# Patient Record
Sex: FEMALE | Race: WHITE | Employment: OTHER | URBAN - METROPOLITAN AREA
[De-identification: names, ages, dates, MRNs, and addresses within clinical notes are randomized per-mention and may not be internally consistent; named-entity substitution may affect disease eponyms.]

---
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
## 2017-10-19 ENCOUNTER — TELEPHONE (OUTPATIENT)
Dept: CARDIOLOGY CLINIC | Age: 77
End: 2017-10-19

## 2018-12-03 ENCOUNTER — APPOINTMENT (OUTPATIENT)
Dept: GENERAL RADIOLOGY | Age: 78
End: 2018-12-03
Payer: MEDICARE

## 2018-12-03 ENCOUNTER — HOSPITAL ENCOUNTER (EMERGENCY)
Age: 78
Discharge: HOME | End: 2018-12-03
Attending: EMERGENCY MEDICINE
Payer: MEDICARE

## 2018-12-03 VITALS
RESPIRATION RATE: 16 BRPM | WEIGHT: 235.89 LBS | HEART RATE: 72 BPM | SYSTOLIC BLOOD PRESSURE: 142 MMHG | OXYGEN SATURATION: 96 % | DIASTOLIC BLOOD PRESSURE: 43 MMHG | BODY MASS INDEX: 43.41 KG/M2 | HEIGHT: 62 IN | TEMPERATURE: 98.1 F

## 2018-12-03 DIAGNOSIS — R06.09 DOE (DYSPNEA ON EXERTION): ICD-10-CM

## 2018-12-03 DIAGNOSIS — N18.6 ESRD ON HEMODIALYSIS (HCC): ICD-10-CM

## 2018-12-03 DIAGNOSIS — Z99.2 ESRD ON HEMODIALYSIS (HCC): ICD-10-CM

## 2018-12-03 DIAGNOSIS — B02.9 HERPES ZOSTER WITHOUT COMPLICATION: Primary | ICD-10-CM

## 2018-12-03 DIAGNOSIS — R73.9 HYPERGLYCEMIA: ICD-10-CM

## 2018-12-03 LAB
A/G RATIO: 1.4 (ref 1.1–2.2)
ALBUMIN SERPL-MCNC: 4.2 G/DL (ref 3.4–5)
ALP BLD-CCNC: 204 U/L (ref 40–129)
ALT SERPL-CCNC: 12 U/L (ref 10–40)
ANION GAP SERPL CALCULATED.3IONS-SCNC: 13 MMOL/L (ref 3–16)
AST SERPL-CCNC: 12 U/L (ref 15–37)
BASOPHILS ABSOLUTE: 0 K/UL (ref 0–0.2)
BASOPHILS RELATIVE PERCENT: 0.5 %
BILIRUB SERPL-MCNC: 0.5 MG/DL (ref 0–1)
BUN BLDV-MCNC: 11 MG/DL (ref 7–20)
CALCIUM SERPL-MCNC: 9.5 MG/DL (ref 8.3–10.6)
CHLORIDE BLD-SCNC: 93 MMOL/L (ref 99–110)
CO2: 30 MMOL/L (ref 21–32)
CREAT SERPL-MCNC: 3 MG/DL (ref 0.6–1.2)
EOSINOPHILS ABSOLUTE: 0.3 K/UL (ref 0–0.6)
EOSINOPHILS RELATIVE PERCENT: 4.7 %
GFR AFRICAN AMERICAN: 18
GFR NON-AFRICAN AMERICAN: 15
GLOBULIN: 3 G/DL
GLUCOSE BLD-MCNC: 298 MG/DL (ref 70–99)
HCT VFR BLD CALC: 32.6 % (ref 36–48)
HEMOGLOBIN: 10.8 G/DL (ref 12–16)
LYMPHOCYTES ABSOLUTE: 0.8 K/UL (ref 1–5.1)
LYMPHOCYTES RELATIVE PERCENT: 12.4 %
MAGNESIUM: 1.8 MG/DL (ref 1.8–2.4)
MCH RBC QN AUTO: 31.6 PG (ref 26–34)
MCHC RBC AUTO-ENTMCNC: 33.2 G/DL (ref 31–36)
MCV RBC AUTO: 95.1 FL (ref 80–100)
MONOCYTES ABSOLUTE: 0.8 K/UL (ref 0–1.3)
MONOCYTES RELATIVE PERCENT: 11.7 %
NEUTROPHILS ABSOLUTE: 4.6 K/UL (ref 1.7–7.7)
NEUTROPHILS RELATIVE PERCENT: 70.7 %
PDW BLD-RTO: 13.7 % (ref 12.4–15.4)
PLATELET # BLD: 195 K/UL (ref 135–450)
PMV BLD AUTO: 8 FL (ref 5–10.5)
POTASSIUM SERPL-SCNC: 4.5 MMOL/L (ref 3.5–5.1)
PRO-BNP: ABNORMAL PG/ML (ref 0–449)
RBC # BLD: 3.43 M/UL (ref 4–5.2)
SODIUM BLD-SCNC: 136 MMOL/L (ref 136–145)
TOTAL PROTEIN: 7.2 G/DL (ref 6.4–8.2)
TROPONIN: <0.01 NG/ML
WBC # BLD: 6.5 K/UL (ref 4–11)

## 2018-12-03 PROCEDURE — 83880 ASSAY OF NATRIURETIC PEPTIDE: CPT

## 2018-12-03 PROCEDURE — 99283 EMERGENCY DEPT VISIT LOW MDM: CPT

## 2018-12-03 PROCEDURE — 71046 X-RAY EXAM CHEST 2 VIEWS: CPT

## 2018-12-03 PROCEDURE — 85025 COMPLETE CBC W/AUTO DIFF WBC: CPT

## 2018-12-03 PROCEDURE — 84484 ASSAY OF TROPONIN QUANT: CPT

## 2018-12-03 PROCEDURE — 80053 COMPREHEN METABOLIC PANEL: CPT

## 2018-12-03 PROCEDURE — 83735 ASSAY OF MAGNESIUM: CPT

## 2018-12-03 RX ORDER — HYDROCODONE BITARTRATE AND ACETAMINOPHEN 5; 325 MG/1; MG/1
1 TABLET ORAL EVERY 6 HOURS PRN
Qty: 10 TABLET | Refills: 0 | Status: ON HOLD | OUTPATIENT
Start: 2018-12-03 | End: 2018-12-11

## 2018-12-03 RX ORDER — CINACALCET 30 MG/1
30 TABLET, FILM COATED ORAL EVERY OTHER DAY
Status: ON HOLD | COMMUNITY
End: 2021-01-01 | Stop reason: HOSPADM

## 2018-12-03 RX ORDER — VALACYCLOVIR HYDROCHLORIDE 1 G/1
1000 TABLET, FILM COATED ORAL 3 TIMES DAILY
Qty: 21 TABLET | Refills: 0 | Status: SHIPPED | OUTPATIENT
Start: 2018-12-03 | End: 2018-12-03

## 2018-12-03 RX ORDER — VALACYCLOVIR HYDROCHLORIDE 500 MG/1
500 TABLET, FILM COATED ORAL DAILY
Qty: 7 TABLET | Refills: 0 | Status: ON HOLD | OUTPATIENT
Start: 2018-12-03 | End: 2018-12-11

## 2018-12-03 RX ORDER — PREDNISONE 10 MG/1
40 TABLET ORAL DAILY
Qty: 20 TABLET | Refills: 0 | Status: SHIPPED | OUTPATIENT
Start: 2018-12-03 | End: 2018-12-08

## 2018-12-03 ASSESSMENT — ENCOUNTER SYMPTOMS
EYE ITCHING: 0
EYE DISCHARGE: 0
COUGH: 0
PHOTOPHOBIA: 0
BACK PAIN: 0
NAUSEA: 0
SHORTNESS OF BREATH: 1
CONSTIPATION: 0
ABDOMINAL PAIN: 0
EYE PAIN: 0
WHEEZING: 0
STRIDOR: 0
DIARRHEA: 0
VOMITING: 0
COLOR CHANGE: 0
ABDOMINAL DISTENTION: 0
EYE REDNESS: 0

## 2018-12-03 ASSESSMENT — PAIN SCALES - GENERAL: PAINLEVEL_OUTOF10: 6

## 2018-12-03 ASSESSMENT — PAIN DESCRIPTION - LOCATION: LOCATION: HEAD

## 2018-12-03 NOTE — ED NOTES
Bed: 23  Expected date:   Expected time:   Means of arrival:   Comments:  Germain Magana RN  12/03/18 2146

## 2018-12-03 NOTE — ED PROVIDER NOTES
[address] [facility]  eMERGENCY dEPARTMENT eNCOUnter        Pt Name: Ira Hamman  MRN: [id number]  [place] 1940  Date of evaluation: 12/3/2018  Provider: Maryann Gilmore PA-C  PCP: Delroy Hagan MD    This patient was seen and evaluated by the attending physician Eriberto Lam, [facility]       Chief Complaint   Patient presents with    Rash     pain on head for last two days, now with blisters. HISTORY OF PRESENT ILLNESS   (Location/Symptom, Timing/Onset, Context/Setting, Quality, Duration, Modifying Factors, Severity)  Note limiting factors. Ira Hamman is a [de-identified] y.o. female with history of congestive heart failure, coronary artery disease, CABG, end-stage renal disease on Monday Wednesday Friday who presents to the emergency department complaining of 2 separate issues. First, she primarily comes here for a new rash on the scalp that started this morning. She has had pain over this area and some itching for the past 2 days. The rash just appeared today. She thinks that she has shingles. Although the left upper eyelid feels \"funny\", she denies any rash on the eyelid or pain in the eye. There is no facial palsy with this. Secondly, patient presents complaining of shortness of breath that has presently worsening the past few months. She has already addressed this with her nephrologist and cardiologist who believed that she requires additional time on dialysis. While she is here dressing the rash, she wanted to have some basic blood work and chest x-ray to evaluate the shortness of breath and little bit further. Nursing Notes were all reviewed and agreed with or any disagreements were addressed  in the HPI. REVIEW OF SYSTEMS    (2-9 systems for level 4, 10 or more for level 5)     Review of Systems   Constitutional: Negative for chills and fever. HENT: Negative.     Eyes: Negative for photophobia, pain, discharge, redness, itching and visual disturbance. Respiratory: Positive for shortness of breath. Negative for cough, wheezing and stridor. Cardiovascular: Negative for chest pain, palpitations and leg swelling. Gastrointestinal: Negative for abdominal distention, abdominal pain, constipation, diarrhea, nausea and vomiting. Endocrine: Negative. Genitourinary: Negative. Musculoskeletal: Negative for back pain, neck pain and neck stiffness. Skin: Positive for rash. Negative for color change, pallor and wound. Neurological: Positive for headaches. Negative for dizziness, tremors, seizures, syncope, speech difficulty, weakness, light-headedness and numbness. Psychiatric/Behavioral: Negative for confusion. All other systems reviewed and are negative. Positives and Pertinent negatives as per HPI. Except as noted abovein the ROS, all other systems were reviewed and negative. PAST MEDICAL HISTORY     Past Medical History:   Diagnosis Date    Arthritis     Blood circulation, collateral     CAD (coronary artery disease)     CHF (congestive heart failure) (HCC)     Chronic kidney disease     [facility] mellitus [facility] [address] [address]     Hemodialysis patient ([facility] [address]     Mon. Wed. Fri.     Hypercholesteremia     Hypertension     Other disorders of kidney and ureter     Pneumonia     pneumonia    Thyroid disease          SURGICAL HISTORY     Past Surgical History:   Procedure Laterality Date    ABDOMEN SURGERY      CARDIAC SURGERY      CORONARY ARTERY BYPASS GRAFT      4 grafts, approx 5 yrs ago    CYST REMOVAL      DIALYSIS FISTULA CREATION  12/18/14    EYE SURGERY      cataracts removed/leaks    OTHER SURGICAL HISTORY Left 12/18/14    LEFT RADIOCEPHALIC FISTULA    REFRACTIVE SURGERY      TONSILLECTOMY      VASCULAR SURGERY           CURRENTMEDICATIONS       Discharge Medication List as of 12/3/2018  2:47 PM      CONTINUE these medications which have NOT CHANGED    Details   cinacalcet

## 2018-12-10 ENCOUNTER — HOSPITAL ENCOUNTER (INPATIENT)
Age: 78
LOS: 7 days | Discharge: HOME HEALTH | DRG: 640 | End: 2018-12-17
Attending: EMERGENCY MEDICINE | Admitting: PEDIATRICS
Payer: MEDICARE

## 2018-12-10 ENCOUNTER — APPOINTMENT (OUTPATIENT)
Dept: GENERAL RADIOLOGY | Age: 78
DRG: 640 | End: 2018-12-10
Payer: MEDICARE

## 2018-12-10 DIAGNOSIS — E87.5 HYPERKALEMIA: ICD-10-CM

## 2018-12-10 DIAGNOSIS — R09.89 PULMONARY VASCULAR CONGESTION: ICD-10-CM

## 2018-12-10 DIAGNOSIS — Z99.2 ESRD ON DIALYSIS (HCC): Primary | ICD-10-CM

## 2018-12-10 DIAGNOSIS — N18.6 ESRD ON DIALYSIS (HCC): Primary | ICD-10-CM

## 2018-12-10 PROBLEM — E87.70 VOLUME OVERLOAD: Status: ACTIVE | Noted: 2018-12-10

## 2018-12-10 LAB
A/G RATIO: 1.3 (ref 1.1–2.2)
ALBUMIN SERPL-MCNC: 3.8 G/DL (ref 3.4–5)
ALP BLD-CCNC: 170 U/L (ref 40–129)
ALT SERPL-CCNC: 23 U/L (ref 10–40)
ANION GAP SERPL CALCULATED.3IONS-SCNC: 15 MMOL/L (ref 3–16)
AST SERPL-CCNC: 15 U/L (ref 15–37)
BASOPHILS ABSOLUTE: 0 K/UL (ref 0–0.2)
BASOPHILS RELATIVE PERCENT: 0.2 %
BILIRUB SERPL-MCNC: 0.6 MG/DL (ref 0–1)
BUN BLDV-MCNC: 67 MG/DL (ref 7–20)
CALCIUM SERPL-MCNC: 9.1 MG/DL (ref 8.3–10.6)
CHLORIDE BLD-SCNC: 97 MMOL/L (ref 99–110)
CO2: 23 MMOL/L (ref 21–32)
CREAT SERPL-MCNC: 7.1 MG/DL (ref 0.6–1.2)
EOSINOPHILS ABSOLUTE: 0 K/UL (ref 0–0.6)
EOSINOPHILS RELATIVE PERCENT: 0.2 %
GFR AFRICAN AMERICAN: 7
GFR NON-AFRICAN AMERICAN: 6
GLOBULIN: 3 G/DL
GLUCOSE BLD-MCNC: 214 MG/DL (ref 70–99)
GLUCOSE BLD-MCNC: 240 MG/DL (ref 70–99)
HCT VFR BLD CALC: 30.7 % (ref 36–48)
HEMOGLOBIN: 10.4 G/DL (ref 12–16)
INR BLD: 1.08 (ref 0.86–1.14)
LACTIC ACID: 1.5 MMOL/L (ref 0.4–2)
LYMPHOCYTES ABSOLUTE: 0.7 K/UL (ref 1–5.1)
LYMPHOCYTES RELATIVE PERCENT: 5.8 %
MCH RBC QN AUTO: 31.9 PG (ref 26–34)
MCHC RBC AUTO-ENTMCNC: 33.8 G/DL (ref 31–36)
MCV RBC AUTO: 94.6 FL (ref 80–100)
MONOCYTES ABSOLUTE: 0.9 K/UL (ref 0–1.3)
MONOCYTES RELATIVE PERCENT: 7.9 %
NEUTROPHILS ABSOLUTE: 9.8 K/UL (ref 1.7–7.7)
NEUTROPHILS RELATIVE PERCENT: 85.9 %
PDW BLD-RTO: 14.2 % (ref 12.4–15.4)
PERFORMED ON: ABNORMAL
PLATELET # BLD: 236 K/UL (ref 135–450)
PMV BLD AUTO: 7.7 FL (ref 5–10.5)
POTASSIUM REFLEX MAGNESIUM: 5.9 MMOL/L (ref 3.5–5.1)
PRO-BNP: ABNORMAL PG/ML (ref 0–449)
PROTHROMBIN TIME: 12.3 SEC (ref 9.8–13)
RBC # BLD: 3.24 M/UL (ref 4–5.2)
SODIUM BLD-SCNC: 135 MMOL/L (ref 136–145)
TOTAL PROTEIN: 6.8 G/DL (ref 6.4–8.2)
TROPONIN: 0.02 NG/ML
WBC # BLD: 11.4 K/UL (ref 4–11)

## 2018-12-10 PROCEDURE — 96374 THER/PROPH/DIAG INJ IV PUSH: CPT

## 2018-12-10 PROCEDURE — 93005 ELECTROCARDIOGRAM TRACING: CPT | Performed by: PHYSICIAN ASSISTANT

## 2018-12-10 PROCEDURE — 94640 AIRWAY INHALATION TREATMENT: CPT

## 2018-12-10 PROCEDURE — 94664 DEMO&/EVAL PT USE INHALER: CPT

## 2018-12-10 PROCEDURE — 87040 BLOOD CULTURE FOR BACTERIA: CPT

## 2018-12-10 PROCEDURE — 71045 X-RAY EXAM CHEST 1 VIEW: CPT

## 2018-12-10 PROCEDURE — 6360000002 HC RX W HCPCS: Performed by: PHYSICIAN ASSISTANT

## 2018-12-10 PROCEDURE — 84484 ASSAY OF TROPONIN QUANT: CPT

## 2018-12-10 PROCEDURE — 6370000000 HC RX 637 (ALT 250 FOR IP): Performed by: PHYSICIAN ASSISTANT

## 2018-12-10 PROCEDURE — 83880 ASSAY OF NATRIURETIC PEPTIDE: CPT

## 2018-12-10 PROCEDURE — 99285 EMERGENCY DEPT VISIT HI MDM: CPT

## 2018-12-10 PROCEDURE — 80053 COMPREHEN METABOLIC PANEL: CPT

## 2018-12-10 PROCEDURE — 94760 N-INVAS EAR/PLS OXIMETRY 1: CPT

## 2018-12-10 PROCEDURE — 2700000000 HC OXYGEN THERAPY PER DAY

## 2018-12-10 PROCEDURE — 85610 PROTHROMBIN TIME: CPT

## 2018-12-10 PROCEDURE — 85025 COMPLETE CBC W/AUTO DIFF WBC: CPT

## 2018-12-10 PROCEDURE — 2000000000 HC ICU R&B

## 2018-12-10 PROCEDURE — 5A1D70Z PERFORMANCE OF URINARY FILTRATION, INTERMITTENT, LESS THAN 6 HOURS PER DAY: ICD-10-PCS | Performed by: INTERNAL MEDICINE

## 2018-12-10 PROCEDURE — 83605 ASSAY OF LACTIC ACID: CPT

## 2018-12-10 PROCEDURE — 2580000003 HC RX 258: Performed by: PHYSICIAN ASSISTANT

## 2018-12-10 RX ORDER — ONDANSETRON 2 MG/ML
4 INJECTION INTRAMUSCULAR; INTRAVENOUS EVERY 6 HOURS PRN
Status: DISCONTINUED | OUTPATIENT
Start: 2018-12-10 | End: 2018-12-14

## 2018-12-10 RX ORDER — IPRATROPIUM BROMIDE AND ALBUTEROL SULFATE 2.5; .5 MG/3ML; MG/3ML
1 SOLUTION RESPIRATORY (INHALATION) ONCE
Status: DISCONTINUED | OUTPATIENT
Start: 2018-12-10 | End: 2018-12-10

## 2018-12-10 RX ORDER — IPRATROPIUM BROMIDE AND ALBUTEROL SULFATE 2.5; .5 MG/3ML; MG/3ML
1 SOLUTION RESPIRATORY (INHALATION) ONCE
Status: COMPLETED | OUTPATIENT
Start: 2018-12-10 | End: 2018-12-10

## 2018-12-10 RX ORDER — SODIUM CHLORIDE 0.9 % (FLUSH) 0.9 %
10 SYRINGE (ML) INJECTION EVERY 12 HOURS SCHEDULED
Status: DISCONTINUED | OUTPATIENT
Start: 2018-12-11 | End: 2018-12-17 | Stop reason: HOSPADM

## 2018-12-10 RX ORDER — DOCUSATE SODIUM 100 MG/1
100 CAPSULE, LIQUID FILLED ORAL DAILY
Status: DISCONTINUED | OUTPATIENT
Start: 2018-12-11 | End: 2018-12-17 | Stop reason: HOSPADM

## 2018-12-10 RX ORDER — DEXTROSE MONOHYDRATE 50 MG/ML
100 INJECTION, SOLUTION INTRAVENOUS PRN
Status: DISCONTINUED | OUTPATIENT
Start: 2018-12-10 | End: 2018-12-17 | Stop reason: HOSPADM

## 2018-12-10 RX ORDER — CINACALCET 30 MG/1
30 TABLET, FILM COATED ORAL DAILY
Status: DISCONTINUED | OUTPATIENT
Start: 2018-12-11 | End: 2018-12-17 | Stop reason: HOSPADM

## 2018-12-10 RX ORDER — LIDOCAINE AND PRILOCAINE 25; 25 MG/G; MG/G
CREAM TOPICAL PRN
Status: DISCONTINUED | OUTPATIENT
Start: 2018-12-10 | End: 2018-12-17 | Stop reason: HOSPADM

## 2018-12-10 RX ORDER — OMEGA-3/DHA/EPA/FISH OIL 300-1000MG
1 CAPSULE ORAL 3 TIMES DAILY
Status: DISCONTINUED | OUTPATIENT
Start: 2018-12-11 | End: 2018-12-10 | Stop reason: CLARIF

## 2018-12-10 RX ORDER — PANTOPRAZOLE SODIUM 40 MG/1
40 TABLET, DELAYED RELEASE ORAL
Status: DISCONTINUED | OUTPATIENT
Start: 2018-12-11 | End: 2018-12-17 | Stop reason: HOSPADM

## 2018-12-10 RX ORDER — SODIUM CHLORIDE 9 MG/ML
INJECTION, SOLUTION INTRAVENOUS
Status: DISPENSED
Start: 2018-12-10 | End: 2018-12-11

## 2018-12-10 RX ORDER — NICOTINE POLACRILEX 4 MG
15 LOZENGE BUCCAL PRN
Status: DISCONTINUED | OUTPATIENT
Start: 2018-12-10 | End: 2018-12-17 | Stop reason: HOSPADM

## 2018-12-10 RX ORDER — ROSUVASTATIN CALCIUM 10 MG/1
20 TABLET, COATED ORAL DAILY
Status: DISCONTINUED | OUTPATIENT
Start: 2018-12-11 | End: 2018-12-17 | Stop reason: HOSPADM

## 2018-12-10 RX ORDER — DEXTROSE MONOHYDRATE 25 G/50ML
25 INJECTION, SOLUTION INTRAVENOUS PRN
Status: DISCONTINUED | OUTPATIENT
Start: 2018-12-10 | End: 2018-12-17 | Stop reason: HOSPADM

## 2018-12-10 RX ORDER — DEXTROSE MONOHYDRATE 25 G/50ML
12.5 INJECTION, SOLUTION INTRAVENOUS PRN
Status: DISCONTINUED | OUTPATIENT
Start: 2018-12-10 | End: 2018-12-17 | Stop reason: HOSPADM

## 2018-12-10 RX ORDER — METHYLPREDNISOLONE SODIUM SUCCINATE 125 MG/2ML
125 INJECTION, POWDER, LYOPHILIZED, FOR SOLUTION INTRAMUSCULAR; INTRAVENOUS ONCE
Status: COMPLETED | OUTPATIENT
Start: 2018-12-10 | End: 2018-12-10

## 2018-12-10 RX ORDER — ALBUTEROL SULFATE 2.5 MG/3ML
5 SOLUTION RESPIRATORY (INHALATION) ONCE
Status: COMPLETED | OUTPATIENT
Start: 2018-12-10 | End: 2018-12-10

## 2018-12-10 RX ORDER — SODIUM CHLORIDE 0.9 % (FLUSH) 0.9 %
10 SYRINGE (ML) INJECTION PRN
Status: DISCONTINUED | OUTPATIENT
Start: 2018-12-10 | End: 2018-12-17 | Stop reason: HOSPADM

## 2018-12-10 RX ORDER — ASPIRIN 81 MG/1
81 TABLET ORAL DAILY
Status: DISCONTINUED | OUTPATIENT
Start: 2018-12-11 | End: 2018-12-14

## 2018-12-10 RX ORDER — LEVOTHYROXINE SODIUM 0.03 MG/1
25 TABLET ORAL DAILY
Status: DISCONTINUED | OUTPATIENT
Start: 2018-12-11 | End: 2018-12-17 | Stop reason: HOSPADM

## 2018-12-10 RX ORDER — HEPARIN SODIUM 1000 [USP'U]/ML
2000 INJECTION, SOLUTION INTRAVENOUS; SUBCUTANEOUS ONCE
Status: DISCONTINUED | OUTPATIENT
Start: 2018-12-11 | End: 2018-12-14

## 2018-12-10 RX ORDER — SEVELAMER CARBONATE 800 MG/1
800 TABLET, FILM COATED ORAL 2 TIMES DAILY WITH MEALS
Status: DISCONTINUED | OUTPATIENT
Start: 2018-12-11 | End: 2018-12-17 | Stop reason: HOSPADM

## 2018-12-10 RX ORDER — AMLODIPINE BESYLATE 5 MG/1
10 TABLET ORAL DAILY
Status: DISCONTINUED | OUTPATIENT
Start: 2018-12-11 | End: 2018-12-11

## 2018-12-10 RX ORDER — HYDROCODONE BITARTRATE AND ACETAMINOPHEN 5; 325 MG/1; MG/1
1 TABLET ORAL EVERY 6 HOURS PRN
Status: DISCONTINUED | OUTPATIENT
Start: 2018-12-10 | End: 2018-12-17 | Stop reason: HOSPADM

## 2018-12-10 RX ORDER — CARVEDILOL 6.25 MG/1
12.5 TABLET ORAL 2 TIMES DAILY WITH MEALS
Status: DISCONTINUED | OUTPATIENT
Start: 2018-12-11 | End: 2018-12-17 | Stop reason: HOSPADM

## 2018-12-10 RX ADMIN — DEXTROSE MONOHYDRATE 25 G: 25 INJECTION, SOLUTION INTRAVENOUS at 22:16

## 2018-12-10 RX ADMIN — INSULIN HUMAN 10 UNITS: 100 INJECTION, SOLUTION PARENTERAL at 22:16

## 2018-12-10 RX ADMIN — IPRATROPIUM BROMIDE AND ALBUTEROL SULFATE 1 AMPULE: .5; 3 SOLUTION RESPIRATORY (INHALATION) at 20:03

## 2018-12-10 RX ADMIN — METHYLPREDNISOLONE SODIUM SUCCINATE 125 MG: 125 INJECTION, POWDER, FOR SOLUTION INTRAMUSCULAR; INTRAVENOUS at 20:56

## 2018-12-10 RX ADMIN — ALBUTEROL SULFATE 5 MG: 2.5 SOLUTION RESPIRATORY (INHALATION) at 20:04

## 2018-12-10 ASSESSMENT — ENCOUNTER SYMPTOMS
NAUSEA: 1
CONSTIPATION: 0
VOMITING: 0
CHEST TIGHTNESS: 0
COLOR CHANGE: 0
COUGH: 1
DIARRHEA: 0
SHORTNESS OF BREATH: 1
ABDOMINAL PAIN: 0

## 2018-12-10 ASSESSMENT — PAIN SCALES - GENERAL: PAINLEVEL_OUTOF10: 0

## 2018-12-11 PROBLEM — J96.91 RESPIRATORY FAILURE WITH HYPOXIA (HCC): Status: ACTIVE | Noted: 2018-12-11

## 2018-12-11 LAB
ANION GAP SERPL CALCULATED.3IONS-SCNC: 19 MMOL/L (ref 3–16)
BUN BLDV-MCNC: 41 MG/DL (ref 7–20)
CALCIUM SERPL-MCNC: 9.4 MG/DL (ref 8.3–10.6)
CHLORIDE BLD-SCNC: 93 MMOL/L (ref 99–110)
CO2: 22 MMOL/L (ref 21–32)
CREAT SERPL-MCNC: 5 MG/DL (ref 0.6–1.2)
EKG ATRIAL RATE: 82 BPM
EKG DIAGNOSIS: NORMAL
EKG P AXIS: 40 DEGREES
EKG P-R INTERVAL: 148 MS
EKG Q-T INTERVAL: 356 MS
EKG QRS DURATION: 64 MS
EKG QTC CALCULATION (BAZETT): 415 MS
EKG R AXIS: 83 DEGREES
EKG T AXIS: 139 DEGREES
EKG VENTRICULAR RATE: 82 BPM
GFR AFRICAN AMERICAN: 10
GFR NON-AFRICAN AMERICAN: 8
GLUCOSE BLD-MCNC: 105 MG/DL (ref 70–99)
GLUCOSE BLD-MCNC: 166 MG/DL (ref 70–99)
GLUCOSE BLD-MCNC: 221 MG/DL (ref 70–99)
GLUCOSE BLD-MCNC: 234 MG/DL (ref 70–99)
GLUCOSE BLD-MCNC: 261 MG/DL (ref 70–99)
HCT VFR BLD CALC: 31.4 % (ref 36–48)
HEMOGLOBIN: 10.5 G/DL (ref 12–16)
HEPATITIS B SURFACE ANTIGEN INTERPRETATION: NORMAL
MAGNESIUM: 1.9 MG/DL (ref 1.8–2.4)
MCH RBC QN AUTO: 31.7 PG (ref 26–34)
MCHC RBC AUTO-ENTMCNC: 33.4 G/DL (ref 31–36)
MCV RBC AUTO: 94.9 FL (ref 80–100)
PDW BLD-RTO: 14.2 % (ref 12.4–15.4)
PERFORMED ON: ABNORMAL
PHOSPHORUS: 4.8 MG/DL (ref 2.5–4.9)
PLATELET # BLD: 204 K/UL (ref 135–450)
PMV BLD AUTO: 7.8 FL (ref 5–10.5)
POTASSIUM SERPL-SCNC: 5.1 MMOL/L (ref 3.5–5.1)
RBC # BLD: 3.3 M/UL (ref 4–5.2)
SODIUM BLD-SCNC: 134 MMOL/L (ref 136–145)
WBC # BLD: 9.4 K/UL (ref 4–11)

## 2018-12-11 PROCEDURE — 6360000002 HC RX W HCPCS: Performed by: INTERNAL MEDICINE

## 2018-12-11 PROCEDURE — 90937 HEMODIALYSIS REPEATED EVAL: CPT

## 2018-12-11 PROCEDURE — 85027 COMPLETE CBC AUTOMATED: CPT

## 2018-12-11 PROCEDURE — 83735 ASSAY OF MAGNESIUM: CPT

## 2018-12-11 PROCEDURE — 1200000000 HC SEMI PRIVATE

## 2018-12-11 PROCEDURE — 99223 1ST HOSP IP/OBS HIGH 75: CPT | Performed by: INTERNAL MEDICINE

## 2018-12-11 PROCEDURE — 6370000000 HC RX 637 (ALT 250 FOR IP): Performed by: HOSPITALIST

## 2018-12-11 PROCEDURE — 80048 BASIC METABOLIC PNL TOTAL CA: CPT

## 2018-12-11 PROCEDURE — 84100 ASSAY OF PHOSPHORUS: CPT

## 2018-12-11 PROCEDURE — 2580000003 HC RX 258

## 2018-12-11 PROCEDURE — 2580000003 HC RX 258: Performed by: PEDIATRICS

## 2018-12-11 PROCEDURE — 87340 HEPATITIS B SURFACE AG IA: CPT

## 2018-12-11 PROCEDURE — 6370000000 HC RX 637 (ALT 250 FOR IP): Performed by: PEDIATRICS

## 2018-12-11 PROCEDURE — P9046 ALBUMIN (HUMAN), 25%, 20 ML: HCPCS | Performed by: INTERNAL MEDICINE

## 2018-12-11 PROCEDURE — 93010 ELECTROCARDIOGRAM REPORT: CPT | Performed by: INTERNAL MEDICINE

## 2018-12-11 PROCEDURE — 94640 AIRWAY INHALATION TREATMENT: CPT

## 2018-12-11 PROCEDURE — 94760 N-INVAS EAR/PLS OXIMETRY 1: CPT

## 2018-12-11 PROCEDURE — 36415 COLL VENOUS BLD VENIPUNCTURE: CPT

## 2018-12-11 RX ORDER — IPRATROPIUM BROMIDE AND ALBUTEROL SULFATE 2.5; .5 MG/3ML; MG/3ML
1 SOLUTION RESPIRATORY (INHALATION) 2 TIMES DAILY
Status: DISCONTINUED | OUTPATIENT
Start: 2018-12-12 | End: 2018-12-12

## 2018-12-11 RX ORDER — SODIUM CHLORIDE 9 MG/ML
INJECTION, SOLUTION INTRAVENOUS
Status: COMPLETED
Start: 2018-12-11 | End: 2018-12-11

## 2018-12-11 RX ORDER — IPRATROPIUM BROMIDE AND ALBUTEROL SULFATE 2.5; .5 MG/3ML; MG/3ML
1 SOLUTION RESPIRATORY (INHALATION)
Status: DISCONTINUED | OUTPATIENT
Start: 2018-12-11 | End: 2018-12-11

## 2018-12-11 RX ORDER — ALBUMIN (HUMAN) 12.5 G/50ML
50 SOLUTION INTRAVENOUS ONCE
Status: COMPLETED | OUTPATIENT
Start: 2018-12-11 | End: 2018-12-11

## 2018-12-11 RX ORDER — INSULIN GLARGINE 100 [IU]/ML
20 INJECTION, SOLUTION SUBCUTANEOUS NIGHTLY
Status: ON HOLD | COMMUNITY
End: 2018-12-11 | Stop reason: DRUGHIGH

## 2018-12-11 RX ORDER — HEPARIN SODIUM 1000 [USP'U]/ML
3200 INJECTION, SOLUTION INTRAVENOUS; SUBCUTANEOUS PRN
Status: DISCONTINUED | OUTPATIENT
Start: 2018-12-11 | End: 2018-12-14

## 2018-12-11 RX ORDER — IPRATROPIUM BROMIDE AND ALBUTEROL SULFATE 2.5; .5 MG/3ML; MG/3ML
1 SOLUTION RESPIRATORY (INHALATION) EVERY 4 HOURS PRN
Status: DISCONTINUED | OUTPATIENT
Start: 2018-12-11 | End: 2018-12-17 | Stop reason: HOSPADM

## 2018-12-11 RX ORDER — GUAIFENESIN/DEXTROMETHORPHAN 100-10MG/5
5 SYRUP ORAL EVERY 4 HOURS PRN
Status: DISCONTINUED | OUTPATIENT
Start: 2018-12-11 | End: 2018-12-17 | Stop reason: HOSPADM

## 2018-12-11 RX ADMIN — SODIUM CHLORIDE 1000 ML: 9 INJECTION, SOLUTION INTRAVENOUS at 09:19

## 2018-12-11 RX ADMIN — VITAMIN D, TAB 1000IU (100/BT) 1000 UNITS: 25 TAB at 09:16

## 2018-12-11 RX ADMIN — IPRATROPIUM BROMIDE AND ALBUTEROL SULFATE 1 AMPULE: .5; 3 SOLUTION RESPIRATORY (INHALATION) at 20:32

## 2018-12-11 RX ADMIN — GUAIFENESIN AND DEXTROMETHORPHAN 5 ML: 100; 10 SYRUP ORAL at 18:10

## 2018-12-11 RX ADMIN — CARVEDILOL 12.5 MG: 6.25 TABLET, FILM COATED ORAL at 18:10

## 2018-12-11 RX ADMIN — ALBUMIN (HUMAN) 50 G: 0.25 INJECTION, SOLUTION INTRAVENOUS at 10:11

## 2018-12-11 RX ADMIN — SEVELAMER CARBONATE 800 MG: 800 TABLET, FILM COATED ORAL at 18:11

## 2018-12-11 RX ADMIN — GUAIFENESIN AND DEXTROMETHORPHAN 5 ML: 100; 10 SYRUP ORAL at 22:17

## 2018-12-11 RX ADMIN — INSULIN LISPRO 20 UNITS: 100 INJECTION, SOLUTION INTRAVENOUS; SUBCUTANEOUS at 18:30

## 2018-12-11 RX ADMIN — DOCUSATE SODIUM 100 MG: 100 CAPSULE, LIQUID FILLED ORAL at 09:16

## 2018-12-11 RX ADMIN — Medication 10 ML: at 09:17

## 2018-12-11 RX ADMIN — INSULIN LISPRO 22 UNITS: 100 INJECTION, SOLUTION INTRAVENOUS; SUBCUTANEOUS at 09:21

## 2018-12-11 RX ADMIN — PANTOPRAZOLE SODIUM 40 MG: 40 TABLET, DELAYED RELEASE ORAL at 06:36

## 2018-12-11 RX ADMIN — LEVOTHYROXINE SODIUM 25 MCG: 25 TABLET ORAL at 06:36

## 2018-12-11 RX ADMIN — SEVELAMER CARBONATE 800 MG: 800 TABLET, FILM COATED ORAL at 09:16

## 2018-12-11 RX ADMIN — CINACALCET HYDROCHLORIDE 30 MG: 30 TABLET, COATED ORAL at 09:16

## 2018-12-11 RX ADMIN — HEPARIN SODIUM 3200 UNITS: 1000 INJECTION INTRAVENOUS; SUBCUTANEOUS at 09:51

## 2018-12-11 RX ADMIN — Medication 10 ML: at 21:05

## 2018-12-11 RX ADMIN — ROSUVASTATIN CALCIUM 20 MG: 10 TABLET, FILM COATED ORAL at 09:16

## 2018-12-11 ASSESSMENT — PAIN SCALES - GENERAL
PAINLEVEL_OUTOF10: 0

## 2018-12-12 ENCOUNTER — APPOINTMENT (OUTPATIENT)
Dept: GENERAL RADIOLOGY | Age: 78
DRG: 640 | End: 2018-12-12
Payer: MEDICARE

## 2018-12-12 LAB
ALBUMIN SERPL-MCNC: 4.2 G/DL (ref 3.4–5)
ANION GAP SERPL CALCULATED.3IONS-SCNC: 15 MMOL/L (ref 3–16)
ATYPICAL LYMPHOCYTE RELATIVE PERCENT: 1 % (ref 0–6)
BANDED NEUTROPHILS RELATIVE PERCENT: 2 % (ref 0–7)
BASOPHILS ABSOLUTE: 0 K/UL (ref 0–0.2)
BASOPHILS RELATIVE PERCENT: 0 %
BUN BLDV-MCNC: 37 MG/DL (ref 7–20)
CALCIUM SERPL-MCNC: 8.9 MG/DL (ref 8.3–10.6)
CHLORIDE BLD-SCNC: 93 MMOL/L (ref 99–110)
CO2: 26 MMOL/L (ref 21–32)
CREAT SERPL-MCNC: 3.9 MG/DL (ref 0.6–1.2)
EOSINOPHILS ABSOLUTE: 0 K/UL (ref 0–0.6)
EOSINOPHILS RELATIVE PERCENT: 0 %
GFR AFRICAN AMERICAN: 13
GFR NON-AFRICAN AMERICAN: 11
GLUCOSE BLD-MCNC: 113 MG/DL (ref 70–99)
GLUCOSE BLD-MCNC: 116 MG/DL (ref 70–99)
GLUCOSE BLD-MCNC: 117 MG/DL (ref 70–99)
GLUCOSE BLD-MCNC: 184 MG/DL (ref 70–99)
GLUCOSE BLD-MCNC: 318 MG/DL (ref 70–99)
HCT VFR BLD CALC: 28.7 % (ref 36–48)
HEMATOLOGY PATH CONSULT: NORMAL
HEMOGLOBIN: 9.7 G/DL (ref 12–16)
LYMPHOCYTES ABSOLUTE: 0.6 K/UL (ref 1–5.1)
LYMPHOCYTES RELATIVE PERCENT: 5 %
MAGNESIUM: 2.1 MG/DL (ref 1.8–2.4)
MCH RBC QN AUTO: 32.3 PG (ref 26–34)
MCHC RBC AUTO-ENTMCNC: 33.7 G/DL (ref 31–36)
MCV RBC AUTO: 96 FL (ref 80–100)
MONOCYTES ABSOLUTE: 1.9 K/UL (ref 0–1.3)
MONOCYTES RELATIVE PERCENT: 18 %
NEUTROPHILS ABSOLUTE: 7.8 K/UL (ref 1.7–7.7)
NEUTROPHILS RELATIVE PERCENT: 74 %
PDW BLD-RTO: 14.4 % (ref 12.4–15.4)
PERFORMED ON: ABNORMAL
PHOSPHORUS: 4.1 MG/DL (ref 2.5–4.9)
PHOSPHORUS: 4.1 MG/DL (ref 2.5–4.9)
PLATELET # BLD: 188 K/UL (ref 135–450)
PLATELET SLIDE REVIEW: ADEQUATE
PMV BLD AUTO: 8.2 FL (ref 5–10.5)
POTASSIUM SERPL-SCNC: 4.4 MMOL/L (ref 3.5–5.1)
RBC # BLD: 2.99 M/UL (ref 4–5.2)
RBC # BLD: NORMAL 10*6/UL
SLIDE REVIEW: ABNORMAL
SODIUM BLD-SCNC: 134 MMOL/L (ref 136–145)
WBC # BLD: 10.3 K/UL (ref 4–11)

## 2018-12-12 PROCEDURE — 94760 N-INVAS EAR/PLS OXIMETRY 1: CPT

## 2018-12-12 PROCEDURE — 2580000003 HC RX 258

## 2018-12-12 PROCEDURE — 94640 AIRWAY INHALATION TREATMENT: CPT

## 2018-12-12 PROCEDURE — 1200000000 HC SEMI PRIVATE

## 2018-12-12 PROCEDURE — 2580000003 HC RX 258: Performed by: PEDIATRICS

## 2018-12-12 PROCEDURE — 36415 COLL VENOUS BLD VENIPUNCTURE: CPT

## 2018-12-12 PROCEDURE — 84100 ASSAY OF PHOSPHORUS: CPT

## 2018-12-12 PROCEDURE — 6370000000 HC RX 637 (ALT 250 FOR IP): Performed by: INTERNAL MEDICINE

## 2018-12-12 PROCEDURE — 2700000000 HC OXYGEN THERAPY PER DAY

## 2018-12-12 PROCEDURE — 6360000002 HC RX W HCPCS: Performed by: INTERNAL MEDICINE

## 2018-12-12 PROCEDURE — P9046 ALBUMIN (HUMAN), 25%, 20 ML: HCPCS | Performed by: INTERNAL MEDICINE

## 2018-12-12 PROCEDURE — 85025 COMPLETE CBC W/AUTO DIFF WBC: CPT

## 2018-12-12 PROCEDURE — 6370000000 HC RX 637 (ALT 250 FOR IP): Performed by: HOSPITALIST

## 2018-12-12 PROCEDURE — 80069 RENAL FUNCTION PANEL: CPT

## 2018-12-12 PROCEDURE — 71045 X-RAY EXAM CHEST 1 VIEW: CPT

## 2018-12-12 PROCEDURE — 83735 ASSAY OF MAGNESIUM: CPT

## 2018-12-12 PROCEDURE — 90937 HEMODIALYSIS REPEATED EVAL: CPT

## 2018-12-12 PROCEDURE — 6370000000 HC RX 637 (ALT 250 FOR IP): Performed by: PEDIATRICS

## 2018-12-12 RX ORDER — IPRATROPIUM BROMIDE AND ALBUTEROL SULFATE 2.5; .5 MG/3ML; MG/3ML
1 SOLUTION RESPIRATORY (INHALATION) EVERY 4 HOURS
Status: DISCONTINUED | OUTPATIENT
Start: 2018-12-13 | End: 2018-12-15

## 2018-12-12 RX ORDER — SODIUM CHLORIDE 9 MG/ML
INJECTION, SOLUTION INTRAVENOUS
Status: COMPLETED
Start: 2018-12-12 | End: 2018-12-12

## 2018-12-12 RX ORDER — IPRATROPIUM BROMIDE AND ALBUTEROL SULFATE 2.5; .5 MG/3ML; MG/3ML
1 SOLUTION RESPIRATORY (INHALATION)
Status: DISCONTINUED | OUTPATIENT
Start: 2018-12-12 | End: 2018-12-12

## 2018-12-12 RX ORDER — GUAIFENESIN/DEXTROMETHORPHAN 100-10MG/5
5 SYRUP ORAL EVERY 4 HOURS PRN
Status: DISCONTINUED | OUTPATIENT
Start: 2018-12-12 | End: 2018-12-17 | Stop reason: HOSPADM

## 2018-12-12 RX ORDER — ALBUMIN (HUMAN) 12.5 G/50ML
50 SOLUTION INTRAVENOUS ONCE
Status: COMPLETED | OUTPATIENT
Start: 2018-12-12 | End: 2018-12-12

## 2018-12-12 RX ADMIN — CINACALCET HYDROCHLORIDE 30 MG: 30 TABLET, COATED ORAL at 08:31

## 2018-12-12 RX ADMIN — IPRATROPIUM BROMIDE AND ALBUTEROL SULFATE 1 AMPULE: .5; 3 SOLUTION RESPIRATORY (INHALATION) at 21:00

## 2018-12-12 RX ADMIN — VITAMIN D, TAB 1000IU (100/BT) 1000 UNITS: 25 TAB at 08:32

## 2018-12-12 RX ADMIN — DARBEPOETIN ALFA 40 MCG: 40 SOLUTION INTRAVENOUS; SUBCUTANEOUS at 15:17

## 2018-12-12 RX ADMIN — GUAIFENESIN AND DEXTROMETHORPHAN 5 ML: 100; 10 SYRUP ORAL at 08:35

## 2018-12-12 RX ADMIN — GUAIFENESIN AND DEXTROMETHORPHAN 5 ML: 100; 10 SYRUP ORAL at 21:37

## 2018-12-12 RX ADMIN — IPRATROPIUM BROMIDE AND ALBUTEROL SULFATE 1 AMPULE: .5; 3 SOLUTION RESPIRATORY (INHALATION) at 18:22

## 2018-12-12 RX ADMIN — ALBUMIN (HUMAN) 50 G: 0.25 INJECTION, SOLUTION INTRAVENOUS at 09:55

## 2018-12-12 RX ADMIN — Medication 10 ML: at 08:32

## 2018-12-12 RX ADMIN — SEVELAMER CARBONATE 800 MG: 800 TABLET, FILM COATED ORAL at 08:32

## 2018-12-12 RX ADMIN — IPRATROPIUM BROMIDE AND ALBUTEROL SULFATE 1 AMPULE: .5; 3 SOLUTION RESPIRATORY (INHALATION) at 16:42

## 2018-12-12 RX ADMIN — GUAIFENESIN AND DEXTROMETHORPHAN 5 ML: 100; 10 SYRUP ORAL at 15:16

## 2018-12-12 RX ADMIN — IPRATROPIUM BROMIDE AND ALBUTEROL SULFATE 1 AMPULE: .5; 3 SOLUTION RESPIRATORY (INHALATION) at 00:31

## 2018-12-12 RX ADMIN — SEVELAMER CARBONATE 800 MG: 800 TABLET, FILM COATED ORAL at 17:20

## 2018-12-12 RX ADMIN — IPRATROPIUM BROMIDE AND ALBUTEROL SULFATE 1 AMPULE: .5; 3 SOLUTION RESPIRATORY (INHALATION) at 23:49

## 2018-12-12 RX ADMIN — SODIUM CHLORIDE 2000 ML: 9 INJECTION, SOLUTION INTRAVENOUS at 09:58

## 2018-12-12 RX ADMIN — LEVOTHYROXINE SODIUM 25 MCG: 25 TABLET ORAL at 06:47

## 2018-12-12 RX ADMIN — Medication 10 ML: at 21:37

## 2018-12-12 RX ADMIN — PANTOPRAZOLE SODIUM 40 MG: 40 TABLET, DELAYED RELEASE ORAL at 06:47

## 2018-12-12 RX ADMIN — ROSUVASTATIN CALCIUM 20 MG: 10 TABLET, FILM COATED ORAL at 08:32

## 2018-12-12 RX ADMIN — DOCUSATE SODIUM 100 MG: 100 CAPSULE, LIQUID FILLED ORAL at 08:31

## 2018-12-12 RX ADMIN — IPRATROPIUM BROMIDE AND ALBUTEROL SULFATE 1 AMPULE: .5; 3 SOLUTION RESPIRATORY (INHALATION) at 08:41

## 2018-12-12 RX ADMIN — ASPIRIN 81 MG: 81 TABLET, COATED ORAL at 08:32

## 2018-12-12 ASSESSMENT — PAIN SCALES - GENERAL
PAINLEVEL_OUTOF10: 0

## 2018-12-13 ENCOUNTER — APPOINTMENT (OUTPATIENT)
Dept: CT IMAGING | Age: 78
DRG: 640 | End: 2018-12-13
Payer: MEDICARE

## 2018-12-13 ENCOUNTER — APPOINTMENT (OUTPATIENT)
Dept: GENERAL RADIOLOGY | Age: 78
DRG: 640 | End: 2018-12-13
Payer: MEDICARE

## 2018-12-13 LAB
ANION GAP SERPL CALCULATED.3IONS-SCNC: 9 MMOL/L (ref 3–16)
BASOPHILS ABSOLUTE: 0 K/UL (ref 0–0.2)
BASOPHILS RELATIVE PERCENT: 0.2 %
BUN BLDV-MCNC: 32 MG/DL (ref 7–20)
CALCIUM SERPL-MCNC: 8.7 MG/DL (ref 8.3–10.6)
CHLORIDE BLD-SCNC: 98 MMOL/L (ref 99–110)
CO2: 27 MMOL/L (ref 21–32)
CREAT SERPL-MCNC: 3.2 MG/DL (ref 0.6–1.2)
EOSINOPHILS ABSOLUTE: 0.2 K/UL (ref 0–0.6)
EOSINOPHILS RELATIVE PERCENT: 2.2 %
GFR AFRICAN AMERICAN: 17
GFR NON-AFRICAN AMERICAN: 14
GLUCOSE BLD-MCNC: 208 MG/DL (ref 70–99)
GLUCOSE BLD-MCNC: 217 MG/DL (ref 70–99)
GLUCOSE BLD-MCNC: 223 MG/DL (ref 70–99)
GLUCOSE BLD-MCNC: 258 MG/DL (ref 70–99)
HCT VFR BLD CALC: 28.9 % (ref 36–48)
HEMOGLOBIN: 9.6 G/DL (ref 12–16)
LYMPHOCYTES ABSOLUTE: 1.2 K/UL (ref 1–5.1)
LYMPHOCYTES RELATIVE PERCENT: 12.3 %
MAGNESIUM: 2 MG/DL (ref 1.8–2.4)
MCH RBC QN AUTO: 32.4 PG (ref 26–34)
MCHC RBC AUTO-ENTMCNC: 33.4 G/DL (ref 31–36)
MCV RBC AUTO: 97.1 FL (ref 80–100)
MONOCYTES ABSOLUTE: 1.5 K/UL (ref 0–1.3)
MONOCYTES RELATIVE PERCENT: 16 %
NEUTROPHILS ABSOLUTE: 6.6 K/UL (ref 1.7–7.7)
NEUTROPHILS RELATIVE PERCENT: 69.3 %
PDW BLD-RTO: 14.4 % (ref 12.4–15.4)
PERFORMED ON: ABNORMAL
PHOSPHORUS: 3.4 MG/DL (ref 2.5–4.9)
PLATELET # BLD: 143 K/UL (ref 135–450)
PMV BLD AUTO: 8.1 FL (ref 5–10.5)
POTASSIUM SERPL-SCNC: 4.4 MMOL/L (ref 3.5–5.1)
PRO-BNP: ABNORMAL PG/ML (ref 0–449)
RBC # BLD: 2.97 M/UL (ref 4–5.2)
SODIUM BLD-SCNC: 134 MMOL/L (ref 136–145)
TROPONIN: 0.02 NG/ML
TSH SERPL DL<=0.05 MIU/L-ACNC: 3.19 UIU/ML (ref 0.27–4.2)
WBC # BLD: 9.5 K/UL (ref 4–11)

## 2018-12-13 PROCEDURE — 71260 CT THORAX DX C+: CPT

## 2018-12-13 PROCEDURE — 99223 1ST HOSP IP/OBS HIGH 75: CPT | Performed by: INTERNAL MEDICINE

## 2018-12-13 PROCEDURE — 6360000004 HC RX CONTRAST MEDICATION: Performed by: INTERNAL MEDICINE

## 2018-12-13 PROCEDURE — 2580000003 HC RX 258: Performed by: INTERNAL MEDICINE

## 2018-12-13 PROCEDURE — 6370000000 HC RX 637 (ALT 250 FOR IP): Performed by: INTERNAL MEDICINE

## 2018-12-13 PROCEDURE — 2580000003 HC RX 258

## 2018-12-13 PROCEDURE — 1200000000 HC SEMI PRIVATE

## 2018-12-13 PROCEDURE — 94762 N-INVAS EAR/PLS OXIMTRY CONT: CPT

## 2018-12-13 PROCEDURE — 93005 ELECTROCARDIOGRAM TRACING: CPT | Performed by: INTERNAL MEDICINE

## 2018-12-13 PROCEDURE — 2000000000 HC ICU R&B

## 2018-12-13 PROCEDURE — 6360000002 HC RX W HCPCS: Performed by: INTERNAL MEDICINE

## 2018-12-13 PROCEDURE — 94640 AIRWAY INHALATION TREATMENT: CPT

## 2018-12-13 PROCEDURE — 84100 ASSAY OF PHOSPHORUS: CPT

## 2018-12-13 PROCEDURE — 6370000000 HC RX 637 (ALT 250 FOR IP): Performed by: PEDIATRICS

## 2018-12-13 PROCEDURE — 71045 X-RAY EXAM CHEST 1 VIEW: CPT

## 2018-12-13 PROCEDURE — 90937 HEMODIALYSIS REPEATED EVAL: CPT

## 2018-12-13 PROCEDURE — 80048 BASIC METABOLIC PNL TOTAL CA: CPT

## 2018-12-13 PROCEDURE — 94760 N-INVAS EAR/PLS OXIMETRY 1: CPT

## 2018-12-13 PROCEDURE — 2500000003 HC RX 250 WO HCPCS: Performed by: INTERNAL MEDICINE

## 2018-12-13 PROCEDURE — 83735 ASSAY OF MAGNESIUM: CPT

## 2018-12-13 PROCEDURE — 83880 ASSAY OF NATRIURETIC PEPTIDE: CPT

## 2018-12-13 PROCEDURE — 36415 COLL VENOUS BLD VENIPUNCTURE: CPT

## 2018-12-13 PROCEDURE — 84443 ASSAY THYROID STIM HORMONE: CPT

## 2018-12-13 PROCEDURE — 99291 CRITICAL CARE FIRST HOUR: CPT | Performed by: INTERNAL MEDICINE

## 2018-12-13 PROCEDURE — 85025 COMPLETE CBC W/AUTO DIFF WBC: CPT

## 2018-12-13 PROCEDURE — 84484 ASSAY OF TROPONIN QUANT: CPT

## 2018-12-13 PROCEDURE — 2700000000 HC OXYGEN THERAPY PER DAY

## 2018-12-13 PROCEDURE — 2580000003 HC RX 258: Performed by: PEDIATRICS

## 2018-12-13 RX ORDER — METOPROLOL TARTRATE 5 MG/5ML
5 INJECTION INTRAVENOUS EVERY 6 HOURS
Status: DISCONTINUED | OUTPATIENT
Start: 2018-12-13 | End: 2018-12-17 | Stop reason: HOSPADM

## 2018-12-13 RX ORDER — METHYLPREDNISOLONE SODIUM SUCCINATE 40 MG/ML
40 INJECTION, POWDER, LYOPHILIZED, FOR SOLUTION INTRAMUSCULAR; INTRAVENOUS EVERY 6 HOURS
Status: DISCONTINUED | OUTPATIENT
Start: 2018-12-13 | End: 2018-12-14

## 2018-12-13 RX ORDER — IPRATROPIUM BROMIDE AND ALBUTEROL SULFATE 2.5; .5 MG/3ML; MG/3ML
1 SOLUTION RESPIRATORY (INHALATION)
Status: DISCONTINUED | OUTPATIENT
Start: 2018-12-13 | End: 2018-12-13

## 2018-12-13 RX ORDER — 0.9 % SODIUM CHLORIDE 0.9 %
250 INTRAVENOUS SOLUTION INTRAVENOUS ONCE
Status: COMPLETED | OUTPATIENT
Start: 2018-12-13 | End: 2018-12-13

## 2018-12-13 RX ORDER — METOPROLOL TARTRATE 5 MG/5ML
INJECTION INTRAVENOUS
Status: DISPENSED
Start: 2018-12-13 | End: 2018-12-14

## 2018-12-13 RX ORDER — SODIUM CHLORIDE 9 MG/ML
INJECTION, SOLUTION INTRAVENOUS
Status: COMPLETED
Start: 2018-12-13 | End: 2018-12-13

## 2018-12-13 RX ORDER — DILTIAZEM HYDROCHLORIDE 5 MG/ML
10 INJECTION INTRAVENOUS ONCE
Status: DISCONTINUED | OUTPATIENT
Start: 2018-12-13 | End: 2018-12-14

## 2018-12-13 RX ORDER — HEPARIN SODIUM 5000 [USP'U]/ML
5000 INJECTION, SOLUTION INTRAVENOUS; SUBCUTANEOUS EVERY 8 HOURS SCHEDULED
Status: DISCONTINUED | OUTPATIENT
Start: 2018-12-13 | End: 2018-12-14

## 2018-12-13 RX ORDER — BENZONATATE 100 MG/1
100 CAPSULE ORAL 3 TIMES DAILY PRN
Status: DISCONTINUED | OUTPATIENT
Start: 2018-12-13 | End: 2018-12-17 | Stop reason: HOSPADM

## 2018-12-13 RX ADMIN — METHYLPREDNISOLONE SODIUM SUCCINATE 40 MG: 40 INJECTION, POWDER, FOR SOLUTION INTRAMUSCULAR; INTRAVENOUS at 16:42

## 2018-12-13 RX ADMIN — LEVOTHYROXINE SODIUM 25 MCG: 25 TABLET ORAL at 06:00

## 2018-12-13 RX ADMIN — HEPARIN SODIUM 3200 UNITS: 1000 INJECTION INTRAVENOUS; SUBCUTANEOUS at 09:39

## 2018-12-13 RX ADMIN — SEVELAMER CARBONATE 800 MG: 800 TABLET, FILM COATED ORAL at 08:26

## 2018-12-13 RX ADMIN — IPRATROPIUM BROMIDE AND ALBUTEROL SULFATE 1 AMPULE: .5; 3 SOLUTION RESPIRATORY (INHALATION) at 02:55

## 2018-12-13 RX ADMIN — IPRATROPIUM BROMIDE AND ALBUTEROL SULFATE 1 AMPULE: .5; 3 SOLUTION RESPIRATORY (INHALATION) at 15:25

## 2018-12-13 RX ADMIN — IOPAMIDOL 75 ML: 755 INJECTION, SOLUTION INTRAVENOUS at 22:25

## 2018-12-13 RX ADMIN — METHYLPREDNISOLONE SODIUM SUCCINATE 40 MG: 40 INJECTION, POWDER, FOR SOLUTION INTRAMUSCULAR; INTRAVENOUS at 21:31

## 2018-12-13 RX ADMIN — INSULIN LISPRO 22 UNITS: 100 INJECTION, SOLUTION INTRAVENOUS; SUBCUTANEOUS at 13:33

## 2018-12-13 RX ADMIN — GUAIFENESIN AND DEXTROMETHORPHAN 5 ML: 100; 10 SYRUP ORAL at 21:31

## 2018-12-13 RX ADMIN — ROSUVASTATIN CALCIUM 20 MG: 10 TABLET, FILM COATED ORAL at 13:32

## 2018-12-13 RX ADMIN — Medication 250 ML: at 17:35

## 2018-12-13 RX ADMIN — HEPARIN SODIUM 5000 UNITS: 5000 INJECTION INTRAVENOUS; SUBCUTANEOUS at 16:42

## 2018-12-13 RX ADMIN — IPRATROPIUM BROMIDE AND ALBUTEROL SULFATE 1 AMPULE: .5; 3 SOLUTION RESPIRATORY (INHALATION) at 23:42

## 2018-12-13 RX ADMIN — CARVEDILOL 12.5 MG: 6.25 TABLET, FILM COATED ORAL at 13:33

## 2018-12-13 RX ADMIN — IPRATROPIUM BROMIDE AND ALBUTEROL SULFATE 1 AMPULE: .5; 3 SOLUTION RESPIRATORY (INHALATION) at 19:42

## 2018-12-13 RX ADMIN — GUAIFENESIN AND DEXTROMETHORPHAN 5 ML: 100; 10 SYRUP ORAL at 08:28

## 2018-12-13 RX ADMIN — DEXTROSE MONOHYDRATE 150 MG: 50 INJECTION, SOLUTION INTRAVENOUS at 17:50

## 2018-12-13 RX ADMIN — SODIUM CHLORIDE 250 ML: 9 INJECTION, SOLUTION INTRAVENOUS at 17:35

## 2018-12-13 RX ADMIN — IPRATROPIUM BROMIDE AND ALBUTEROL SULFATE 1 AMPULE: .5; 3 SOLUTION RESPIRATORY (INHALATION) at 07:24

## 2018-12-13 RX ADMIN — CINACALCET HYDROCHLORIDE 30 MG: 30 TABLET, COATED ORAL at 08:24

## 2018-12-13 RX ADMIN — HEPARIN SODIUM 5000 UNITS: 5000 INJECTION INTRAVENOUS; SUBCUTANEOUS at 21:31

## 2018-12-13 RX ADMIN — METOPROLOL TARTRATE 5 MG: 5 INJECTION, SOLUTION INTRAVENOUS at 16:15

## 2018-12-13 RX ADMIN — BENZONATATE 100 MG: 100 CAPSULE ORAL at 02:45

## 2018-12-13 RX ADMIN — BENZONATATE 100 MG: 100 CAPSULE ORAL at 21:31

## 2018-12-13 RX ADMIN — AMIODARONE HYDROCHLORIDE 1 MG/MIN: 50 INJECTION, SOLUTION INTRAVENOUS at 18:06

## 2018-12-13 RX ADMIN — ASPIRIN 81 MG: 81 TABLET, COATED ORAL at 21:31

## 2018-12-13 RX ADMIN — DOCUSATE SODIUM 100 MG: 100 CAPSULE, LIQUID FILLED ORAL at 08:25

## 2018-12-13 RX ADMIN — Medication 10 ML: at 21:41

## 2018-12-13 RX ADMIN — PANTOPRAZOLE SODIUM 40 MG: 40 TABLET, DELAYED RELEASE ORAL at 06:00

## 2018-12-13 RX ADMIN — VITAMIN D, TAB 1000IU (100/BT) 1000 UNITS: 25 TAB at 08:25

## 2018-12-13 ASSESSMENT — ENCOUNTER SYMPTOMS
EYE REDNESS: 0
CHOKING: 0
RHINORRHEA: 0
DIARRHEA: 0
ABDOMINAL PAIN: 0
TROUBLE SWALLOWING: 0
APNEA: 0
FACIAL SWELLING: 0
BLOOD IN STOOL: 0
COLOR CHANGE: 0
CHEST TIGHTNESS: 0
STRIDOR: 0
SHORTNESS OF BREATH: 1
NAUSEA: 0
COUGH: 0
PHOTOPHOBIA: 0
EYE DISCHARGE: 0

## 2018-12-13 ASSESSMENT — PAIN SCALES - GENERAL
PAINLEVEL_OUTOF10: 0

## 2018-12-14 LAB
ANION GAP SERPL CALCULATED.3IONS-SCNC: 16 MMOL/L (ref 3–16)
BASOPHILS ABSOLUTE: 0 K/UL (ref 0–0.2)
BASOPHILS RELATIVE PERCENT: 0 %
BUN BLDV-MCNC: 59 MG/DL (ref 7–20)
CALCIUM SERPL-MCNC: 8.7 MG/DL (ref 8.3–10.6)
CHLORIDE BLD-SCNC: 95 MMOL/L (ref 99–110)
CO2: 20 MMOL/L (ref 21–32)
CREAT SERPL-MCNC: 5 MG/DL (ref 0.6–1.2)
EKG ATRIAL RATE: 117 BPM
EKG DIAGNOSIS: NORMAL
EKG Q-T INTERVAL: 324 MS
EKG QRS DURATION: 78 MS
EKG QTC CALCULATION (BAZETT): 474 MS
EKG R AXIS: 68 DEGREES
EKG T AXIS: 214 DEGREES
EKG VENTRICULAR RATE: 129 BPM
EOSINOPHILS ABSOLUTE: 0 K/UL (ref 0–0.6)
EOSINOPHILS RELATIVE PERCENT: 0 %
GFR AFRICAN AMERICAN: 10
GFR NON-AFRICAN AMERICAN: 8
GLUCOSE BLD-MCNC: 209 MG/DL (ref 70–99)
GLUCOSE BLD-MCNC: 378 MG/DL (ref 70–99)
GLUCOSE BLD-MCNC: 407 MG/DL (ref 70–99)
HCT VFR BLD CALC: 32.4 % (ref 36–48)
HEMOGLOBIN: 10.7 G/DL (ref 12–16)
LYMPHOCYTES ABSOLUTE: 0.5 K/UL (ref 1–5.1)
LYMPHOCYTES RELATIVE PERCENT: 6.4 %
MAGNESIUM: 2.1 MG/DL (ref 1.8–2.4)
MCH RBC QN AUTO: 32.2 PG (ref 26–34)
MCHC RBC AUTO-ENTMCNC: 33.1 G/DL (ref 31–36)
MCV RBC AUTO: 97.2 FL (ref 80–100)
MONOCYTES ABSOLUTE: 0.1 K/UL (ref 0–1.3)
MONOCYTES RELATIVE PERCENT: 1.1 %
NEUTROPHILS ABSOLUTE: 7.5 K/UL (ref 1.7–7.7)
NEUTROPHILS RELATIVE PERCENT: 92.5 %
PDW BLD-RTO: 14.7 % (ref 12.4–15.4)
PERFORMED ON: ABNORMAL
PERFORMED ON: ABNORMAL
PHOSPHORUS: 4.7 MG/DL (ref 2.5–4.9)
PLATELET # BLD: 141 K/UL (ref 135–450)
PMV BLD AUTO: 8.7 FL (ref 5–10.5)
POTASSIUM SERPL-SCNC: 5.4 MMOL/L (ref 3.5–5.1)
RBC # BLD: 3.34 M/UL (ref 4–5.2)
SODIUM BLD-SCNC: 131 MMOL/L (ref 136–145)
WBC # BLD: 8.1 K/UL (ref 4–11)

## 2018-12-14 PROCEDURE — 6370000000 HC RX 637 (ALT 250 FOR IP): Performed by: INTERNAL MEDICINE

## 2018-12-14 PROCEDURE — 6360000002 HC RX W HCPCS: Performed by: INTERNAL MEDICINE

## 2018-12-14 PROCEDURE — 94762 N-INVAS EAR/PLS OXIMTRY CONT: CPT

## 2018-12-14 PROCEDURE — 83735 ASSAY OF MAGNESIUM: CPT

## 2018-12-14 PROCEDURE — 99232 SBSQ HOSP IP/OBS MODERATE 35: CPT | Performed by: INTERNAL MEDICINE

## 2018-12-14 PROCEDURE — 2580000003 HC RX 258: Performed by: INTERNAL MEDICINE

## 2018-12-14 PROCEDURE — 2500000003 HC RX 250 WO HCPCS: Performed by: INTERNAL MEDICINE

## 2018-12-14 PROCEDURE — 6370000000 HC RX 637 (ALT 250 FOR IP): Performed by: PEDIATRICS

## 2018-12-14 PROCEDURE — 1200000000 HC SEMI PRIVATE

## 2018-12-14 PROCEDURE — 94640 AIRWAY INHALATION TREATMENT: CPT

## 2018-12-14 PROCEDURE — 36415 COLL VENOUS BLD VENIPUNCTURE: CPT

## 2018-12-14 PROCEDURE — 6370000000 HC RX 637 (ALT 250 FOR IP): Performed by: HOSPITALIST

## 2018-12-14 PROCEDURE — 36569 INSJ PICC 5 YR+ W/O IMAGING: CPT

## 2018-12-14 PROCEDURE — 99223 1ST HOSP IP/OBS HIGH 75: CPT | Performed by: INTERNAL MEDICINE

## 2018-12-14 PROCEDURE — 84100 ASSAY OF PHOSPHORUS: CPT

## 2018-12-14 PROCEDURE — 90937 HEMODIALYSIS REPEATED EVAL: CPT

## 2018-12-14 PROCEDURE — P9046 ALBUMIN (HUMAN), 25%, 20 ML: HCPCS | Performed by: INTERNAL MEDICINE

## 2018-12-14 PROCEDURE — 2700000000 HC OXYGEN THERAPY PER DAY

## 2018-12-14 PROCEDURE — 85025 COMPLETE CBC W/AUTO DIFF WBC: CPT

## 2018-12-14 PROCEDURE — 94760 N-INVAS EAR/PLS OXIMETRY 1: CPT

## 2018-12-14 PROCEDURE — 99233 SBSQ HOSP IP/OBS HIGH 50: CPT | Performed by: INTERNAL MEDICINE

## 2018-12-14 PROCEDURE — 2580000003 HC RX 258: Performed by: PEDIATRICS

## 2018-12-14 PROCEDURE — 93010 ELECTROCARDIOGRAM REPORT: CPT | Performed by: INTERNAL MEDICINE

## 2018-12-14 PROCEDURE — 02HV33Z INSERTION OF INFUSION DEVICE INTO SUPERIOR VENA CAVA, PERCUTANEOUS APPROACH: ICD-10-PCS | Performed by: INTERNAL MEDICINE

## 2018-12-14 PROCEDURE — 80048 BASIC METABOLIC PNL TOTAL CA: CPT

## 2018-12-14 PROCEDURE — 2000000000 HC ICU R&B

## 2018-12-14 RX ORDER — LIDOCAINE HYDROCHLORIDE 10 MG/ML
5 INJECTION, SOLUTION EPIDURAL; INFILTRATION; INTRACAUDAL; PERINEURAL ONCE
Status: DISCONTINUED | OUTPATIENT
Start: 2018-12-14 | End: 2018-12-17 | Stop reason: HOSPADM

## 2018-12-14 RX ORDER — SODIUM CHLORIDE 9 MG/ML
INJECTION, SOLUTION INTRAVENOUS
Status: DISPENSED
Start: 2018-12-14 | End: 2018-12-14

## 2018-12-14 RX ORDER — ALBUMIN (HUMAN) 12.5 G/50ML
50 SOLUTION INTRAVENOUS ONCE
Status: COMPLETED | OUTPATIENT
Start: 2018-12-14 | End: 2018-12-14

## 2018-12-14 RX ORDER — AMIODARONE HYDROCHLORIDE 200 MG/1
200 TABLET ORAL 2 TIMES DAILY
Status: DISCONTINUED | OUTPATIENT
Start: 2018-12-14 | End: 2018-12-17

## 2018-12-14 RX ORDER — SODIUM CHLORIDE 9 MG/ML
INJECTION, SOLUTION INTRAVENOUS
Status: DISPENSED
Start: 2018-12-14 | End: 2018-12-15

## 2018-12-14 RX ADMIN — CINACALCET HYDROCHLORIDE 30 MG: 30 TABLET, COATED ORAL at 09:30

## 2018-12-14 RX ADMIN — IPRATROPIUM BROMIDE AND ALBUTEROL SULFATE 1 AMPULE: .5; 3 SOLUTION RESPIRATORY (INHALATION) at 15:30

## 2018-12-14 RX ADMIN — IPRATROPIUM BROMIDE AND ALBUTEROL SULFATE 1 AMPULE: .5; 3 SOLUTION RESPIRATORY (INHALATION) at 07:48

## 2018-12-14 RX ADMIN — AMIODARONE HYDROCHLORIDE 200 MG: 200 TABLET ORAL at 12:19

## 2018-12-14 RX ADMIN — DOCUSATE SODIUM 100 MG: 100 CAPSULE, LIQUID FILLED ORAL at 09:00

## 2018-12-14 RX ADMIN — IPRATROPIUM BROMIDE AND ALBUTEROL SULFATE 1 AMPULE: .5; 3 SOLUTION RESPIRATORY (INHALATION) at 11:37

## 2018-12-14 RX ADMIN — CARVEDILOL 12.5 MG: 6.25 TABLET, FILM COATED ORAL at 09:30

## 2018-12-14 RX ADMIN — IPRATROPIUM BROMIDE AND ALBUTEROL SULFATE 1 AMPULE: .5; 3 SOLUTION RESPIRATORY (INHALATION) at 03:34

## 2018-12-14 RX ADMIN — HEPARIN SODIUM 5000 UNITS: 5000 INJECTION INTRAVENOUS; SUBCUTANEOUS at 06:12

## 2018-12-14 RX ADMIN — RIVAROXABAN 15 MG: 15 TABLET, FILM COATED ORAL at 17:40

## 2018-12-14 RX ADMIN — BENZONATATE 100 MG: 100 CAPSULE ORAL at 02:32

## 2018-12-14 RX ADMIN — LEVOTHYROXINE SODIUM 25 MCG: 25 TABLET ORAL at 06:12

## 2018-12-14 RX ADMIN — INSULIN LISPRO 20 UNITS: 100 INJECTION, SOLUTION INTRAVENOUS; SUBCUTANEOUS at 16:42

## 2018-12-14 RX ADMIN — METOPROLOL TARTRATE 5 MG: 5 INJECTION, SOLUTION INTRAVENOUS at 04:09

## 2018-12-14 RX ADMIN — ROSUVASTATIN CALCIUM 20 MG: 10 TABLET, FILM COATED ORAL at 09:30

## 2018-12-14 RX ADMIN — VITAMIN D, TAB 1000IU (100/BT) 1000 UNITS: 25 TAB at 09:30

## 2018-12-14 RX ADMIN — CARVEDILOL 12.5 MG: 6.25 TABLET, FILM COATED ORAL at 20:32

## 2018-12-14 RX ADMIN — INSULIN LISPRO 22 UNITS: 100 INJECTION, SOLUTION INTRAVENOUS; SUBCUTANEOUS at 09:30

## 2018-12-14 RX ADMIN — AMIODARONE HYDROCHLORIDE 0.5 MG/MIN: 50 INJECTION, SOLUTION INTRAVENOUS at 04:06

## 2018-12-14 RX ADMIN — ALBUMIN (HUMAN) 50 G: 0.25 INJECTION, SOLUTION INTRAVENOUS at 12:21

## 2018-12-14 RX ADMIN — IPRATROPIUM BROMIDE AND ALBUTEROL SULFATE 1 AMPULE: .5; 3 SOLUTION RESPIRATORY (INHALATION) at 20:06

## 2018-12-14 RX ADMIN — SEVELAMER CARBONATE 800 MG: 800 TABLET, FILM COATED ORAL at 09:30

## 2018-12-14 RX ADMIN — GUAIFENESIN AND DEXTROMETHORPHAN 5 ML: 100; 10 SYRUP ORAL at 02:32

## 2018-12-14 RX ADMIN — SEVELAMER CARBONATE 800 MG: 800 TABLET, FILM COATED ORAL at 16:43

## 2018-12-14 RX ADMIN — PANTOPRAZOLE SODIUM 40 MG: 40 TABLET, DELAYED RELEASE ORAL at 06:12

## 2018-12-14 RX ADMIN — Medication 10 ML: at 20:32

## 2018-12-14 RX ADMIN — HYDROCODONE BITARTRATE AND ACETAMINOPHEN 1 TABLET: 5; 325 TABLET ORAL at 02:32

## 2018-12-14 RX ADMIN — METHYLPREDNISOLONE SODIUM SUCCINATE 40 MG: 40 INJECTION, POWDER, FOR SOLUTION INTRAMUSCULAR; INTRAVENOUS at 04:09

## 2018-12-14 RX ADMIN — AMIODARONE HYDROCHLORIDE 200 MG: 200 TABLET ORAL at 20:32

## 2018-12-14 ASSESSMENT — ENCOUNTER SYMPTOMS
RHINORRHEA: 0
PHOTOPHOBIA: 0
EYE DISCHARGE: 0
EYE REDNESS: 0
CHEST TIGHTNESS: 0
NAUSEA: 0
BLOOD IN STOOL: 0
COUGH: 0
APNEA: 0
TROUBLE SWALLOWING: 0
ABDOMINAL PAIN: 0
STRIDOR: 0
SHORTNESS OF BREATH: 1
COLOR CHANGE: 0
CHOKING: 0
DIARRHEA: 0
FACIAL SWELLING: 0

## 2018-12-14 ASSESSMENT — PAIN SCALES - GENERAL
PAINLEVEL_OUTOF10: 0

## 2018-12-15 LAB
BLOOD CULTURE, ROUTINE: NORMAL
CULTURE, BLOOD 2: NORMAL
GLUCOSE BLD-MCNC: 122 MG/DL (ref 70–99)
GLUCOSE BLD-MCNC: 190 MG/DL (ref 70–99)
GLUCOSE BLD-MCNC: 241 MG/DL (ref 70–99)
GLUCOSE BLD-MCNC: 338 MG/DL (ref 70–99)
LEFT VENTRICULAR EJECTION FRACTION HIGH VALUE: 60 %
LEFT VENTRICULAR EJECTION FRACTION MODE: NORMAL
LV EF: 55 %
LV EF: 58 %
LVEF MODALITY: NORMAL
MAGNESIUM: 2.1 MG/DL (ref 1.8–2.4)
PERFORMED ON: ABNORMAL
PHOSPHORUS: 3.4 MG/DL (ref 2.5–4.9)

## 2018-12-15 PROCEDURE — 1200000000 HC SEMI PRIVATE

## 2018-12-15 PROCEDURE — 94762 N-INVAS EAR/PLS OXIMTRY CONT: CPT

## 2018-12-15 PROCEDURE — 2700000000 HC OXYGEN THERAPY PER DAY

## 2018-12-15 PROCEDURE — G8996 SWALLOW CURRENT STATUS: HCPCS

## 2018-12-15 PROCEDURE — G8987 SELF CARE CURRENT STATUS: HCPCS

## 2018-12-15 PROCEDURE — 97161 PT EVAL LOW COMPLEX 20 MIN: CPT

## 2018-12-15 PROCEDURE — 93306 TTE W/DOPPLER COMPLETE: CPT

## 2018-12-15 PROCEDURE — 97165 OT EVAL LOW COMPLEX 30 MIN: CPT

## 2018-12-15 PROCEDURE — 99233 SBSQ HOSP IP/OBS HIGH 50: CPT | Performed by: INTERNAL MEDICINE

## 2018-12-15 PROCEDURE — 6370000000 HC RX 637 (ALT 250 FOR IP): Performed by: PEDIATRICS

## 2018-12-15 PROCEDURE — 84100 ASSAY OF PHOSPHORUS: CPT

## 2018-12-15 PROCEDURE — 97530 THERAPEUTIC ACTIVITIES: CPT

## 2018-12-15 PROCEDURE — 94760 N-INVAS EAR/PLS OXIMETRY 1: CPT

## 2018-12-15 PROCEDURE — 2580000003 HC RX 258: Performed by: PEDIATRICS

## 2018-12-15 PROCEDURE — 2000000000 HC ICU R&B

## 2018-12-15 PROCEDURE — 92526 ORAL FUNCTION THERAPY: CPT

## 2018-12-15 PROCEDURE — 6370000000 HC RX 637 (ALT 250 FOR IP): Performed by: INTERNAL MEDICINE

## 2018-12-15 PROCEDURE — 94640 AIRWAY INHALATION TREATMENT: CPT

## 2018-12-15 PROCEDURE — 83735 ASSAY OF MAGNESIUM: CPT

## 2018-12-15 PROCEDURE — 92610 EVALUATE SWALLOWING FUNCTION: CPT

## 2018-12-15 PROCEDURE — G8997 SWALLOW GOAL STATUS: HCPCS

## 2018-12-15 PROCEDURE — G8978 MOBILITY CURRENT STATUS: HCPCS

## 2018-12-15 PROCEDURE — G8988 SELF CARE GOAL STATUS: HCPCS

## 2018-12-15 PROCEDURE — 2500000003 HC RX 250 WO HCPCS: Performed by: INTERNAL MEDICINE

## 2018-12-15 PROCEDURE — 36415 COLL VENOUS BLD VENIPUNCTURE: CPT

## 2018-12-15 PROCEDURE — G8979 MOBILITY GOAL STATUS: HCPCS

## 2018-12-15 RX ORDER — IPRATROPIUM BROMIDE AND ALBUTEROL SULFATE 2.5; .5 MG/3ML; MG/3ML
1 SOLUTION RESPIRATORY (INHALATION) EVERY 4 HOURS
Status: DISCONTINUED | OUTPATIENT
Start: 2018-12-15 | End: 2018-12-17 | Stop reason: HOSPADM

## 2018-12-15 RX ADMIN — IPRATROPIUM BROMIDE AND ALBUTEROL SULFATE 1 AMPULE: .5; 3 SOLUTION RESPIRATORY (INHALATION) at 15:31

## 2018-12-15 RX ADMIN — LEVOTHYROXINE SODIUM 25 MCG: 25 TABLET ORAL at 06:20

## 2018-12-15 RX ADMIN — IPRATROPIUM BROMIDE AND ALBUTEROL SULFATE 1 AMPULE: .5; 3 SOLUTION RESPIRATORY (INHALATION) at 11:44

## 2018-12-15 RX ADMIN — PANTOPRAZOLE SODIUM 40 MG: 40 TABLET, DELAYED RELEASE ORAL at 06:20

## 2018-12-15 RX ADMIN — IPRATROPIUM BROMIDE AND ALBUTEROL SULFATE 1 AMPULE: .5; 3 SOLUTION RESPIRATORY (INHALATION) at 04:38

## 2018-12-15 RX ADMIN — IPRATROPIUM BROMIDE AND ALBUTEROL SULFATE 1 AMPULE: .5; 3 SOLUTION RESPIRATORY (INHALATION) at 20:16

## 2018-12-15 RX ADMIN — IPRATROPIUM BROMIDE AND ALBUTEROL SULFATE 1 AMPULE: .5; 3 SOLUTION RESPIRATORY (INHALATION) at 08:29

## 2018-12-15 RX ADMIN — AMIODARONE HYDROCHLORIDE 200 MG: 200 TABLET ORAL at 09:13

## 2018-12-15 RX ADMIN — RIVAROXABAN 15 MG: 15 TABLET, FILM COATED ORAL at 18:20

## 2018-12-15 RX ADMIN — Medication 10 ML: at 21:15

## 2018-12-15 RX ADMIN — IPRATROPIUM BROMIDE AND ALBUTEROL SULFATE 1 AMPULE: .5; 3 SOLUTION RESPIRATORY (INHALATION) at 00:19

## 2018-12-15 RX ADMIN — CINACALCET HYDROCHLORIDE 30 MG: 30 TABLET, COATED ORAL at 09:13

## 2018-12-15 RX ADMIN — INSULIN LISPRO 20 UNITS: 100 INJECTION, SOLUTION INTRAVENOUS; SUBCUTANEOUS at 18:25

## 2018-12-15 RX ADMIN — AMIODARONE HYDROCHLORIDE 200 MG: 200 TABLET ORAL at 21:15

## 2018-12-15 RX ADMIN — GUAIFENESIN AND DEXTROMETHORPHAN 5 ML: 100; 10 SYRUP ORAL at 00:46

## 2018-12-15 RX ADMIN — INSULIN LISPRO 22 UNITS: 100 INJECTION, SOLUTION INTRAVENOUS; SUBCUTANEOUS at 07:32

## 2018-12-15 RX ADMIN — INSULIN GLARGINE 20 UNITS: 100 INJECTION, SOLUTION SUBCUTANEOUS at 10:31

## 2018-12-15 RX ADMIN — VITAMIN D, TAB 1000IU (100/BT) 1000 UNITS: 25 TAB at 09:14

## 2018-12-15 RX ADMIN — DOCUSATE SODIUM 100 MG: 100 CAPSULE, LIQUID FILLED ORAL at 09:13

## 2018-12-15 RX ADMIN — METOPROLOL TARTRATE 5 MG: 5 INJECTION, SOLUTION INTRAVENOUS at 23:02

## 2018-12-15 RX ADMIN — METOPROLOL TARTRATE 5 MG: 5 INJECTION, SOLUTION INTRAVENOUS at 10:31

## 2018-12-15 RX ADMIN — METOPROLOL TARTRATE 5 MG: 5 INJECTION, SOLUTION INTRAVENOUS at 16:30

## 2018-12-15 RX ADMIN — ROSUVASTATIN CALCIUM 20 MG: 10 TABLET, FILM COATED ORAL at 09:13

## 2018-12-15 RX ADMIN — SEVELAMER CARBONATE 800 MG: 800 TABLET, FILM COATED ORAL at 08:27

## 2018-12-15 RX ADMIN — CARVEDILOL 12.5 MG: 6.25 TABLET, FILM COATED ORAL at 08:27

## 2018-12-15 RX ADMIN — SEVELAMER CARBONATE 800 MG: 800 TABLET, FILM COATED ORAL at 18:20

## 2018-12-15 RX ADMIN — IPRATROPIUM BROMIDE AND ALBUTEROL SULFATE 1 AMPULE: .5; 3 SOLUTION RESPIRATORY (INHALATION) at 23:27

## 2018-12-15 RX ADMIN — CARVEDILOL 12.5 MG: 6.25 TABLET, FILM COATED ORAL at 21:15

## 2018-12-15 RX ADMIN — Medication 10 ML: at 09:13

## 2018-12-15 ASSESSMENT — PAIN SCALES - GENERAL
PAINLEVEL_OUTOF10: 0

## 2018-12-16 LAB
ANION GAP SERPL CALCULATED.3IONS-SCNC: 18 MMOL/L (ref 3–16)
BUN BLDV-MCNC: 72 MG/DL (ref 7–20)
CALCIUM SERPL-MCNC: 8.8 MG/DL (ref 8.3–10.6)
CHLORIDE BLD-SCNC: 94 MMOL/L (ref 99–110)
CO2: 23 MMOL/L (ref 21–32)
CREAT SERPL-MCNC: 5.5 MG/DL (ref 0.6–1.2)
GFR AFRICAN AMERICAN: 9
GFR NON-AFRICAN AMERICAN: 7
GLUCOSE BLD-MCNC: 142 MG/DL (ref 70–99)
GLUCOSE BLD-MCNC: 180 MG/DL (ref 70–99)
GLUCOSE BLD-MCNC: 216 MG/DL (ref 70–99)
GLUCOSE BLD-MCNC: 221 MG/DL (ref 70–99)
GLUCOSE BLD-MCNC: 311 MG/DL (ref 70–99)
MAGNESIUM: 1.9 MG/DL (ref 1.8–2.4)
PERFORMED ON: ABNORMAL
PHOSPHORUS: 3.2 MG/DL (ref 2.5–4.9)
POTASSIUM SERPL-SCNC: 3.9 MMOL/L (ref 3.5–5.1)
SODIUM BLD-SCNC: 135 MMOL/L (ref 136–145)

## 2018-12-16 PROCEDURE — 2500000003 HC RX 250 WO HCPCS: Performed by: INTERNAL MEDICINE

## 2018-12-16 PROCEDURE — 94640 AIRWAY INHALATION TREATMENT: CPT

## 2018-12-16 PROCEDURE — 36415 COLL VENOUS BLD VENIPUNCTURE: CPT

## 2018-12-16 PROCEDURE — 6370000000 HC RX 637 (ALT 250 FOR IP): Performed by: INTERNAL MEDICINE

## 2018-12-16 PROCEDURE — 1200000000 HC SEMI PRIVATE

## 2018-12-16 PROCEDURE — 83735 ASSAY OF MAGNESIUM: CPT

## 2018-12-16 PROCEDURE — 84100 ASSAY OF PHOSPHORUS: CPT

## 2018-12-16 PROCEDURE — 6370000000 HC RX 637 (ALT 250 FOR IP): Performed by: PEDIATRICS

## 2018-12-16 PROCEDURE — 6370000000 HC RX 637 (ALT 250 FOR IP): Performed by: HOSPITALIST

## 2018-12-16 PROCEDURE — 2580000003 HC RX 258: Performed by: PEDIATRICS

## 2018-12-16 PROCEDURE — 80048 BASIC METABOLIC PNL TOTAL CA: CPT

## 2018-12-16 PROCEDURE — 94760 N-INVAS EAR/PLS OXIMETRY 1: CPT

## 2018-12-16 RX ADMIN — INSULIN LISPRO 8 UNITS: 100 INJECTION, SOLUTION INTRAVENOUS; SUBCUTANEOUS at 12:07

## 2018-12-16 RX ADMIN — IPRATROPIUM BROMIDE AND ALBUTEROL SULFATE 1 AMPULE: .5; 3 SOLUTION RESPIRATORY (INHALATION) at 23:31

## 2018-12-16 RX ADMIN — INSULIN GLARGINE 20 UNITS: 100 INJECTION, SOLUTION SUBCUTANEOUS at 20:29

## 2018-12-16 RX ADMIN — Medication 10 ML: at 07:55

## 2018-12-16 RX ADMIN — SEVELAMER CARBONATE 800 MG: 800 TABLET, FILM COATED ORAL at 07:54

## 2018-12-16 RX ADMIN — METOPROLOL TARTRATE 5 MG: 5 INJECTION, SOLUTION INTRAVENOUS at 10:26

## 2018-12-16 RX ADMIN — IPRATROPIUM BROMIDE AND ALBUTEROL SULFATE 1 AMPULE: .5; 3 SOLUTION RESPIRATORY (INHALATION) at 08:46

## 2018-12-16 RX ADMIN — DOCUSATE SODIUM 100 MG: 100 CAPSULE, LIQUID FILLED ORAL at 07:55

## 2018-12-16 RX ADMIN — IPRATROPIUM BROMIDE AND ALBUTEROL SULFATE 1 AMPULE: .5; 3 SOLUTION RESPIRATORY (INHALATION) at 03:55

## 2018-12-16 RX ADMIN — IPRATROPIUM BROMIDE AND ALBUTEROL SULFATE 1 AMPULE: .5; 3 SOLUTION RESPIRATORY (INHALATION) at 12:06

## 2018-12-16 RX ADMIN — INSULIN LISPRO 20 UNITS: 100 INJECTION, SOLUTION INTRAVENOUS; SUBCUTANEOUS at 16:24

## 2018-12-16 RX ADMIN — CARVEDILOL 12.5 MG: 6.25 TABLET, FILM COATED ORAL at 07:54

## 2018-12-16 RX ADMIN — CINACALCET HYDROCHLORIDE 30 MG: 30 TABLET, COATED ORAL at 07:54

## 2018-12-16 RX ADMIN — GUAIFENESIN AND DEXTROMETHORPHAN 5 ML: 100; 10 SYRUP ORAL at 20:34

## 2018-12-16 RX ADMIN — METOPROLOL TARTRATE 5 MG: 5 INJECTION, SOLUTION INTRAVENOUS at 21:57

## 2018-12-16 RX ADMIN — RIVAROXABAN 15 MG: 15 TABLET, FILM COATED ORAL at 16:23

## 2018-12-16 RX ADMIN — CARVEDILOL 12.5 MG: 6.25 TABLET, FILM COATED ORAL at 20:19

## 2018-12-16 RX ADMIN — INSULIN LISPRO 22 UNITS: 100 INJECTION, SOLUTION INTRAVENOUS; SUBCUTANEOUS at 07:51

## 2018-12-16 RX ADMIN — IPRATROPIUM BROMIDE AND ALBUTEROL SULFATE 1 AMPULE: .5; 3 SOLUTION RESPIRATORY (INHALATION) at 16:35

## 2018-12-16 RX ADMIN — METOPROLOL TARTRATE 5 MG: 5 INJECTION, SOLUTION INTRAVENOUS at 05:53

## 2018-12-16 RX ADMIN — METOPROLOL TARTRATE 5 MG: 5 INJECTION, SOLUTION INTRAVENOUS at 16:23

## 2018-12-16 RX ADMIN — IPRATROPIUM BROMIDE AND ALBUTEROL SULFATE 1 AMPULE: .5; 3 SOLUTION RESPIRATORY (INHALATION) at 20:10

## 2018-12-16 RX ADMIN — SEVELAMER CARBONATE 800 MG: 800 TABLET, FILM COATED ORAL at 16:23

## 2018-12-16 RX ADMIN — Medication 10 ML: at 20:22

## 2018-12-16 RX ADMIN — VITAMIN D, TAB 1000IU (100/BT) 1000 UNITS: 25 TAB at 07:55

## 2018-12-16 RX ADMIN — AMIODARONE HYDROCHLORIDE 200 MG: 200 TABLET ORAL at 07:54

## 2018-12-16 RX ADMIN — BENZONATATE 100 MG: 100 CAPSULE ORAL at 21:57

## 2018-12-16 RX ADMIN — PANTOPRAZOLE SODIUM 40 MG: 40 TABLET, DELAYED RELEASE ORAL at 06:00

## 2018-12-16 RX ADMIN — ROSUVASTATIN CALCIUM 20 MG: 10 TABLET, FILM COATED ORAL at 07:54

## 2018-12-16 RX ADMIN — LEVOTHYROXINE SODIUM 25 MCG: 25 TABLET ORAL at 06:00

## 2018-12-16 RX ADMIN — AMIODARONE HYDROCHLORIDE 200 MG: 200 TABLET ORAL at 20:19

## 2018-12-16 ASSESSMENT — PAIN SCALES - GENERAL
PAINLEVEL_OUTOF10: 0

## 2018-12-16 ASSESSMENT — PAIN SCALES - WONG BAKER: WONGBAKER_NUMERICALRESPONSE: 0

## 2018-12-17 ENCOUNTER — TELEPHONE (OUTPATIENT)
Dept: PULMONOLOGY | Age: 78
End: 2018-12-17

## 2018-12-17 ENCOUNTER — APPOINTMENT (OUTPATIENT)
Dept: GENERAL RADIOLOGY | Age: 78
DRG: 640 | End: 2018-12-17
Payer: MEDICARE

## 2018-12-17 VITALS
OXYGEN SATURATION: 95 % | TEMPERATURE: 97.2 F | HEIGHT: 62 IN | HEART RATE: 60 BPM | BODY MASS INDEX: 40.69 KG/M2 | RESPIRATION RATE: 16 BRPM | DIASTOLIC BLOOD PRESSURE: 48 MMHG | WEIGHT: 221.12 LBS | SYSTOLIC BLOOD PRESSURE: 134 MMHG

## 2018-12-17 LAB
ANION GAP SERPL CALCULATED.3IONS-SCNC: 16 MMOL/L (ref 3–16)
BUN BLDV-MCNC: 91 MG/DL (ref 7–20)
CALCIUM SERPL-MCNC: 8.8 MG/DL (ref 8.3–10.6)
CHLORIDE BLD-SCNC: 95 MMOL/L (ref 99–110)
CO2: 22 MMOL/L (ref 21–32)
CREAT SERPL-MCNC: 7.2 MG/DL (ref 0.6–1.2)
GFR AFRICAN AMERICAN: 7
GFR NON-AFRICAN AMERICAN: 5
GLUCOSE BLD-MCNC: 112 MG/DL (ref 70–99)
GLUCOSE BLD-MCNC: 164 MG/DL (ref 70–99)
HCT VFR BLD CALC: 28.5 % (ref 36–48)
HEMOGLOBIN: 9.4 G/DL (ref 12–16)
MAGNESIUM: 1.9 MG/DL (ref 1.8–2.4)
MCH RBC QN AUTO: 31.1 PG (ref 26–34)
MCHC RBC AUTO-ENTMCNC: 32.9 G/DL (ref 31–36)
MCV RBC AUTO: 94.6 FL (ref 80–100)
PDW BLD-RTO: 15.1 % (ref 12.4–15.4)
PERFORMED ON: ABNORMAL
PHOSPHORUS: 3.5 MG/DL (ref 2.5–4.9)
PLATELET # BLD: 202 K/UL (ref 135–450)
PMV BLD AUTO: 8.5 FL (ref 5–10.5)
POTASSIUM SERPL-SCNC: 3.9 MMOL/L (ref 3.5–5.1)
RBC # BLD: 3.01 M/UL (ref 4–5.2)
SODIUM BLD-SCNC: 133 MMOL/L (ref 136–145)
WBC # BLD: 15.8 K/UL (ref 4–11)

## 2018-12-17 PROCEDURE — 80048 BASIC METABOLIC PNL TOTAL CA: CPT

## 2018-12-17 PROCEDURE — 36415 COLL VENOUS BLD VENIPUNCTURE: CPT

## 2018-12-17 PROCEDURE — 94760 N-INVAS EAR/PLS OXIMETRY 1: CPT

## 2018-12-17 PROCEDURE — 99232 SBSQ HOSP IP/OBS MODERATE 35: CPT | Performed by: NURSE PRACTITIONER

## 2018-12-17 PROCEDURE — 87040 BLOOD CULTURE FOR BACTERIA: CPT

## 2018-12-17 PROCEDURE — 71045 X-RAY EXAM CHEST 1 VIEW: CPT

## 2018-12-17 PROCEDURE — 99232 SBSQ HOSP IP/OBS MODERATE 35: CPT | Performed by: INTERNAL MEDICINE

## 2018-12-17 PROCEDURE — 6370000000 HC RX 637 (ALT 250 FOR IP): Performed by: INTERNAL MEDICINE

## 2018-12-17 PROCEDURE — 2500000003 HC RX 250 WO HCPCS: Performed by: INTERNAL MEDICINE

## 2018-12-17 PROCEDURE — 84100 ASSAY OF PHOSPHORUS: CPT

## 2018-12-17 PROCEDURE — 2580000003 HC RX 258: Performed by: PEDIATRICS

## 2018-12-17 PROCEDURE — 6370000000 HC RX 637 (ALT 250 FOR IP): Performed by: PEDIATRICS

## 2018-12-17 PROCEDURE — 94640 AIRWAY INHALATION TREATMENT: CPT

## 2018-12-17 PROCEDURE — 83735 ASSAY OF MAGNESIUM: CPT

## 2018-12-17 PROCEDURE — 6370000000 HC RX 637 (ALT 250 FOR IP): Performed by: NURSE PRACTITIONER

## 2018-12-17 PROCEDURE — 90937 HEMODIALYSIS REPEATED EVAL: CPT

## 2018-12-17 PROCEDURE — 85027 COMPLETE CBC AUTOMATED: CPT

## 2018-12-17 RX ORDER — AMIODARONE HYDROCHLORIDE 200 MG/1
200 TABLET ORAL DAILY
Status: DISCONTINUED | OUTPATIENT
Start: 2018-12-17 | End: 2018-12-17 | Stop reason: HOSPADM

## 2018-12-17 RX ORDER — BENZONATATE 100 MG/1
100 CAPSULE ORAL 3 TIMES DAILY PRN
Qty: 30 CAPSULE | Refills: 0 | Status: SHIPPED | OUTPATIENT
Start: 2018-12-17 | End: 2018-12-24

## 2018-12-17 RX ORDER — GUAIFENESIN/DEXTROMETHORPHAN 100-10MG/5
5 SYRUP ORAL EVERY 4 HOURS PRN
Qty: 120 ML | Refills: 0 | Status: SHIPPED | OUTPATIENT
Start: 2018-12-17 | End: 2018-12-27

## 2018-12-17 RX ADMIN — ROSUVASTATIN CALCIUM 20 MG: 10 TABLET, FILM COATED ORAL at 12:28

## 2018-12-17 RX ADMIN — CINACALCET HYDROCHLORIDE 30 MG: 30 TABLET, COATED ORAL at 12:28

## 2018-12-17 RX ADMIN — HYDROCODONE BITARTRATE AND ACETAMINOPHEN 1 TABLET: 5; 325 TABLET ORAL at 00:39

## 2018-12-17 RX ADMIN — Medication 10 ML: at 12:29

## 2018-12-17 RX ADMIN — SEVELAMER CARBONATE 800 MG: 800 TABLET, FILM COATED ORAL at 16:07

## 2018-12-17 RX ADMIN — METOPROLOL TARTRATE 5 MG: 5 INJECTION, SOLUTION INTRAVENOUS at 04:11

## 2018-12-17 RX ADMIN — CARVEDILOL 12.5 MG: 6.25 TABLET, FILM COATED ORAL at 12:28

## 2018-12-17 RX ADMIN — AMIODARONE HYDROCHLORIDE 200 MG: 200 TABLET ORAL at 12:31

## 2018-12-17 RX ADMIN — VITAMIN D, TAB 1000IU (100/BT) 1000 UNITS: 25 TAB at 12:28

## 2018-12-17 RX ADMIN — LEVOTHYROXINE SODIUM 25 MCG: 25 TABLET ORAL at 12:28

## 2018-12-17 RX ADMIN — IPRATROPIUM BROMIDE AND ALBUTEROL SULFATE 1 AMPULE: .5; 3 SOLUTION RESPIRATORY (INHALATION) at 16:13

## 2018-12-17 RX ADMIN — IPRATROPIUM BROMIDE AND ALBUTEROL SULFATE 1 AMPULE: .5; 3 SOLUTION RESPIRATORY (INHALATION) at 03:26

## 2018-12-17 RX ADMIN — PANTOPRAZOLE SODIUM 40 MG: 40 TABLET, DELAYED RELEASE ORAL at 12:28

## 2018-12-17 RX ADMIN — RIVAROXABAN 15 MG: 15 TABLET, FILM COATED ORAL at 16:07

## 2018-12-17 RX ADMIN — CARVEDILOL 12.5 MG: 6.25 TABLET, FILM COATED ORAL at 16:07

## 2018-12-17 RX ADMIN — INSULIN LISPRO 2 UNITS: 100 INJECTION, SOLUTION INTRAVENOUS; SUBCUTANEOUS at 12:12

## 2018-12-17 RX ADMIN — IPRATROPIUM BROMIDE AND ALBUTEROL SULFATE 1 AMPULE: .5; 3 SOLUTION RESPIRATORY (INHALATION) at 12:15

## 2018-12-17 RX ADMIN — DOCUSATE SODIUM 100 MG: 100 CAPSULE, LIQUID FILLED ORAL at 12:28

## 2018-12-17 ASSESSMENT — PAIN SCALES - WONG BAKER
WONGBAKER_NUMERICALRESPONSE: 0

## 2018-12-17 ASSESSMENT — PAIN SCALES - GENERAL: PAINLEVEL_OUTOF10: 5

## 2018-12-17 ASSESSMENT — ENCOUNTER SYMPTOMS
EYE DISCHARGE: 0
COLOR CHANGE: 0
EYE REDNESS: 0
ABDOMINAL PAIN: 0
COUGH: 0
CHOKING: 0
RHINORRHEA: 0
SHORTNESS OF BREATH: 1
BLOOD IN STOOL: 0
TROUBLE SWALLOWING: 0
PHOTOPHOBIA: 0
DIARRHEA: 0
STRIDOR: 0
APNEA: 0
CHEST TIGHTNESS: 0
NAUSEA: 0
FACIAL SWELLING: 0

## 2018-12-22 LAB
BLOOD CULTURE, ROUTINE: NORMAL
CULTURE, BLOOD 2: NORMAL

## 2018-12-27 ENCOUNTER — OFFICE VISIT (OUTPATIENT)
Dept: CARDIOLOGY CLINIC | Age: 78
End: 2018-12-27
Payer: MEDICARE

## 2018-12-27 VITALS
DIASTOLIC BLOOD PRESSURE: 50 MMHG | WEIGHT: 222 LBS | HEIGHT: 62 IN | HEART RATE: 64 BPM | BODY MASS INDEX: 40.85 KG/M2 | SYSTOLIC BLOOD PRESSURE: 118 MMHG

## 2018-12-27 DIAGNOSIS — I48.91 ATRIAL FIBRILLATION, UNSPECIFIED TYPE (HCC): Primary | ICD-10-CM

## 2018-12-27 DIAGNOSIS — I10 ESSENTIAL HYPERTENSION: Chronic | ICD-10-CM

## 2018-12-27 DIAGNOSIS — N18.6 ESRD ON DIALYSIS (HCC): ICD-10-CM

## 2018-12-27 DIAGNOSIS — Z99.2 ESRD ON DIALYSIS (HCC): ICD-10-CM

## 2018-12-27 DIAGNOSIS — I25.10 ATHEROSCLEROSIS OF NATIVE CORONARY ARTERY OF NATIVE HEART WITHOUT ANGINA PECTORIS: Chronic | ICD-10-CM

## 2018-12-27 DIAGNOSIS — E78.2 MIXED HYPERLIPIDEMIA: Chronic | ICD-10-CM

## 2018-12-27 PROCEDURE — 1101F PT FALLS ASSESS-DOCD LE1/YR: CPT | Performed by: INTERNAL MEDICINE

## 2018-12-27 PROCEDURE — 1090F PRES/ABSN URINE INCON ASSESS: CPT | Performed by: INTERNAL MEDICINE

## 2018-12-27 PROCEDURE — G8484 FLU IMMUNIZE NO ADMIN: HCPCS | Performed by: INTERNAL MEDICINE

## 2018-12-27 PROCEDURE — 4040F PNEUMOC VAC/ADMIN/RCVD: CPT | Performed by: INTERNAL MEDICINE

## 2018-12-27 PROCEDURE — 1036F TOBACCO NON-USER: CPT | Performed by: INTERNAL MEDICINE

## 2018-12-27 PROCEDURE — G8598 ASA/ANTIPLAT THER USED: HCPCS | Performed by: INTERNAL MEDICINE

## 2018-12-27 PROCEDURE — 1123F ACP DISCUSS/DSCN MKR DOCD: CPT | Performed by: INTERNAL MEDICINE

## 2018-12-27 PROCEDURE — 1111F DSCHRG MED/CURRENT MED MERGE: CPT | Performed by: INTERNAL MEDICINE

## 2018-12-27 PROCEDURE — 99214 OFFICE O/P EST MOD 30 MIN: CPT | Performed by: INTERNAL MEDICINE

## 2018-12-27 PROCEDURE — G8400 PT W/DXA NO RESULTS DOC: HCPCS | Performed by: INTERNAL MEDICINE

## 2018-12-27 PROCEDURE — G8427 DOCREV CUR MEDS BY ELIG CLIN: HCPCS | Performed by: INTERNAL MEDICINE

## 2018-12-27 PROCEDURE — G8417 CALC BMI ABV UP PARAM F/U: HCPCS | Performed by: INTERNAL MEDICINE

## 2018-12-27 RX ORDER — WARFARIN SODIUM 2 MG/1
TABLET ORAL
Qty: 30 TABLET | Refills: 5 | Status: SHIPPED | OUTPATIENT
Start: 2018-12-27 | End: 2019-04-23 | Stop reason: SDUPTHER

## 2018-12-27 RX ORDER — BENZONATATE 100 MG/1
100 CAPSULE ORAL 3 TIMES DAILY PRN
COMMUNITY
End: 2019-08-06 | Stop reason: ALTCHOICE

## 2018-12-27 NOTE — PROGRESS NOTES
--status post CABG  Stable  Has not needed any NTG   5. Anemia  hgb 9.4 hct 28.5-12/17/18   6. Renal insufficiency--now on dialysis  Creat 5.5- on hemodialysys  7. Diastolic dysfunction--will continue to monitor blood pressure  8. Carotid bruit-50-79% bilateral.   9. Atrial fibrillation-- Stop Xarelto and start Coumadin 2 mg daily. INR next week with dialysis. She remains in sinus on visit today    Plan  Stop Xarelto  Start Coumadin 2 mg daily  INR next week in dialysis  Follow up in 6 months    I appreciate the opportunity of cooperating in the care of this individual.    Andrew Sena MD   Scribe's Attestation: This note was scribed in the presence of Dr. Jose Alfredo Sena MD by Jadon Bennett RN. The scribe's documentation has been prepared under my direction and personally reviewed by me in its entirety. I confirm that the note above accurately reflects all work, treatment, procedures, and medical decision making performed by me.

## 2019-01-02 ENCOUNTER — ANTI-COAG VISIT (OUTPATIENT)
Dept: CARDIOLOGY CLINIC | Age: 79
End: 2019-01-02

## 2019-01-02 ENCOUNTER — TELEPHONE (OUTPATIENT)
Dept: CARDIOLOGY CLINIC | Age: 79
End: 2019-01-02

## 2019-01-02 LAB — INR BLD: 2

## 2019-01-07 ENCOUNTER — ANTI-COAG VISIT (OUTPATIENT)
Dept: CARDIOLOGY CLINIC | Age: 79
End: 2019-01-07

## 2019-01-07 ENCOUNTER — TELEPHONE (OUTPATIENT)
Dept: CARDIOLOGY CLINIC | Age: 79
End: 2019-01-07

## 2019-01-07 LAB — INR BLD: 2.8

## 2019-01-14 ENCOUNTER — TELEPHONE (OUTPATIENT)
Dept: CARDIOLOGY CLINIC | Age: 79
End: 2019-01-14

## 2019-01-14 ENCOUNTER — ANTI-COAG VISIT (OUTPATIENT)
Dept: CARDIOLOGY CLINIC | Age: 79
End: 2019-01-14

## 2019-01-14 LAB — INR BLD: 3.5

## 2019-01-21 ENCOUNTER — TELEPHONE (OUTPATIENT)
Dept: CARDIOLOGY CLINIC | Age: 79
End: 2019-01-21

## 2019-01-21 ENCOUNTER — ANTI-COAG VISIT (OUTPATIENT)
Dept: CARDIOLOGY CLINIC | Age: 79
End: 2019-01-21

## 2019-01-21 LAB — INR BLD: 3

## 2019-01-28 ENCOUNTER — ANTI-COAG VISIT (OUTPATIENT)
Dept: CARDIOLOGY CLINIC | Age: 79
End: 2019-01-28

## 2019-01-28 ENCOUNTER — TELEPHONE (OUTPATIENT)
Dept: CARDIOLOGY CLINIC | Age: 79
End: 2019-01-28

## 2019-01-28 LAB — INR BLD: 3.5

## 2019-02-04 ENCOUNTER — ANTI-COAG VISIT (OUTPATIENT)
Dept: CARDIOLOGY CLINIC | Age: 79
End: 2019-02-04

## 2019-02-04 ENCOUNTER — TELEPHONE (OUTPATIENT)
Dept: CARDIOLOGY CLINIC | Age: 79
End: 2019-02-04

## 2019-02-04 LAB — INR BLD: 2.3

## 2019-02-18 ENCOUNTER — ANTI-COAG VISIT (OUTPATIENT)
Dept: CARDIOLOGY CLINIC | Age: 79
End: 2019-02-18

## 2019-02-18 ENCOUNTER — TELEPHONE (OUTPATIENT)
Dept: CARDIOLOGY CLINIC | Age: 79
End: 2019-02-18

## 2019-02-18 LAB — INR BLD: 2.2

## 2019-02-25 ENCOUNTER — ANTI-COAG VISIT (OUTPATIENT)
Dept: CARDIOLOGY CLINIC | Age: 79
End: 2019-02-25

## 2019-02-25 ENCOUNTER — TELEPHONE (OUTPATIENT)
Dept: CARDIOLOGY CLINIC | Age: 79
End: 2019-02-25

## 2019-02-25 LAB — INR BLD: 2.3

## 2019-03-04 ENCOUNTER — TELEPHONE (OUTPATIENT)
Dept: CARDIOLOGY CLINIC | Age: 79
End: 2019-03-04

## 2019-03-04 ENCOUNTER — ANTI-COAG VISIT (OUTPATIENT)
Dept: CARDIOLOGY CLINIC | Age: 79
End: 2019-03-04

## 2019-03-04 LAB — INR BLD: 1.8

## 2019-03-11 ENCOUNTER — ANTI-COAG VISIT (OUTPATIENT)
Dept: CARDIOLOGY CLINIC | Age: 79
End: 2019-03-11

## 2019-03-11 ENCOUNTER — TELEPHONE (OUTPATIENT)
Dept: CARDIOLOGY CLINIC | Age: 79
End: 2019-03-11

## 2019-03-11 LAB — INR BLD: 2.6

## 2019-03-18 ENCOUNTER — ANTI-COAG VISIT (OUTPATIENT)
Dept: CARDIOLOGY CLINIC | Age: 79
End: 2019-03-18

## 2019-03-18 ENCOUNTER — TELEPHONE (OUTPATIENT)
Dept: CARDIOLOGY CLINIC | Age: 79
End: 2019-03-18

## 2019-03-18 LAB — INR BLD: 2.3

## 2019-03-25 ENCOUNTER — TELEPHONE (OUTPATIENT)
Dept: CARDIOLOGY CLINIC | Age: 79
End: 2019-03-25

## 2019-03-25 ENCOUNTER — ANTI-COAG VISIT (OUTPATIENT)
Dept: CARDIOLOGY CLINIC | Age: 79
End: 2019-03-25

## 2019-03-25 LAB — INR BLD: 1.9

## 2019-04-01 ENCOUNTER — ANTI-COAG VISIT (OUTPATIENT)
Dept: CARDIOLOGY CLINIC | Age: 79
End: 2019-04-01

## 2019-04-01 ENCOUNTER — TELEPHONE (OUTPATIENT)
Dept: CARDIOLOGY CLINIC | Age: 79
End: 2019-04-01

## 2019-04-01 LAB — INR BLD: 2.4

## 2019-04-01 NOTE — TELEPHONE ENCOUNTER
Coumadin/PT/INR    PT:29    INR:2.4    Where do you have your blood drawn?  (lab/HHRN/Home Monitor) [facility]     When was it drawn? 4/1/19    Why do you take Coumadin/Warfarin? Current medication dosage and instructions? Have you missed any doses? No   Any change in your other medications? no  Are you taking an antibiotic?  no  Any dietary changes?   no

## 2019-04-08 ENCOUNTER — TELEPHONE (OUTPATIENT)
Dept: CARDIOLOGY CLINIC | Age: 79
End: 2019-04-08

## 2019-04-08 ENCOUNTER — ANTI-COAG VISIT (OUTPATIENT)
Dept: CARDIOLOGY CLINIC | Age: 79
End: 2019-04-08

## 2019-04-15 ENCOUNTER — ANTI-COAG VISIT (OUTPATIENT)
Dept: CARDIOLOGY CLINIC | Age: 79
End: 2019-04-15

## 2019-04-15 ENCOUNTER — TELEPHONE (OUTPATIENT)
Dept: CARDIOLOGY CLINIC | Age: 79
End: 2019-04-15

## 2019-04-15 LAB — INR BLD: 1.6

## 2019-04-22 ENCOUNTER — ANTI-COAG VISIT (OUTPATIENT)
Dept: CARDIOLOGY CLINIC | Age: 79
End: 2019-04-22

## 2019-04-22 ENCOUNTER — TELEPHONE (OUTPATIENT)
Dept: CARDIOLOGY CLINIC | Age: 79
End: 2019-04-22

## 2019-04-22 LAB — INR BLD: 1.9

## 2019-04-22 NOTE — TELEPHONE ENCOUNTER
Coumadin/PT/INR    PT: 22.4    INR: 1.9    Where do you have your blood drawn?  (lab/HHRN/Home Monitor) [facility]    When was it drawn? 4/22/19    Why do you take Coumadin/Warfarin? Current medication dosage and instructions? 2 mg     Have you missed any doses? no    Any change in your other medications? Are you taking an antibiotic? Any dietary changes?

## 2019-04-23 RX ORDER — WARFARIN SODIUM 2 MG/1
TABLET ORAL
Qty: 30 TABLET | Refills: 4 | Status: SHIPPED | OUTPATIENT
Start: 2019-04-23 | End: 2019-09-21 | Stop reason: SDUPTHER

## 2019-04-23 NOTE — TELEPHONE ENCOUNTER
Last appt on 12/27/2019  Next appt on 07/09/2019    Last refill on 12/27/2018 30 day with 5 refills    Last INR (yesterday) 04/22/2019

## 2019-04-24 ENCOUNTER — ANTI-COAG VISIT (OUTPATIENT)
Dept: CARDIOLOGY CLINIC | Age: 79
End: 2019-04-24

## 2019-04-24 ENCOUNTER — TELEPHONE (OUTPATIENT)
Dept: CARDIOLOGY CLINIC | Age: 79
End: 2019-04-24

## 2019-04-24 LAB — INR BLD: 2.1

## 2019-04-29 ENCOUNTER — ANTI-COAG VISIT (OUTPATIENT)
Dept: CARDIOLOGY CLINIC | Age: 79
End: 2019-04-29

## 2019-04-29 ENCOUNTER — TELEPHONE (OUTPATIENT)
Dept: CARDIOLOGY CLINIC | Age: 79
End: 2019-04-29

## 2019-04-29 LAB — INR BLD: 1.5

## 2019-04-29 NOTE — TELEPHONE ENCOUNTER
Coumadin/PT/INR    PT:17.4    INR:1.5    Where do you have your blood drawn?  (lab/HHRN/Home Monitor)  [facility]   When was it drawn? 4/29/19  Why do you take Coumadin/Warfarin? Current medication dosage and instructions? 2 mg every day of coumadin  Have you missed any doses? Pt has not been taking as prescribed. 4mg Monday and then 2 mg everyday. Any change in your other medications? no  Are you taking an antibiotic?  no  Any dietary changes?   no

## 2019-05-06 ENCOUNTER — TELEPHONE (OUTPATIENT)
Dept: CARDIOLOGY CLINIC | Age: 79
End: 2019-05-06

## 2019-05-06 ENCOUNTER — ANTI-COAG VISIT (OUTPATIENT)
Dept: CARDIOLOGY CLINIC | Age: 79
End: 2019-05-06

## 2019-05-06 LAB — INR BLD: 1.7

## 2019-05-06 NOTE — TELEPHONE ENCOUNTER
Coumadin/PT/INR    PT: 20.3    INR: 1.7    Where do you have your blood drawn?  (lab/HHRN/Home Monitor) [facility]    When was it drawn? 5/6/19    Why do you take Coumadin/Warfarin? Current medication dosage and instructions? 4 mg mon 2 mg rest of week    Have you missed any doses? no    Any change in your other medications? no    Are you taking an antibiotic? no  Any dietary changes?  no

## 2019-05-06 NOTE — TELEPHONE ENCOUNTER
Pt calling, she was out and saw that someone had called but didn't leave a message. She thinks it was about increasing her warfarin dosage to 4 mg. Pls call to advise. Thank you.

## 2019-05-06 NOTE — PROGRESS NOTES
Spoke to Shawnee Hahn nurse, verified dose and informed to have pt increase Wednesday's dose to a 4mg and recheck next week.

## 2019-05-13 ENCOUNTER — ANTI-COAG VISIT (OUTPATIENT)
Dept: CARDIOLOGY CLINIC | Age: 79
End: 2019-05-13

## 2019-05-13 ENCOUNTER — TELEPHONE (OUTPATIENT)
Dept: CARDIOLOGY CLINIC | Age: 79
End: 2019-05-13

## 2019-05-13 LAB — INR BLD: 3.2

## 2019-05-13 NOTE — TELEPHONE ENCOUNTER
Coumadin/PT/INR    PT:38.4    INR:3.2  Where do you have your blood drawn?  (lab/HHRN/Home Monitor)    When was it drawn? Why do you take Coumadin/Warfarin? Current medication dosage and instructions? 4mg mond and wed , 2mg all other days     Have you missed any doses? no    Any change in your other medications? no    Are you taking an antibiotic?no    Any dietary changes? no

## 2019-05-20 ENCOUNTER — ANTI-COAG VISIT (OUTPATIENT)
Dept: CARDIOLOGY CLINIC | Age: 79
End: 2019-05-20

## 2019-05-20 ENCOUNTER — TELEPHONE (OUTPATIENT)
Dept: CARDIOLOGY CLINIC | Age: 79
End: 2019-05-20

## 2019-05-20 LAB — INR BLD: 4.5

## 2019-05-22 ENCOUNTER — ANTI-COAG VISIT (OUTPATIENT)
Dept: CARDIOLOGY CLINIC | Age: 79
End: 2019-05-22

## 2019-05-22 ENCOUNTER — TELEPHONE (OUTPATIENT)
Dept: CARDIOLOGY CLINIC | Age: 79
End: 2019-05-22

## 2019-05-22 LAB
INR BLD: 1.7
INR BLD: 1.7

## 2019-05-22 NOTE — TELEPHONE ENCOUNTER
Sandra Sales calling with pts INR results:    PT 19.4  INR 1.7    Pt usually takes 4.5mg on Monday, 2 mg all other days but she has been holding it since [employer]

## 2019-05-22 NOTE — TELEPHONE ENCOUNTER
Anticoagulation Summary   As of 2019   INR goal:  2.0-3.0   TTR:  51.5 % (4.3 mo)   INR used for dosin.50! (2019)   Warfarin maintenance plan:  4 mg (2 mg x 2) every Mon, Wed; 2 mg (2 mg x 1) all other days   Weekly warfarin total:  18 mg   Plan last modified:  eMry Leonard (2019)   Next INR check:  2019   Priority:  Critical        Description     [address] [place], [place] [phone]   Pt home number [phone] Mayur Jacobs [phone] to the Mayur Jacobs RN-advised to hold two days, recheck 19.      Please advise

## 2019-05-29 ENCOUNTER — ANTI-COAG VISIT (OUTPATIENT)
Dept: CARDIOLOGY CLINIC | Age: 79
End: 2019-05-29

## 2019-05-29 ENCOUNTER — TELEPHONE (OUTPATIENT)
Dept: CARDIOLOGY CLINIC | Age: 79
End: 2019-05-29

## 2019-05-29 LAB — INR BLD: 1.8

## 2019-06-03 ENCOUNTER — ANTI-COAG VISIT (OUTPATIENT)
Dept: CARDIOLOGY CLINIC | Age: 79
End: 2019-06-03

## 2019-06-03 ENCOUNTER — TELEPHONE (OUTPATIENT)
Dept: CARDIOLOGY CLINIC | Age: 79
End: 2019-06-03

## 2019-06-03 LAB — INR BLD: 1.9

## 2019-06-03 NOTE — TELEPHONE ENCOUNTER
Coumadin/PT/INR    PT:22.4    INR:1.9    Where do you have your blood drawn?   [facility]  (lab/HHRN/Home Monitor)

## 2019-06-10 ENCOUNTER — ANTI-COAG VISIT (OUTPATIENT)
Dept: CARDIOLOGY CLINIC | Age: 79
End: 2019-06-10

## 2019-06-10 ENCOUNTER — TELEPHONE (OUTPATIENT)
Dept: CARDIOLOGY CLINIC | Age: 79
End: 2019-06-10

## 2019-06-10 LAB — INR BLD: 1.7

## 2019-06-10 NOTE — TELEPHONE ENCOUNTER
Coumadin/PT/INR    PT: 20.4    INR: 1.7    Where do you have your blood drawn?    (lab/HHRN/Home Monitor)

## 2019-06-10 NOTE — PROGRESS NOTES
Informed Calvin to have pt increase today's dose to 4 mg and recheck in a week. Sandra Miles stated that he would inform pt , she was at [facility] at the time I called.

## 2019-06-17 ENCOUNTER — ANTI-COAG VISIT (OUTPATIENT)
Dept: CARDIOLOGY CLINIC | Age: 79
End: 2019-06-17

## 2019-06-17 ENCOUNTER — TELEPHONE (OUTPATIENT)
Dept: CARDIOLOGY CLINIC | Age: 79
End: 2019-06-17

## 2019-06-17 LAB — INR BLD: 2

## 2019-06-17 NOTE — TELEPHONE ENCOUNTER
Coumadin/PT/INR    PT:24.3    INR: 2.0    Where do you have your blood drawn?   (lab/HHRN/Home Monitor)  [facility]      2mg daily

## 2019-06-24 ENCOUNTER — TELEPHONE (OUTPATIENT)
Dept: CARDIOLOGY CLINIC | Age: 79
End: 2019-06-24

## 2019-06-24 ENCOUNTER — ANTI-COAG VISIT (OUTPATIENT)
Dept: CARDIOLOGY CLINIC | Age: 79
End: 2019-06-24

## 2019-06-24 LAB — INR BLD: 2.2

## 2019-06-24 NOTE — TELEPHONE ENCOUNTER
Coumadin/PT/INR    PT: 26.9    INR: 2.2    Where do you have your blood drawn?  [facility] 6/24/19    (lab/HHRN/Home Monitor)

## 2019-07-01 ENCOUNTER — TELEPHONE (OUTPATIENT)
Dept: CARDIOLOGY CLINIC | Age: 79
End: 2019-07-01

## 2019-07-01 ENCOUNTER — ANTI-COAG VISIT (OUTPATIENT)
Dept: CARDIOLOGY CLINIC | Age: 79
End: 2019-07-01

## 2019-07-01 LAB — INR BLD: 1.6

## 2019-07-08 ENCOUNTER — TELEPHONE (OUTPATIENT)
Dept: CARDIOLOGY CLINIC | Age: 79
End: 2019-07-08

## 2019-07-08 ENCOUNTER — ANTI-COAG VISIT (OUTPATIENT)
Dept: CARDIOLOGY CLINIC | Age: 79
End: 2019-07-08

## 2019-07-15 ENCOUNTER — TELEPHONE (OUTPATIENT)
Dept: CARDIOLOGY CLINIC | Age: 79
End: 2019-07-15

## 2019-07-15 ENCOUNTER — ANTI-COAG VISIT (OUTPATIENT)
Dept: CARDIOLOGY CLINIC | Age: 79
End: 2019-07-15

## 2019-07-15 LAB — INR BLD: 1.8

## 2019-07-22 ENCOUNTER — ANTI-COAG VISIT (OUTPATIENT)
Dept: CARDIOLOGY CLINIC | Age: 79
End: 2019-07-22

## 2019-07-22 ENCOUNTER — TELEPHONE (OUTPATIENT)
Dept: CARDIOLOGY CLINIC | Age: 79
End: 2019-07-22

## 2019-07-22 LAB — INR BLD: 1.8

## 2019-07-29 ENCOUNTER — ANTI-COAG VISIT (OUTPATIENT)
Dept: CARDIOLOGY CLINIC | Age: 79
End: 2019-07-29

## 2019-07-29 ENCOUNTER — TELEPHONE (OUTPATIENT)
Dept: CARDIOLOGY CLINIC | Age: 79
End: 2019-07-29

## 2019-07-29 LAB — INR BLD: 2

## 2019-08-05 ENCOUNTER — ANTI-COAG VISIT (OUTPATIENT)
Dept: CARDIOLOGY CLINIC | Age: 79
End: 2019-08-05

## 2019-08-05 ENCOUNTER — TELEPHONE (OUTPATIENT)
Dept: CARDIOLOGY CLINIC | Age: 79
End: 2019-08-05

## 2019-08-05 LAB — INR BLD: 3

## 2019-08-06 ENCOUNTER — OFFICE VISIT (OUTPATIENT)
Dept: CARDIOLOGY CLINIC | Age: 79
End: 2019-08-06
Payer: MEDICARE

## 2019-08-06 VITALS
OXYGEN SATURATION: 94 % | HEIGHT: 62 IN | DIASTOLIC BLOOD PRESSURE: 70 MMHG | WEIGHT: 222 LBS | SYSTOLIC BLOOD PRESSURE: 148 MMHG | HEART RATE: 63 BPM | BODY MASS INDEX: 40.85 KG/M2

## 2019-08-06 DIAGNOSIS — I65.23 BILATERAL CAROTID ARTERY STENOSIS: ICD-10-CM

## 2019-08-06 DIAGNOSIS — I48.0 PAROXYSMAL ATRIAL FIBRILLATION (HCC): ICD-10-CM

## 2019-08-06 DIAGNOSIS — I10 ESSENTIAL HYPERTENSION: ICD-10-CM

## 2019-08-06 DIAGNOSIS — I25.10 ATHEROSCLEROSIS OF NATIVE CORONARY ARTERY OF NATIVE HEART WITHOUT ANGINA PECTORIS: Primary | ICD-10-CM

## 2019-08-06 DIAGNOSIS — E78.2 MIXED HYPERLIPIDEMIA: ICD-10-CM

## 2019-08-06 DIAGNOSIS — I38 VALVULAR REGURGITATION: ICD-10-CM

## 2019-08-06 PROCEDURE — 1090F PRES/ABSN URINE INCON ASSESS: CPT | Performed by: NURSE PRACTITIONER

## 2019-08-06 PROCEDURE — G8427 DOCREV CUR MEDS BY ELIG CLIN: HCPCS | Performed by: NURSE PRACTITIONER

## 2019-08-06 PROCEDURE — 1036F TOBACCO NON-USER: CPT | Performed by: NURSE PRACTITIONER

## 2019-08-06 PROCEDURE — 4040F PNEUMOC VAC/ADMIN/RCVD: CPT | Performed by: NURSE PRACTITIONER

## 2019-08-06 PROCEDURE — G8417 CALC BMI ABV UP PARAM F/U: HCPCS | Performed by: NURSE PRACTITIONER

## 2019-08-06 PROCEDURE — G8400 PT W/DXA NO RESULTS DOC: HCPCS | Performed by: NURSE PRACTITIONER

## 2019-08-06 PROCEDURE — 1123F ACP DISCUSS/DSCN MKR DOCD: CPT | Performed by: NURSE PRACTITIONER

## 2019-08-06 PROCEDURE — 93000 ELECTROCARDIOGRAM COMPLETE: CPT | Performed by: NURSE PRACTITIONER

## 2019-08-06 PROCEDURE — 99214 OFFICE O/P EST MOD 30 MIN: CPT | Performed by: NURSE PRACTITIONER

## 2019-08-06 PROCEDURE — G8598 ASA/ANTIPLAT THER USED: HCPCS | Performed by: NURSE PRACTITIONER

## 2019-08-06 NOTE — PROGRESS NOTES
route daily 1 each 0    insulin lispro (HUMALOG) 100 UNIT/ML injection vial Inject 15 Units into the skin every morning (before breakfast) sliding scale      cinacalcet (SENSIPAR) 30 MG tablet Take 30 mg by mouth every other day       omeprazole (PRILOSEC) 20 MG delayed release capsule Take 20 mg by mouth daily      ACCU-CHEK COMPACT PLUS strip       LANTUS 100 UNIT/ML injection vial Inject 20 Units into the skin See Admin Instructions Holds for a BS < 100, does a sliding scale with Lantus      COMFORT ASSIST INSULIN SYRINGE 30G X 5/16\" 0.5 ML MISC       lidocaine-prilocaine (EMLA) 2.5-2.5 % cream       RENVELA 800 MG tablet Take 1 tablet by mouth 3 times daily       Cholecalciferol (VITAMIN D PO) Take 50,000 Units by mouth every 14 days       levothyroxine (SYNTHROID) 25 MCG tablet Take 25 mcg by mouth Daily.  rosuvastatin (CRESTOR) 20 MG tablet Take 20 mg by mouth daily.  carvedilol (COREG) 12.5 MG tablet Take 12.5 mg by mouth 2 times daily (with meals) On dialysis days she only takes once daily      fish oil-omega-3 fatty acids 1000 MG capsule Take 1 g by mouth 3 times daily.  docusate sodium (COLACE) 100 MG capsule Take 100 mg by mouth daily. No current facility-administered medications for this visit. REVIEW OF SYSTEMS:    CONSTITUTIONAL: No major weight gain or loss, fatigue, weakness, night sweats or fever. HEENT: No new vision difficulties or ringing in the ears. RESPIRATORY: No new SOB, PND, orthopnea or cough. CARDIOVASCULAR: See HPI  GI: No nausea, vomiting, + diarrhea, - constipation, abdominal pain or changes in bowel habits. : No urinary frequency, urgency, incontinence hematuria or dysuria. SKIN: No cyanosis or skin lesions. MUSCULOSKELETAL: No new muscle or joint pain. NEUROLOGICAL: No syncope or TIA-like symptoms.   PSYCHIATRIC: No anxiety, pain, insomnia or depression    Objective:   PHYSICAL EXAM:        Vitals:    08/06/19 0934 08/06/19 0953   BP: angina  ~s/p CABG  ~small reversible defect on stress from '14  ~BB / statin     2. Paroxysmal atrial fibrillation (HCC)   ~offers no c/o palpitations  ~occurred during episode of volume overload  ~regular to auscultation   ~carvedilol / AC  ~warfarin managed in coumadin clinic  ~LA 4.4 cm on echo EKG 12 lead   3. Essential hypertension   ~controlled     4. Valvular regurgitation   ~mod MR, mod-severe TR on echo from Dec '18 Echo 2D w doppler w color complete   5. Bilateral carotid artery stenosis   ~michelle ICA 50-79% by US  ~denies stroke-like symptoms   ~statin  US CAROTID ARTERY BILATERAL   6. Mixed hyperlipidemia   ~crestor  ~lipid profile unavailable for review        I had the opportunity to review the clinical symptoms and presentation of Debbie Miranda. Plan:     1. EKG : sinus rhythm 64  2. Echocardiogram to reassess her valves in Dec  3. Carotid US as routine  4. F/U in six months   5. Stop colace; consider using probiotic for ATX induced diarrhea (encouraged to call dentist)  6. Will have lipid profile done with dialysis labs    Overall the patient is stable from CV standpoint    I have addresed the patient's cardiac risk factors and adjusted pharmacologic treatment as needed. In addition, I have reinforced the need for patient directed risk factor modification. Further evaluation will be based upon the patient's clinical course and testing results. All questions and concerns were addressed to the patient. Alternatives to my treatment were discussed. The patient is not currently smoking. The risks related to smoking were reviewed with the patient. Recommend maintaining a smoke-free lifestyle. Patient is on a beta-blocker  Patient is not on an ace-i/ARB : CKD / ESRD  Patient is on a statin     anti-coagulation has been recommended / prescribed for this patient. Education conducted on adverse reactions including bleeding was discussed.     The patient verbalizes understanding not to stop medications

## 2019-08-07 ENCOUNTER — TELEPHONE (OUTPATIENT)
Dept: CARDIOLOGY CLINIC | Age: 79
End: 2019-08-07

## 2019-08-07 ENCOUNTER — ANTI-COAG VISIT (OUTPATIENT)
Dept: CARDIOLOGY CLINIC | Age: 79
End: 2019-08-07

## 2019-08-07 LAB — INR BLD: 2.8

## 2019-08-12 ENCOUNTER — TELEPHONE (OUTPATIENT)
Dept: CARDIOLOGY CLINIC | Age: 79
End: 2019-08-12

## 2019-08-12 ENCOUNTER — ANTI-COAG VISIT (OUTPATIENT)
Dept: CARDIOLOGY CLINIC | Age: 79
End: 2019-08-12

## 2019-08-12 LAB — INR BLD: 2.8

## 2019-08-19 ENCOUNTER — TELEPHONE (OUTPATIENT)
Dept: CARDIOLOGY CLINIC | Age: 79
End: 2019-08-19

## 2019-08-19 ENCOUNTER — ANTI-COAG VISIT (OUTPATIENT)
Dept: CARDIOLOGY CLINIC | Age: 79
End: 2019-08-19

## 2019-08-19 LAB — INR BLD: 1.8

## 2019-08-19 NOTE — TELEPHONE ENCOUNTER
Coumadin/PT/INR    PT: 21.5    INR: 1.8      How are you taking the medication and what mg? 4 mg Mon/ 2 mg on other days    She missed a 2 mg dose on Thurs. 15th    Person calling in results: Scot Situ    Call back number? [phone]    Where do you have your blood drawn?    [facility]  (lab/HHRN/Home Monitor)

## 2019-09-04 ENCOUNTER — ANTI-COAG VISIT (OUTPATIENT)
Dept: CARDIOLOGY CLINIC | Age: 79
End: 2019-09-04

## 2019-09-04 LAB — INR BLD: 2.4

## 2019-09-16 LAB — INR BLD: 2.3

## 2019-09-17 ENCOUNTER — ANTI-COAG VISIT (OUTPATIENT)
Dept: CARDIOLOGY CLINIC | Age: 79
End: 2019-09-17

## 2019-09-23 RX ORDER — WARFARIN SODIUM 2 MG/1
TABLET ORAL
Qty: 60 TABLET | Refills: 3 | Status: SHIPPED | OUTPATIENT
Start: 2019-09-23 | End: 2020-03-16 | Stop reason: SDUPTHER

## 2019-09-23 NOTE — TELEPHONE ENCOUNTER
Was put on medication on 12/28/2018 DR DIXON    Last follow up appt was on 08/06/2019 NPTS  Next appt on 02/06/2020    Last anti coag 09/17/2019

## 2019-09-30 ENCOUNTER — ANTI-COAG VISIT (OUTPATIENT)
Dept: CARDIOLOGY CLINIC | Age: 79
End: 2019-09-30

## 2019-09-30 LAB — INR BLD: 2.2

## 2019-10-09 ENCOUNTER — ANTI-COAG VISIT (OUTPATIENT)
Dept: CARDIOLOGY CLINIC | Age: 79
End: 2019-10-09

## 2019-10-09 LAB — INR BLD: 1.8

## 2019-10-14 ENCOUNTER — ANTI-COAG VISIT (OUTPATIENT)
Dept: CARDIOLOGY CLINIC | Age: 79
End: 2019-10-14

## 2019-10-14 LAB — INR BLD: 1.6

## 2019-10-18 ENCOUNTER — ANTI-COAG VISIT (OUTPATIENT)
Dept: CARDIOLOGY CLINIC | Age: 79
End: 2019-10-18

## 2019-10-18 LAB — INR BLD: 1.8

## 2019-10-28 ENCOUNTER — ANTI-COAG VISIT (OUTPATIENT)
Dept: CARDIOLOGY CLINIC | Age: 79
End: 2019-10-28

## 2019-10-28 LAB — INR BLD: 2.6

## 2019-11-04 ENCOUNTER — ANTI-COAG VISIT (OUTPATIENT)
Dept: CARDIOLOGY CLINIC | Age: 79
End: 2019-11-04

## 2019-11-04 LAB — INR BLD: 1.9

## 2019-11-11 ENCOUNTER — ANTI-COAG VISIT (OUTPATIENT)
Dept: CARDIOLOGY CLINIC | Age: 79
End: 2019-11-11

## 2019-11-11 LAB
INR BLD: 1.8
INR BLD: 1.8

## 2019-11-18 ENCOUNTER — ANTI-COAG VISIT (OUTPATIENT)
Dept: CARDIOLOGY CLINIC | Age: 79
End: 2019-11-18

## 2019-11-18 LAB — INR BLD: 1.4

## 2019-11-25 ENCOUNTER — TELEPHONE (OUTPATIENT)
Dept: CARDIOLOGY CLINIC | Age: 79
End: 2019-11-25

## 2019-11-25 ENCOUNTER — ANTI-COAG VISIT (OUTPATIENT)
Dept: CARDIOLOGY CLINIC | Age: 79
End: 2019-11-25

## 2019-11-25 LAB — INR BLD: 2

## 2019-12-02 ENCOUNTER — ANTI-COAG VISIT (OUTPATIENT)
Dept: CARDIOLOGY CLINIC | Age: 79
End: 2019-12-02

## 2019-12-02 LAB — INR BLD: 2

## 2019-12-09 ENCOUNTER — ANTI-COAG VISIT (OUTPATIENT)
Dept: CARDIOLOGY CLINIC | Age: 79
End: 2019-12-09

## 2019-12-09 LAB — INR BLD: 2.2

## 2019-12-16 ENCOUNTER — ANTI-COAG VISIT (OUTPATIENT)
Dept: CARDIOLOGY CLINIC | Age: 79
End: 2019-12-16

## 2019-12-16 LAB — INR BLD: 1.4

## 2019-12-22 LAB — INR BLD: 2.1

## 2019-12-23 ENCOUNTER — ANTI-COAG VISIT (OUTPATIENT)
Dept: CARDIOLOGY CLINIC | Age: 79
End: 2019-12-23

## 2019-12-29 LAB — INR BLD: 3

## 2019-12-30 ENCOUNTER — ANTI-COAG VISIT (OUTPATIENT)
Dept: CARDIOLOGY CLINIC | Age: 79
End: 2019-12-30

## 2020-01-01 ENCOUNTER — ANTI-COAG VISIT (OUTPATIENT)
Dept: CARDIOLOGY CLINIC | Age: 80
End: 2020-01-01
Payer: MEDICARE

## 2020-01-01 ENCOUNTER — ANTI-COAG VISIT (OUTPATIENT)
Dept: CARDIOLOGY CLINIC | Age: 80
End: 2020-01-01

## 2020-01-01 ENCOUNTER — TELEPHONE (OUTPATIENT)
Dept: CARDIOLOGY CLINIC | Age: 80
End: 2020-01-01

## 2020-01-01 ENCOUNTER — OFFICE VISIT (OUTPATIENT)
Dept: CARDIOLOGY CLINIC | Age: 80
End: 2020-01-01
Payer: MEDICARE

## 2020-01-01 ENCOUNTER — TELEPHONE (OUTPATIENT)
Dept: CARDIOLOGY | Age: 80
End: 2020-01-01

## 2020-01-01 ENCOUNTER — HOSPITAL ENCOUNTER (OUTPATIENT)
Dept: NON INVASIVE DIAGNOSTICS | Age: 80
Discharge: HOME | End: 2020-06-16
Payer: MEDICARE

## 2020-01-01 VITALS
BODY MASS INDEX: 39.75 KG/M2 | WEIGHT: 216 LBS | OXYGEN SATURATION: 98 % | HEART RATE: 58 BPM | HEIGHT: 62 IN | DIASTOLIC BLOOD PRESSURE: 62 MMHG | SYSTOLIC BLOOD PRESSURE: 132 MMHG

## 2020-01-01 LAB
INR BLD: 1.3
INR BLD: 1.4
INR BLD: 1.5
INR BLD: 1.8
INR BLD: 1.8
INR BLD: 1.9
INR BLD: 2.1
INR BLD: 2.1
INR BLD: 2.2
INR BLD: 2.3
INR BLD: 2.3
INR BLD: 2.7
INR BLD: 2.7
INR BLD: 2.8
INR BLD: 2.9
INR BLD: 3.1
INR BLD: 3.2
INR BLD: 3.2
INR BLD: 3.3
INR BLD: 4
LV EF: 58 %
LVEF MODALITY: NORMAL

## 2020-01-01 PROCEDURE — 93793 ANTICOAG MGMT PT WARFARIN: CPT | Performed by: NURSE PRACTITIONER

## 2020-01-01 PROCEDURE — 1090F PRES/ABSN URINE INCON ASSESS: CPT | Performed by: NURSE PRACTITIONER

## 2020-01-01 PROCEDURE — 99214 OFFICE O/P EST MOD 30 MIN: CPT | Performed by: NURSE PRACTITIONER

## 2020-01-01 PROCEDURE — 4040F PNEUMOC VAC/ADMIN/RCVD: CPT | Performed by: NURSE PRACTITIONER

## 2020-01-01 PROCEDURE — 1036F TOBACCO NON-USER: CPT | Performed by: NURSE PRACTITIONER

## 2020-01-01 PROCEDURE — G8417 CALC BMI ABV UP PARAM F/U: HCPCS | Performed by: NURSE PRACTITIONER

## 2020-01-01 PROCEDURE — G8400 PT W/DXA NO RESULTS DOC: HCPCS | Performed by: NURSE PRACTITIONER

## 2020-01-01 PROCEDURE — 1123F ACP DISCUSS/DSCN MKR DOCD: CPT | Performed by: NURSE PRACTITIONER

## 2020-01-01 PROCEDURE — 93306 TTE W/DOPPLER COMPLETE: CPT

## 2020-01-01 PROCEDURE — G8427 DOCREV CUR MEDS BY ELIG CLIN: HCPCS | Performed by: NURSE PRACTITIONER

## 2020-01-01 RX ORDER — AMLODIPINE BESYLATE 5 MG/1
5 TABLET ORAL DAILY
COMMUNITY
End: 2021-01-01 | Stop reason: ALTCHOICE

## 2020-01-01 RX ORDER — WARFARIN SODIUM 2 MG/1
TABLET ORAL
Qty: 60 TABLET | Refills: 0 | Status: SHIPPED | OUTPATIENT
Start: 2020-01-01 | End: 2020-01-01

## 2020-01-01 RX ORDER — WARFARIN SODIUM 2 MG/1
TABLET ORAL
Qty: 60 TABLET | Refills: 0 | Status: SHIPPED | OUTPATIENT
Start: 2020-01-01 | End: 2021-01-01

## 2020-01-01 RX ORDER — MIDODRINE HYDROCHLORIDE 10 MG/1
10-20 TABLET ORAL 3 TIMES DAILY
Status: ON HOLD | COMMUNITY
End: 2021-01-01 | Stop reason: HOSPADM

## 2020-01-06 ENCOUNTER — ANTI-COAG VISIT (OUTPATIENT)
Dept: CARDIOLOGY CLINIC | Age: 80
End: 2020-01-06

## 2020-01-06 LAB — INR BLD: 1.8

## 2020-01-13 LAB — INR BLD: 1.7

## 2020-01-14 ENCOUNTER — ANTI-COAG VISIT (OUTPATIENT)
Dept: CARDIOLOGY CLINIC | Age: 80
End: 2020-01-14

## 2020-01-20 ENCOUNTER — ANTI-COAG VISIT (OUTPATIENT)
Dept: CARDIOLOGY CLINIC | Age: 80
End: 2020-01-20

## 2020-01-20 LAB — INR BLD: 2.4

## 2020-01-27 ENCOUNTER — ANTI-COAG VISIT (OUTPATIENT)
Dept: CARDIOLOGY CLINIC | Age: 80
End: 2020-01-27

## 2020-01-27 LAB — INR BLD: 2.4

## 2020-02-03 ENCOUNTER — ANTI-COAG VISIT (OUTPATIENT)
Dept: CARDIOLOGY CLINIC | Age: 80
End: 2020-02-03

## 2020-02-03 LAB — INR BLD: 2.2

## 2020-02-10 ENCOUNTER — ANTI-COAG VISIT (OUTPATIENT)
Dept: CARDIOLOGY CLINIC | Age: 80
End: 2020-02-10

## 2020-02-10 LAB — INR BLD: 2

## 2020-02-24 ENCOUNTER — ANTI-COAG VISIT (OUTPATIENT)
Dept: CARDIOLOGY CLINIC | Age: 80
End: 2020-02-24

## 2020-02-24 LAB — INR BLD: 1.2

## 2020-02-24 NOTE — PROGRESS NOTES
I spoke with MILTON about this pt. We agreed to increase Mon and Wed dose to 6mg. If these changes get pt in range we will change schedule next week to 4 mg all days except 2 mg one day of her choosing.

## 2020-02-26 ENCOUNTER — TELEPHONE (OUTPATIENT)
Dept: CARDIOLOGY CLINIC | Age: 80
End: 2020-02-26

## 2020-02-26 NOTE — TELEPHONE ENCOUNTER
Was informed to called Eli with [facility] and have the patient stay on the same dosing schedule and recheck on Monday.

## 2020-02-26 NOTE — TELEPHONE ENCOUNTER
Coumadin/PT/INR    PT:    INR: 1.2      What dosage do you take daily? Person calling in results: [facility]    Call back number? [phone]    Where do you have your blood drawn?    (lab/HHRN/Home Monitor)    ERASMO Renteria pt advised she wasn't called to let her know of any dosage changes therefore she didn't take her dosage this past Friday, Saturday or Sunday. Please call Eli to advise if she should continue with the same dose or provide new instructions. Thank you.

## 2020-03-02 LAB — INR BLD: 3

## 2020-03-03 ENCOUNTER — ANTI-COAG VISIT (OUTPATIENT)
Dept: CARDIOLOGY CLINIC | Age: 80
End: 2020-03-03

## 2020-03-16 ENCOUNTER — ANTI-COAG VISIT (OUTPATIENT)
Dept: CARDIOLOGY CLINIC | Age: 80
End: 2020-03-16

## 2020-03-16 LAB
INR BLD: 1.3
INR BLD: 1.4

## 2020-03-16 NOTE — PROGRESS NOTES
Spoke with patient informed her to take 4 mg every day and recheck in 2 weeks per DR. Aaron Hernandez

## 2020-03-18 ENCOUNTER — TELEPHONE (OUTPATIENT)
Dept: CARDIOLOGY CLINIC | Age: 80
End: 2020-03-18

## 2020-03-18 RX ORDER — WARFARIN SODIUM 2 MG/1
TABLET ORAL
Qty: 180 TABLET | Refills: 0 | Status: SHIPPED | OUTPATIENT
Start: 2020-03-18 | End: 2020-05-07

## 2020-03-23 ENCOUNTER — ANTI-COAG VISIT (OUTPATIENT)
Dept: CARDIOLOGY CLINIC | Age: 80
End: 2020-03-23

## 2020-03-23 LAB — INR BLD: 3

## 2020-04-06 ENCOUNTER — TELEPHONE (OUTPATIENT)
Dept: CARDIOLOGY CLINIC | Age: 80
End: 2020-04-06

## 2020-04-06 ENCOUNTER — ANTI-COAG VISIT (OUTPATIENT)
Dept: CARDIOLOGY CLINIC | Age: 80
End: 2020-04-06

## 2020-04-06 LAB — INR BLD: 1.3

## 2020-04-10 ENCOUNTER — ANTI-COAG VISIT (OUTPATIENT)
Dept: CARDIOLOGY CLINIC | Age: 80
End: 2020-04-10

## 2020-04-10 LAB — INR BLD: 3

## 2020-04-13 ENCOUNTER — ANTI-COAG VISIT (OUTPATIENT)
Dept: CARDIOLOGY CLINIC | Age: 80
End: 2020-04-13

## 2020-04-13 LAB — INR BLD: 2.5

## 2020-04-20 ENCOUNTER — ANTI-COAG VISIT (OUTPATIENT)
Dept: CARDIOLOGY CLINIC | Age: 80
End: 2020-04-20

## 2020-04-20 LAB — INR BLD: 2.1

## 2020-04-20 NOTE — PROGRESS NOTES
Spoke to the nurse at [facility] and told her that the pt it to stay on the same dose and recheck in 1 week. She stated that it nothing needs changed then we don't have to call.

## 2020-04-27 LAB — INR BLD: 3.3

## 2020-04-30 ENCOUNTER — ANTI-COAG VISIT (OUTPATIENT)
Dept: CARDIOLOGY CLINIC | Age: 80
End: 2020-04-30
Payer: MEDICARE

## 2020-04-30 LAB — INR BLD: 3.3

## 2020-04-30 PROCEDURE — 93793 ANTICOAG MGMT PT WARFARIN: CPT | Performed by: NURSE PRACTITIONER

## 2020-05-04 ENCOUNTER — ANTI-COAG VISIT (OUTPATIENT)
Dept: CARDIOLOGY CLINIC | Age: 80
End: 2020-05-04
Payer: MEDICARE

## 2020-05-04 LAB — INR BLD: 2.2

## 2020-05-04 PROCEDURE — 93793 ANTICOAG MGMT PT WARFARIN: CPT | Performed by: NURSE PRACTITIONER

## 2020-05-05 NOTE — PROGRESS NOTES
ANTICOAGULATION MONITORING    Josefa Stephenson, 1940      Anticoagulation Indication(s):  Afib    Referring Physician:   Dr. Ortiz Second  Goal INR Range:  2-3    Duration of Anticoagulation Therapy: definite  Home or Lab Draw: Y[facility] Pulliam  Product patient has at home: warfarin 2 mg    Recent INR Results:  Lab Results   Component Value Date    INR 2.20 05/04/2020    INR 3.30 04/30/2020    INR 3.30 04/27/2020    INR 2.10 04/20/2020       INR Summary                          Warfarin regimen (mg)  Date INR A/P Sun Mon Tue Wed Thu Fri Sat Mg/wk   4/30/20 3.3 At goal, continue 4 4 6 4 4 4 4 30   5/4/20 2.2 At goal 4 4 6 4 4 4 4 30                                   Assessment/Plan:    Recent hospitalizations/HC visits None reported   Recent medication changes None reported   Medications taken regularly that may interact with warfarin or alter INR No significant drug interactions identified   Warfarin dose taken as prescribed Yes   Signs/symptoms of bleeding History of bleeding? No   Vitamin K intake Consistency of servings of green, leafy vegetables per week ? Yes   Recent vomiting/diarrhea/fever None reported   Changes in weight, activity, stress None reported   alcohol use Patient reports having 0 drinks per day   Upcoming surgeries or procedures None reported     Patient's INR was in of range today(but not enough to change dosing per algorithm) so Yoko Pulliam  was instructed to continue current regimen as above   Patient was also reminded to maintain consistent vitamin K intake and call with any bleeding, medication changes, or fever/vomiting/diarrhea.     Next INR check:  1 week    Maru More, APRN-CNP

## 2020-05-07 RX ORDER — WARFARIN SODIUM 2 MG/1
TABLET ORAL
Qty: 180 TABLET | Refills: 0 | Status: SHIPPED | OUTPATIENT
Start: 2020-05-07 | End: 2020-01-01

## 2020-05-13 ENCOUNTER — ANTI-COAG VISIT (OUTPATIENT)
Dept: CARDIOLOGY CLINIC | Age: 80
End: 2020-05-13

## 2020-05-13 LAB — INR BLD: 2.5

## 2020-05-18 ENCOUNTER — ANTI-COAG VISIT (OUTPATIENT)
Dept: CARDIOLOGY CLINIC | Age: 80
End: 2020-05-18
Payer: MEDICARE

## 2020-05-18 LAB — INR BLD: 2.2

## 2020-05-18 PROCEDURE — 93793 ANTICOAG MGMT PT WARFARIN: CPT | Performed by: NURSE PRACTITIONER

## 2020-05-18 NOTE — PROGRESS NOTES
ANTICOAGULATION MONITORING    Jes Jon, 1940      Anticoagulation Indication(s):  Afib    Referring Physician:   Dr. Devora Girard  Goal INR Range:  2-3    Duration of Anticoagulation Therapy: definite  Home or Lab Draw: [facility]  Product patient has at home: warfarin 2 mg    Recent INR Results:  Lab Results   Component Value Date    INR 2.20 05/18/2020    INR 2.50 05/13/2020    INR 2.20 05/04/2020    INR 3.30 04/30/2020       INR Summary                          Warfarin regimen (mg)  Date INR A/P Sun Mon Tue Wed Thu Fri Sat Mg/wk   4/30/20 3.3 At goal, continue 4 4 6 4 4 4 4 30   5/4/20 2.2 At goal 4 4 6 4 4 4 4 30   5/18/20 2.2 At goal, continue 5 5 5 5 5 5 5 35                      Assessment/Plan:    Recent hospitalizations/HC visits None reported   Recent medication changes None reported   Medications taken regularly that may interact with warfarin or alter INR No significant drug interactions identified   Warfarin dose taken as prescribed Yes   Signs/symptoms of bleeding History of bleeding? No   Vitamin K intake Consistency of servings of green, leafy vegetables per week ? Yes   Recent vomiting/diarrhea/fever None reported   Changes in weight, activity, stress None reported   alcohol use Patient reports having 0 drinks per day   Upcoming surgeries or procedures None reported     Patient's INR was in range. Continue current dosing    Patient was also reminded to maintain consistent vitamin K intake and call with any bleeding, medication changes, or fever/vomiting/diarrhea. Next INR check:  1 week (5/27/20)    Addendum:  5/20/20  Reviewed RN assessment and plan. INR is within goal, continue current dose   Repeat INR in one week/s. Patient/family instructed by BELLE.      Ryan Mojica, CNP

## 2020-06-01 ENCOUNTER — ANTI-COAG VISIT (OUTPATIENT)
Dept: CARDIOLOGY CLINIC | Age: 80
End: 2020-06-01
Payer: MEDICARE

## 2020-06-01 LAB — INR BLD: 1.7

## 2020-06-01 PROCEDURE — 93793 ANTICOAG MGMT PT WARFARIN: CPT | Performed by: NURSE PRACTITIONER

## 2020-06-02 ENCOUNTER — TELEPHONE (OUTPATIENT)
Dept: CARDIOLOGY CLINIC | Age: 80
End: 2020-06-02

## 2020-06-08 ENCOUNTER — ANTI-COAG VISIT (OUTPATIENT)
Dept: CARDIOLOGY CLINIC | Age: 80
End: 2020-06-08
Payer: MEDICARE

## 2020-06-08 LAB — INR BLD: 2.3

## 2020-06-08 PROCEDURE — 93793 ANTICOAG MGMT PT WARFARIN: CPT | Performed by: NURSE PRACTITIONER

## 2020-06-08 NOTE — PROGRESS NOTES
ANTICOAGULATION MONITORING    Angelina Snowden, 1940      Anticoagulation Indication(s):  Afib    Referring Physician:   Dr. Afua Posada  Goal INR Range:  2-3    Duration of Anticoagulation Therapy: definite  Home or Lab Draw: [email or web address]  Product patient has at home: warfarin 2 mg    Recent INR Results:  Lab Results   Component Value Date    INR 2.30 06/08/2020    INR 1.70 06/01/2020    INR 2.20 05/18/2020    INR 2.50 05/13/2020       INR Summary                          Warfarin regimen (mg)  Date INR A/P Sun Mon Tue Wed Thu Fri Sat Mg/wk                                                                                                                                       6/8/20 2.3 At goal 5 5 5 5 5 5 5 37.5   6/01/20 1.70 Not at goal  5 7.5 5 5 5 5 5 37.5   5/18/20 2.2 At goal 5 5 5 5 5 5 5 35   5/04/20 2.2 At goal, continue 4 4 6 4 4 4 4 30   4/30/20 3.3 At goal 4 4 6 4 4 4 4 30         Assessment/Plan:    Recent hospitalizations/HC visits None reported : yes 5/31/20: cellulitis tx with ATX   Recent medication changes None reported   Medications taken regularly that may interact with warfarin or alter INR No significant drug interactions identified   Warfarin dose taken as prescribed Yes   Signs/symptoms of bleeding History of bleeding? No   Vitamin K intake Consistency of servings of green, leafy vegetables per week ? Yes   Recent vomiting/diarrhea/fever None reported   Changes in weight, activity, stress None reported   alcohol use Patient reports having 0 drinks per day   Upcoming surgeries or procedures None reported     Patient's INR was  in range. Per NPTS 5 every day check on Thurs d/t current antibiotic therapy  Patient was also reminded to maintain consistent vitamin K intake and call with any bleeding, medication changes, or fever/vomiting/diarrhea. Next INR check: 6/11/20    Addendum:  6/8/20  INR is within goal, continue current dose   Repeat INR in one week/s.

## 2020-06-16 NOTE — PROGRESS NOTES
[address]     Outpatient Follow Up Note    Annemarie Kay is 78 y.o. female who presents today with a history of CAD s/p CABG, TR, HTN, PAF Dec '18 after an episode of volume overload (missing dialysis tx) and hyperlipidemia. Her other history includes carotid stenosis and ESRD followed by Dr. Jerrlyn Romberg. CHIEF COMPLAINT / HPI:  Follow Up secondary to coronary artery disease      Subjective:   She developed cellulitis in her LUE / fistula 5/30/20. It swelled, got hot and became painful. She dialyzes via Rt chest wall cath while her arm heals. She remains on ATX    she denies significant chest pain. There is no SOB/CRUZ unless she walks a distance. The patient denies orthopnea/PND. The patient does not have swelling. She urinates some, not a lot, daily. Her dry wt is 97kg. She gains over the weekend ~ 2-3 kg. The patient is not experiencing palpitations or dizziness. These symptoms show no change since the last OV. Her BP has been running low, < 100/ . She takes 10 mg on dialysis days and hold carvedilol until after she gets back home. With regard to medication therapy the patient has been compliant with prescribed regimen. They have tolerated therapy to date. Past Medical History:   Diagnosis Date    Arthritis     Blood circulation, collateral     CAD (coronary artery disease)     CHF (congestive heart failure) (HCC)     Chronic kidney disease     Diabetes mellitus ([facility] [address]     Hemodialysis patient ([facility]     Mon. Wed. Fri.     Hypercholesteremia     Hypertension     Other disorders of kidney and ureter     Pneumonia     pneumonia    Thyroid disease      Social History:    Social History     Tobacco Use   Smoking Status Never Smoker   Smokeless Tobacco Never Used     Current Medications:  Current Outpatient Medications   Medication Sig Dispense Refill    amLODIPine (NORVASC) 5 MG tablet Take 5 mg by mouth daily      midodrine (PROAMATINE) 10 MG tablet Take 10 mg by mouth 3 times daily 2.30 06/08/2020    INR 1.70 06/01/2020    PROTIME 12.3 12/10/2018    PROTIME 11.5 11/30/2016    PROTIME 10.1 12/18/2014     LABS: 5/30/20:   WBC 10.6 H/H 12.2. / 37.0   Na+ 137 K+ 4.4 chl 97 CO2 30 BUN 20 creatinine 4.06 glu 104 GFR 9     Radiology Review:  Pertinent images / reports were reviewed as a part of this visit and reveals the following:    Echo: 6/16/20:   Summary   -Overall left ventricular function is normal.   -Ejection fraction is visually estimated to be 55-60 %. E/e'= 20.05 cm.   -Grade II diastolic dysfunction with elevated LV filling pressures. -Mitral annular calcification is present.   -The mitral valve leaflets appear myxomatous. -Moderate mitral regurgitation.   -Dilated left atrium with a volume of 79.6 ml.   -There is moderate fibrocalcific sclerosis of the aortic valve with evidence   of mild aortic stenosis with mean gradient across the aortic valve of 13   mmHg and calculated aortic valve area was 1.64 square centimeters, all   suggesting mild aortic stenosis. -Moderate tricuspid regurgitation. Estimated pulmonary artery systolic   pressure is mildly to moderately elevated at 65 mmHg assuming a right atrial   pressure of 3 mmHg. Echo: Dec '18:  Summary   -Normal left ventricle size and systolic function with an estimated ejection   fraction of 55-60%.  -No regional wall motion abnormalities are seen.   -There is moderate concentric left ventricular hypertrophy.   -Grade II diastolic dysfunction with elevated LV filling   pressures. E/e\"=24.8.   -Mitral annular calcification is present.   -Moderate mitral regurgitation.   -Mild aortic stenosis .   -Trivial pulmonic regurgitation present.   -Moderate to severe tricuspid regurgitation with RVSP of 65 mmHg.   -The right ventricle is moderately enlarged.   -Right ventricular systolic function is moderately reduced .   -The right atrium is moderately dilated.     Last Stress Test: Nov '14:  Summary    Small sized inferior completely

## 2020-06-24 NOTE — TELEPHONE ENCOUNTER
[facility] calling pt had to hold Coumadin on Mon 06/22 for Catheter removal and she took dose last night and her INR today is 1.1. Need to know what pt should do? New dosing instructions?   Pls call to advise Thank you

## 2020-07-13 NOTE — PROGRESS NOTES
ANTICOAGULATION MONITORING    Fawad Watters, 1940      Anticoagulation Indication(s):  Afib    Referring Physician:   Dr. Cindy Bedoya  Goal INR Range:  2-3    Duration of Anticoagulation Therapy: indefinite  Home or Lab Draw: Gabrielle Martinez  Product patient has at home: warfarin 2 mg      Lab Results   Component Value Date    INR 1.40 07/13/2020    INR 2.1 07/06/2020    INR 1.90 06/29/2020    PROTIME 12.3 12/10/2018    PROTIME 11.5 11/30/2016    PROTIME 10.1 12/18/2014      INR 2.30 06/08/2020    INR 1.70 06/01/2020    INR 2.20 05/18/2020    INR 2.50 05/13/2020       INR Summary                          Warfarin regimen (mg)  Date INR A/P Sun Mon Tue Wed Thu Fri Sat Mg/wk                                                                      7/13/20 1.4 Below goal 5 7.5 7.5 5 5 5 5 40   7/6/20 2.1 At goal 5 5 5 5 5 5 5 35   6/29/20  1.9  Near goal 5 5 5 5 5 5 5 35   6/24/20 1.1 Drug held for procedure on 6/22 5 5 5 7.5 7.5 7.5 5    6/15/20 2.7 At goal 5 5 5 5 5 5 5 35   6/8/20 2.3 At goal 5 5 5 5 5 5 5 35   6/01/20 1.70 Not at goal  5 7.5 5 5 5 5 5 37.5   5/18/20 2.2 At goal 5 5 5 5 5 5 5 35   5/04/20 2.2 At goal, continue 4 4 6 4 4 4 4 30   4/30/20 3.3 At goal 4 4 6 4 4 4 4 30         Assessment/Plan:    Recent hospitalizations/HC visits None reported    Recent medication changes None reported   Medications taken regularly that may interact with warfarin or alter INR No significant drug interactions identified   Warfarin dose taken as prescribed Yes   Signs/symptoms of bleeding History of bleeding? No   Vitamin K intake Consistency of servings of green, leafy vegetables per week ?  Yes   Recent vomiting/diarrhea/fever None reported   Changes in weight, activity, stress None reported   alcohol use Patient reports having 0 drinks per day   Upcoming surgeries or procedures None reported     Patient's INR was below goal. Taking 7.5 mg Monday and Tuesday  Patient was also reminded to maintain consistent vitamin K intake and call with

## 2020-07-20 NOTE — PROGRESS NOTES
vitamin K intake and call with any bleeding, medication changes, or fever/vomiting/diarrhea. Next INR check: 7/17/20    Addendum:  7/20/20  Reviewed RN assessment and plan. INR is below  goal, increase current dose to 7.5 mg Monday and Tuesday  Repeat INR in one week/s. Patient/family instructed by RN.      Taras Smith, CNP

## 2020-07-28 NOTE — PROGRESS NOTES
ANTICOAGULATION MONITORING    Marilee Roberts, 1940      Anticoagulation Indication(s):  Afib    Referring Physician:   Dr. Dorothy Ruth  Goal INR Range:  2-3    Duration of Anticoagulation Therapy: indefinite  Home or Lab Draw: Tunde Bolivar  Product patient has at home: warfarin 2 mg      Lab Results   Component Value Date    INR 2.10 07/27/2020    INR 2.20 07/20/2020    INR 1.40 07/13/2020    PROTIME 12.3 12/10/2018    PROTIME 11.5 11/30/2016    PROTIME 10.1 12/18/2014      INR 2.30 06/08/2020    INR 1.70 06/01/2020    INR 2.20 05/18/2020    INR 2.50 05/13/2020       INR Summary                          Warfarin regimen (mg)  Date INR A/P Sun Mon Tue Wed Thu Fri Sat Mg/wk                                            7/27/20 2.1 At goal 5 7.5 7.5 5 5 5 5 40   7/20/20 2.2 At goal 5 7.5 7.5 5 5 5 5 40   7/13/20 1.4 Below goal 5 7.5 7.5 5 5 5 5 40   7/6/20 2.1 At goal 5 5 5 5 5 5 5 35   6/29/20  1.9  Near goal 5 5 5 5 5 5 5 35   6/24/20 1.1 Drug held for procedure on 6/22 5 5 5 7.5 7.5 7.5 5    6/15/20 2.7 At goal 5 5 5 5 5 5 5 35   6/8/20 2.3 At goal 5 5 5 5 5 5 5 35   6/01/20 1.70 Not at goal  5 7.5 5 5 5 5 5 37.5   5/18/20 2.2 At goal 5 5 5 5 5 5 5 35   5/04/20 2.2 At goal, continue 4 4 6 4 4 4 4 30   4/30/20 3.3 At goal 4 4 6 4 4 4 4 30         Assessment/Plan:    Recent hospitalizations/HC visits None reported    Recent medication changes None reported   Medications taken regularly that may interact with warfarin or alter INR No significant drug interactions identified   Warfarin dose taken as prescribed Yes   Signs/symptoms of bleeding History of bleeding? No   Vitamin K intake Consistency of servings of green, leafy vegetables per week ?  Yes   Recent vomiting/diarrhea/fever None reported   Changes in weight, activity, stress None reported   alcohol use Patient reports having 0 drinks per day   Upcoming surgeries or procedures None reported     Patient's INR was below goal. Taking 7.5 mg Monday and Tuesday  Patient was also reminded to maintain consistent vitamin K intake and call with any bleeding, medication changes, or fever/vomiting/diarrhea. Next INR check:8/4/20    Addendum:  7/28/20  Reviewed RN assessment and plan. INR is below  goal, increase current dose to 7.5 mg Monday and Tuesday  Repeat INR in one week/s. Patient/family instructed by RN.      Amy Olivo, CNP

## 2020-08-10 NOTE — PROGRESS NOTES
ANTICOAGULATION MONITORING    Shayla Anders, 1940      Anticoagulation Indication(s):  Afib    Referring Physician:   Dr. Kamron Montano  Goal INR Range:  2-3    Duration of Anticoagulation Therapy: indefinite  Home or Lab Draw: Carolyn Medina  Product patient has at home: warfarin 2 mg    Lab Results   Component Value Date    INR 1.90 08/10/2020    INR 1.90 08/03/2020    INR 2.10 07/27/2020    PROTIME 12.3 12/10/2018    PROTIME 11.5 11/30/2016    PROTIME 10.1 12/18/2014        Component Value Date    INR 2.10 07/27/2020    INR 2.20 07/20/2020    INR 1.40 07/13/2020       INR Summary                          Warfarin regimen (mg)  Date INR A/P Sun Mon Tue Wed Thu Fri Sat Mg/wk                                                                                                                                                                 8/10/20 1.9 At goal 5 7.5 7.5 5 5 5 5 40   7/27/20 2.1 At goal 5 7.5 7.5 5 5 5 5 40   7/20/20 2.2 At goal 5 7.5 7.5 5 5 5 5 40   7/13/20 1.4 Below goal 5 7.5 7.5 5 5 5 5 40   7/6/20 2.1 At goal 5 5 5 5 5 5 5 35   6/29/20  1.9  Near goal 5 5 5 5 5 5 5 35   6/24/20 1.1 Drug held for procedure on 6/22 5 5 5 7.5 7.5 7.5 5    6/15/20 2.7 At goal 5 5 5 5 5 5 5 35   6/8/20 2.3 At goal 5 5 5 5 5 5 5 35   6/01/20 1.70 Not at goal  5 7.5 5 5 5 5 5 37.5   5/18/20 2.2 At goal 5 5 5 5 5 5 5 35   5/04/20 2.2 At goal, continue 4 4 6 4 4 4 4 30   4/30/20 3.3 At goal 4 4 6 4 4 4 4 30         Assessment/Plan:    Recent hospitalizations/HC visits None reported    Recent medication changes None reported   Medications taken regularly that may interact with warfarin or alter INR No significant drug interactions identified   Warfarin dose taken as prescribed Yes   Signs/symptoms of bleeding History of bleeding? No   Vitamin K intake Consistency of servings of green, leafy vegetables per week ?  Yes   Recent vomiting/diarrhea/fever None reported   Changes in weight, activity, stress None reported   alcohol use Patient reports having 0 drinks per day   Upcoming surgeries or procedures None reported     Patient's INR was near goal. Continue same dosing  Patient was also reminded to maintain consistent vitamin K intake and call with any bleeding, medication changes, or fever/vomiting/diarrhea. Next INR check:8/17/20    Addendum:  8/10/20  Reviewed RN assessment and plan. INR is near goal  Repeat INR in one week/s. Patient/family instructed.      Delores Layne, CNP

## 2020-08-17 NOTE — TELEPHONE ENCOUNTER
Pt calling to give the name of the medication they switched her to is Metoprolol 25mg BID pls call to advise thank you.

## 2020-08-17 NOTE — PROGRESS NOTES
ANTICOAGULATION MONITORING    Edie Velasquez, 1940      Anticoagulation Indication(s):  Afib    Referring Physician:   Dr. Vasquez Masters  Goal INR Range:  2-3    Duration of Anticoagulation Therapy: indefinite  Home or Lab Draw: Dayo Ramirez  Product patient has at home: warfarin 2 mg    Lab Results   Component Value Date    INR 1.50 08/17/2020    INR 1.90 08/10/2020    INR 1.90 08/03/2020    PROTIME 12.3 12/10/2018    PROTIME 11.5 11/30/2016    PROTIME 10.1 12/18/2014        Component Value Date    INR 2.10 07/27/2020    INR 2.20 07/20/2020    INR 1.40 07/13/2020       INR Summary                          Warfarin regimen (mg)  Date INR A/P Sun Mon Tue Wed Thu Fri Sat Mg/wk                                                                                                                          9/9/20 1.8 Below goal           9/7/20 4.0 Above goal  hold hold rechec       8/17/20 1.5 Below goal 5 10 7.5 5 5 5 5 42.5   8/10/20 1.9 At goal 5 7.5 7.5 5 5 5 5 40   7/27/20 2.1 At goal 5 7.5 7.5 5 5 5 5 40   7/20/20 2.2 At goal 5 7.5 7.5 5 5 5 5 40   7/13/20 1.4 Below goal 5 7.5 7.5 5 5 5 5 40   7/6/20 2.1 At goal 5 5 5 5 5 5 5 35   6/29/20  1.9  Near goal 5 5 5 5 5 5 5 35   6/24/20 1.1 Drug held for procedure on 6/22 5 5 5 7.5 7.5 7.5 5    6/15/20 2.7 At goal 5 5 5 5 5 5 5 35   6/8/20 2.3 At goal 5 5 5 5 5 5 5 35   6/01/20 1.70 Not at goal  5 7.5 5 5 5 5 5 37.5   5/18/20 2.2 At goal 5 5 5 5 5 5 5 35   5/04/20 2.2 At goal, continue 4 4 6 4 4 4 4 30   4/30/20 3.3 At goal 4 4 6 4 4 4 4 30         Assessment/Plan:    Recent hospitalizations/HC visits None reported    Recent medication changes Coreg d/c, started Metoprolol Tartrate 25 mg BID   Medications taken regularly that may interact with warfarin or alter INR No significant drug interactions identified   Warfarin dose taken as prescribed Yes   Signs/symptoms of bleeding History of bleeding? No   Vitamin K intake Consistency of servings of green, leafy vegetables per week ?  Yes

## 2020-08-24 NOTE — PROGRESS NOTES
She told me she takes coumadin 5 mg nightly.  Advised her to take 7.5 mg nightly and INR tested next week

## 2020-08-31 NOTE — TELEPHONE ENCOUNTER
Coumadin/PT/INR    PT: 23.4    INR: 1.9      What dosage do you take daily? Does not know  Person calling in results: Pat    Call back number?  [phone]    Where do you have your blood drawn?    (lab/HHRN/Home Monitor)

## 2020-09-01 NOTE — PROGRESS NOTES
ANTICOAGULATION MONITORING    Edith Mcgraw, 1940      Anticoagulation Indication(s):  Afib    Referring Physician:   Dr. Timur Jin  Goal INR Range:  2-3    Duration of Anticoagulation Therapy: indefinite  Home or Lab Draw: Naresh Juarez  Product patient has at home: warfarin 2 mg    Lab Results   Component Value Date    INR 1.90 08/31/2020    INR 1.30 08/24/2020    INR 1.50 08/17/2020    PROTIME 12.3 12/10/2018    PROTIME 11.5 11/30/2016    PROTIME 10.1 12/18/2014        Component Value Date    INR 2.10 07/27/2020    INR 2.20 07/20/2020    INR 1.40 07/13/2020       INR Summary                          Warfarin regimen (mg)  Date INR A/P Sun Mon Tue Wed Thu Fri Sat Mg/wk                                                                                                                          8/31/20 1.90 Near goal 7.5 7.5 7.5 7.5 7.5 7.5 7.5 52.5   8/24/20 1.30 Below goal 7.5 7.5 7.5 7.5 7.5 7.5 7.5 49   8/17/20 1.5 Below goal 5 10 7.5 5 5 5 5 42.5   8/10/20 1.9 At goal 5 7.5 7.5 5 5 5 5 40   7/27/20 2.1 At goal 5 7.5 7.5 5 5 5 5 40   7/20/20 2.2 At goal 5 7.5 7.5 5 5 5 5 40   7/13/20 1.4 Below goal 5 7.5 7.5 5 5 5 5 40   7/6/20 2.1 At goal 5 5 5 5 5 5 5 35   6/29/20  1.9  Near goal 5 5 5 5 5 5 5 35   6/24/20 1.1 Drug held for procedure on 6/22 5 5 5 7.5 7.5 7.5 5    6/15/20 2.7 At goal 5 5 5 5 5 5 5 35   6/8/20 2.3 At goal 5 5 5 5 5 5 5 35   6/01/20 1.70 Not at goal  5 7.5 5 5 5 5 5 37.5   5/18/20 2.2 At goal 5 5 5 5 5 5 5 35   5/04/20 2.2 At goal, continue 4 4 6 4 4 4 4 30   4/30/20 3.3 At goal 4 4 6 4 4 4 4 30         Assessment/Plan:    Recent hospitalizations/HC visits None reported    Recent medication changes Coreg d/c, started Metoprolol Tartrate 25 mg BID   Medications taken regularly that may interact with warfarin or alter INR No significant drug interactions identified   Warfarin dose taken as prescribed Yes   Signs/symptoms of bleeding History of bleeding?  No   Vitamin K intake Consistency of servings of green, leafy vegetables per week ? Yes   Recent vomiting/diarrhea/fever None reported   Changes in weight, activity, stress None reported   alcohol use Patient reports having 0 drinks per day   Upcoming surgeries or procedures None reported     On 8/24/20: LES: dosed at 7.5 mg qpm    Patient's INR was below goal-gave increased dosing schedule  Patient was also reminded to maintain consistent vitamin K intake and call with any bleeding, medication changes, or fever/vomiting/diarrhea. Next INR check: 9/8/20    Addendum:  9/1/20  Reviewed assessment and plan. INR is below goal  Repeat INR in one week/s. Patient/family instructed.      Rosita Gerardo, CNP

## 2020-09-08 NOTE — PROGRESS NOTES
ANTICOAGULATION MONITORING    Tricia Retana, 1940    Anticoagulation Indication(s):  Afib    Referring Physician:   Dr. Paul Osullivan  Goal INR Range:  2-3    Duration of Anticoagulation Therapy: indefinite  Home or Lab Draw: Ness  Product patient has at home: warfarin 2 mg    Lab Results   Component Value Date    INR 4.00 09/07/2020    INR 1.90 08/31/2020    INR 1.30 08/24/2020    PROTIME 12.3 12/10/2018    PROTIME 11.5 11/30/2016    PROTIME 10.1 12/18/2014        Component Value Date    INR 2.10 07/27/2020    INR 2.20 07/20/2020    INR 1.40 07/13/2020       INR Summary                          Warfarin regimen (mg)  Date INR A/P Sun Mon Tue Wed Thu Fri Sat Mg/wk                                                                                                             9/7/20 4.0 Over goal 7.5 hold hold        8/31/20 1.90 Near goal 7.5 7.5 7.5 7.5 7.5 7.5 7.5 52.5   8/24/20 1.30 Below goal 7.5 7.5 7.5 7.5 7.5 7.5 7.5 49   8/17/20 1.5 Below goal 5 10 7.5 5 5 5 5 42.5   8/10/20 1.9 At goal 5 7.5 7.5 5 5 5 5 40   7/27/20 2.1 At goal 5 7.5 7.5 5 5 5 5 40   7/20/20 2.2 At goal 5 7.5 7.5 5 5 5 5 40   7/13/20 1.4 Below goal 5 7.5 7.5 5 5 5 5 40   7/6/20 2.1 At goal 5 5 5 5 5 5 5 35   6/29/20  1.9  Near goal 5 5 5 5 5 5 5 35   6/24/20 1.1 Drug held for procedure on 6/22 5 5 5 7.5 7.5 7.5 5    6/15/20 2.7 At goal 5 5 5 5 5 5 5 35   6/8/20 2.3 At goal 5 5 5 5 5 5 5 35   6/01/20 1.70 Not at goal  5 7.5 5 5 5 5 5 37.5   5/18/20 2.2 At goal 5 5 5 5 5 5 5 35   5/04/20 2.2 At goal, continue 4 4 6 4 4 4 4 30   4/30/20 3.3 At goal 4 4 6 4 4 4 4 30         Assessment/Plan:    Recent hospitalizations/HC visits None reported    Recent medication changes Coreg d/c, started Metoprolol Tartrate 25 mg BID   Medications taken regularly that may interact with warfarin or alter INR No significant drug interactions identified   Warfarin dose taken as prescribed Yes   Signs/symptoms of bleeding History of bleeding?  No   Vitamin K intake Consistency of servings of green, leafy vegetables per week ? Yes   Recent vomiting/diarrhea/fever None reported   Changes in weight, activity, stress None reported   alcohol use Patient reports having 0 drinks per day   Upcoming surgeries or procedures None reported     On 8/24/20: LES: dosed at 7.5 mg qpm    Patient's INR was below goal-gave increased dosing schedule  Patient was also reminded to maintain consistent vitamin K intake and call with any bleeding, medication changes, or fever/vomiting/diarrhea. Next INR check: 9/10/20    Addendum:  9/8/20  Reviewed assessment and plan. INR is above goal, change current dose by holding 9/7, 9/8 and rechecking Thursday  Repeat INR in two days. Patient/family instructed.      Raeanne Barthel, CNP

## 2020-09-08 NOTE — TELEPHONE ENCOUNTER
Spoke to the pt. She was at dialysis yesterday, 9/7. Doctor on call advised her to hold the Coumadin, and call the office today. See anti-coag encounter.

## 2020-09-08 NOTE — TELEPHONE ENCOUNTER
Debbie calling to see what she needs to do about her INR being at 4.0. Please call to advise. Thank you.

## 2020-09-08 NOTE — TELEPHONE ENCOUNTER
Pt INR 4.0 at dialysis on Saturday, instructed to hold coumadinon weekend and would be contacted on Tuesday. Pt of Dr. Paul Osullivan.

## 2020-09-09 NOTE — PROGRESS NOTES
ANTICOAGULATION MONITORING    Davidson Hardy, 1940    Anticoagulation Indication(s):  Afib    Referring Physician:   Dr. Tigre Eric  Goal INR Range:  2-3    Duration of Anticoagulation Therapy: indefinite  Home or Lab Draw: Scottie Gross [id number]  Product patient has at home: warfarin 2 mg    Lab Results   Component Value Date    INR 1.80 09/09/2020    INR 4.00 09/07/2020    INR 1.90 08/31/2020    PROTIME 12.3 12/10/2018    PROTIME 11.5 11/30/2016    PROTIME 10.1 12/18/2014        Component Value Date    INR 2.10 07/27/2020    INR 2.20 07/20/2020    INR 1.40 07/13/2020       INR Summary                          Warfarin regimen (mg)  Date INR A/P Sun Mon Tue Wed Thu Fri Sat Mg/wk                                                                                         recheck       9/9/20 1.8 Below goal 5 7.5 5 7.5 5 7.5 5 32.5   9/7/20 4.0 Over goal 7.5 hold hold 7.5 5 7.5 5 32.5   8/31/20 1.90 Near goal 7.5 7.5 7.5 7.5 7.5 7.5 7.5 52.5   8/24/20 1.30 Below goal 7.5 7.5 7.5 7.5 7.5 7.5 7.5 49   8/17/20 1.5 Below goal 5 10 7.5 5 5 5 5 42.5   8/10/20 1.9 At goal 5 7.5 7.5 5 5 5 5 40   7/27/20 2.1 At goal 5 7.5 7.5 5 5 5 5 40   7/20/20 2.2 At goal 5 7.5 7.5 5 5 5 5 40   7/13/20 1.4 Below goal 5 7.5 7.5 5 5 5 5 40   7/6/20 2.1 At goal 5 5 5 5 5 5 5 35   6/29/20  1.9  Near goal 5 5 5 5 5 5 5 35   6/24/20 1.1 Drug held for procedure on 6/22 5 5 5 7.5 7.5 7.5 5    6/15/20 2.7 At goal 5 5 5 5 5 5 5 35   6/8/20 2.3 At goal 5 5 5 5 5 5 5 35   6/01/20 1.70 Not at goal  5 7.5 5 5 5 5 5 37.5   5/18/20 2.2 At goal 5 5 5 5 5 5 5 35   5/04/20 2.2 At goal, continue 4 4 6 4 4 4 4 30   4/30/20 3.3 At goal 4 4 6 4 4 4 4 30         Assessment/Plan:    Recent hospitalizations/HC visits None reported    Recent medication changes Coreg d/c, started Metoprolol Tartrate 25 mg BID   Medications taken regularly that may interact with warfarin or alter INR No significant drug interactions identified   Warfarin dose taken as prescribed Yes   Signs/symptoms of

## 2020-09-09 NOTE — TELEPHONE ENCOUNTER
Pt calling her INR today was 1.8 and she wants to know if she should take her 5mg of warfarin tonight? pls call to advise thank you

## 2020-09-21 NOTE — PROGRESS NOTES
ANTICOAGULATION MONITORING    [facility], 1940    Anticoagulation Indication(s):  Afib    Referring Physician:   Dr. Balbina Blackman  Goal INR Range:  2-3    Duration of Anticoagulation Therapy: indefinite  Home or Lab Draw: Alli Benitez  Product patient has at home: warfarin 2 mg    Lab Results   Component Value Date    INR 3.10 09/21/2020    INR 1.80 09/09/2020    INR 4.00 09/07/2020    PROTIME 12.3 12/10/2018    PROTIME 11.5 11/30/2016    PROTIME 10.1 12/18/2014        Component Value Date    INR 2.10 07/27/2020    INR 2.20 07/20/2020    INR 1.40 07/13/2020       INR Summary                          Warfarin regimen (mg)  Date INR A/P Sun Mon Tue Wed Thu Fri Sat Mg/wk                                                                                   9/21/20 3.1 At goal 5 7.5 5 7.5 5 7.5 5 32.5   9/9/20 1.8 Below goal 5 7.5 5 7.5 5 7.5 5 32.5   9/7/20 4.0 Over goal 7.5 hold hold 7.5 5 7.5 5 32.5   8/31/20 1.90 Near goal 7.5 7.5 7.5 7.5 7.5 7.5 7.5 52.5   8/24/20 1.30 Below goal 7.5 7.5 7.5 7.5 7.5 7.5 7.5 49   8/17/20 1.5 Below goal 5 10 7.5 5 5 5 5 42.5   8/10/20 1.9 At goal 5 7.5 7.5 5 5 5 5 40   7/27/20 2.1 At goal 5 7.5 7.5 5 5 5 5 40   7/20/20 2.2 At goal 5 7.5 7.5 5 5 5 5 40   7/13/20 1.4 Below goal 5 7.5 7.5 5 5 5 5 40   7/6/20 2.1 At goal 5 5 5 5 5 5 5 35   6/29/20  1.9  Near goal 5 5 5 5 5 5 5 35   6/24/20 1.1 Drug held for procedure on 6/22 5 5 5 7.5 7.5 7.5 5    6/15/20 2.7 At goal 5 5 5 5 5 5 5 35   6/8/20 2.3 At goal 5 5 5 5 5 5 5 35   6/01/20 1.70 Not at goal  5 7.5 5 5 5 5 5 37.5   5/18/20 2.2 At goal 5 5 5 5 5 5 5 35   5/04/20 2.2 At goal, continue 4 4 6 4 4 4 4 30   4/30/20 3.3 At goal 4 4 6 4 4 4 4 30         Assessment/Plan:    Recent hospitalizations/HC visits None reported    Recent medication changes Coreg d/c, started Metoprolol Tartrate 25 mg BID   Medications taken regularly that may interact with warfarin or alter INR No significant drug interactions identified   Warfarin dose taken as prescribed

## 2020-09-28 NOTE — PROGRESS NOTES
ANTICOAGULATION MONITORING    Elijah Ferraros, 1940    Anticoagulation Indication(s):  Afib    Referring Physician:   Dr. Bonni Bosworth  Goal INR Range:  2-3    Duration of Anticoagulation Therapy: indefinite  Home or Lab Draw: Angela Wilkinson  Product patient has at home: warfarin 2 mg    Lab Results   Component Value Date    INR 3.20 09/28/2020    INR 3.10 09/21/2020    INR 1.80 09/09/2020    PROTIME 12.3 12/10/2018    PROTIME 11.5 11/30/2016    PROTIME 10.1 12/18/2014        Component Value Date    INR 2.10 07/27/2020    INR 2.20 07/20/2020    INR 1.40 07/13/2020       INR Summary                          Warfarin regimen (mg)  Date INR A/P Sun Mon Tue Wed Thu Fri Sat Mg/wk                                                                      9/28/20 3.2 Slightly above Goal 5 7.5 5 7.5 5 7.5 5 42.5   9/21/20 3.1 At goal 5 7.5 5 7.5 5 7.5 5 32.5   9/9/20 1.8 Below goal 5 7.5 5 7.5 5 7.5 5 32.5   9/7/20 4.0 Over goal 7.5 hold hold 7.5 5 7.5 5 32.5   8/31/20 1.90 Near goal 7.5 7.5 7.5 7.5 7.5 7.5 7.5 52.5   8/24/20 1.30 Below goal 7.5 7.5 7.5 7.5 7.5 7.5 7.5 49   8/17/20 1.5 Below goal 5 10 7.5 5 5 5 5 42.5   8/10/20 1.9 At goal 5 7.5 7.5 5 5 5 5 40   7/27/20 2.1 At goal 5 7.5 7.5 5 5 5 5 40   7/20/20 2.2 At goal 5 7.5 7.5 5 5 5 5 40   7/13/20 1.4 Below goal 5 7.5 7.5 5 5 5 5 40   7/6/20 2.1 At goal 5 5 5 5 5 5 5 35   6/29/20  1.9  Near goal 5 5 5 5 5 5 5 35   6/24/20 1.1 Drug held for procedure on 6/22 5 5 5 7.5 7.5 7.5 5    6/15/20 2.7 At goal 5 5 5 5 5 5 5 35   6/8/20 2.3 At goal 5 5 5 5 5 5 5 35   6/01/20 1.70 Not at goal  5 7.5 5 5 5 5 5 37.5   5/18/20 2.2 At goal 5 5 5 5 5 5 5 35   5/04/20 2.2 At goal, continue 4 4 6 4 4 4 4 30   4/30/20 3.3 At goal 4 4 6 4 4 4 4 30         Assessment/Plan:    Recent hospitalizations/HC visits None reported    Recent medication changes No   Medications taken regularly that may interact with warfarin or alter INR No significant drug interactions identified   Warfarin dose taken as prescribed Yes   Signs/symptoms of bleeding History of bleeding? No   Vitamin K intake Consistency of servings of green, leafy vegetables per week ? Yes   Recent vomiting/diarrhea/fever None reported   Changes in weight, activity, stress None reported   alcohol use Patient reports having 0 drinks per day   Upcoming surgeries or procedures None reported       Patient's INR is at goal-same dosing schedule  Patient was also reminded to maintain consistent vitamin K intake and call with any bleeding, medication changes, or fever/vomiting/diarrhea. Next INR check: 1 week    Addendum:  9/28/20  Reviewed assessment and plan. INR is at goal-same dosage instructions  Repeat INR in one week   Patient/family instructed.      Jorge Wong, CNP

## 2020-10-06 NOTE — PROGRESS NOTES
ANTICOAGULATION MONITORING    Lennox Lone, 1940    Anticoagulation Indication(s):  Afib    Referring Physician:   Dr. Lavern Menchaca  Goal INR Range:  2-3    Duration of Anticoagulation Therapy: indefinite  Home or Lab Draw: Tomas Fraga  Product patient has at home: warfarin 2 mg    Lab Results   Component Value Date    INR 2.90 10/05/2020    INR 3.20 09/28/2020    INR 3.10 09/21/2020    PROTIME 12.3 12/10/2018    PROTIME 11.5 11/30/2016    PROTIME 10.1 12/18/2014        Component Value Date    INR 2.10 07/27/2020    INR 2.20 07/20/2020    INR 1.40 07/13/2020       INR Summary                          Warfarin regimen (mg)  Date INR A/P Sun Mon Tue Wed Thu Fri Sat Mg/wk                                                         10/5/20 2.9 At goal 5 7.5 5 7.5 5 7.5 5 42.5   9/28/20 3.2 Slightly above Goal 5 7.5 5 7.5 5 7.5 5 42.5   9/21/20 3.1 At goal 5 7.5 5 7.5 5 7.5 5 32.5   9/9/20 1.8 Below goal 5 7.5 5 7.5 5 7.5 5 32.5   9/7/20 4.0 Over goal 7.5 hold hold 7.5 5 7.5 5 32.5   8/31/20 1.90 Near goal 7.5 7.5 7.5 7.5 7.5 7.5 7.5 52.5   8/24/20 1.30 Below goal 7.5 7.5 7.5 7.5 7.5 7.5 7.5 49   8/17/20 1.5 Below goal 5 10 7.5 5 5 5 5 42.5   8/10/20 1.9 At goal 5 7.5 7.5 5 5 5 5 40   7/27/20 2.1 At goal 5 7.5 7.5 5 5 5 5 40   7/20/20 2.2 At goal 5 7.5 7.5 5 5 5 5 40   7/13/20 1.4 Below goal 5 7.5 7.5 5 5 5 5 40   7/6/20 2.1 At goal 5 5 5 5 5 5 5 35   6/29/20  1.9  Near goal 5 5 5 5 5 5 5 35   6/24/20 1.1 Drug held for procedure on 6/22 5 5 5 7.5 7.5 7.5 5    6/15/20 2.7 At goal 5 5 5 5 5 5 5 35   6/8/20 2.3 At goal 5 5 5 5 5 5 5 35   6/01/20 1.70 Not at goal  5 7.5 5 5 5 5 5 37.5   5/18/20 2.2 At goal 5 5 5 5 5 5 5 35   5/04/20 2.2 At goal, continue 4 4 6 4 4 4 4 30   4/30/20 3.3 At goal 4 4 6 4 4 4 4 30         Assessment/Plan:    Recent hospitalizations/HC visits None reported    Recent medication changes No   Medications taken regularly that may interact with warfarin or alter INR No significant drug interactions identified

## 2020-10-12 NOTE — PROGRESS NOTES
ANTICOAGULATION MONITORING    Jigna Stevens, 1940    Anticoagulation Indication(s):  Afib    Referring Physician:   Dr. Laure Martins  Goal INR Range:  2-3    Duration of Anticoagulation Therapy: indefinite  Home or Lab Draw: [facility]  Product patient has at home: warfarin 2 mg    Lab Results   Component Value Date    INR 3.30 10/12/2020    INR 2.90 10/05/2020    INR 3.20 09/28/2020    PROTIME 12.3 12/10/2018    PROTIME 11.5 11/30/2016    PROTIME 10.1 12/18/2014        Component Value Date    INR 2.10 07/27/2020    INR 2.20 07/20/2020    INR 1.40 07/13/2020       INR Summary                          Warfarin regimen (mg)  Date INR A/P Sun Mon Tue Wed Thu Fri Sat Mg/wk                                            10/12/20 3.3 Slightly above goal 5 7.5 5 7.5 5 7.5 5 42.5   10/5/20 2.9 At goal 5 7.5 5 7.5 5 7.5 5 42.5   9/28/20 3.2 Slightly above Goal 5 7.5 5 7.5 5 7.5 5 42.5   9/21/20 3.1 At goal 5 7.5 5 7.5 5 7.5 5 32.5   9/9/20 1.8 Below goal 5 7.5 5 7.5 5 7.5 5 32.5   9/7/20 4.0 Over goal 7.5 hold hold 7.5 5 7.5 5 32.5   8/31/20 1.90 Near goal 7.5 7.5 7.5 7.5 7.5 7.5 7.5 52.5   8/24/20 1.30 Below goal 7.5 7.5 7.5 7.5 7.5 7.5 7.5 49   8/17/20 1.5 Below goal 5 10 7.5 5 5 5 5 42.5   8/10/20 1.9 At goal 5 7.5 7.5 5 5 5 5 40   7/27/20 2.1 At goal 5 7.5 7.5 5 5 5 5 40   7/20/20 2.2 At goal 5 7.5 7.5 5 5 5 5 40   7/13/20 1.4 Below goal 5 7.5 7.5 5 5 5 5 40   7/6/20 2.1 At goal 5 5 5 5 5 5 5 35   6/29/20  1.9  Near goal 5 5 5 5 5 5 5 35   6/24/20 1.1 Drug held for procedure on 6/22 5 5 5 7.5 7.5 7.5 5    6/15/20 2.7 At goal 5 5 5 5 5 5 5 35   6/8/20 2.3 At goal 5 5 5 5 5 5 5 35   6/01/20 1.70 Not at goal  5 7.5 5 5 5 5 5 37.5   5/18/20 2.2 At goal 5 5 5 5 5 5 5 35   5/04/20 2.2 At goal, continue 4 4 6 4 4 4 4 30   4/30/20 3.3 At goal 4 4 6 4 4 4 4 30         Assessment/Plan:    Recent hospitalizations/HC visits None reported    Recent medication changes No   Medications taken regularly that may interact with warfarin or alter INR

## 2020-10-20 NOTE — PROGRESS NOTES
ANTICOAGULATION MONITORING    Jigna Stevens, 1940    Anticoagulation Indication(s):  Afib    Referring Physician:   Dr. Laure Martins  Goal INR Range:  2-3    Duration of Anticoagulation Therapy: indefinite  Home or Lab Draw: Handy Getting  Product patient has at home: warfarin 2 mg    Lab Results   Component Value Date    INR 3.10 10/20/2020    INR 3.30 10/12/2020    INR 2.90 10/05/2020    PROTIME 12.3 12/10/2018    PROTIME 11.5 11/30/2016    PROTIME 10.1 12/18/2014        Component Value Date    INR 2.10 07/27/2020    INR 2.20 07/20/2020    INR 1.40 07/13/2020       INR Summary                          Warfarin regimen (mg)  Date INR A/P Sun Mon Tue Wed Thu Fri Sat Mg/wk                               10/20/20 3.1 At goal 5 7.5 5 7.5 5 7.5 5 42.5   10/12/20 3.3 Slightly above goal 5 7.5 5 7.5 5 7.5 5 42.5   10/5/20 2.9 At goal 5 7.5 5 7.5 5 7.5 5 42.5   9/28/20 3.2 Slightly above Goal 5 7.5 5 7.5 5 7.5 5 42.5   9/21/20 3.1 At goal 5 7.5 5 7.5 5 7.5 5 32.5   9/9/20 1.8 Below goal 5 7.5 5 7.5 5 7.5 5 32.5   9/7/20 4.0 Over goal 7.5 hold hold 7.5 5 7.5 5 32.5   8/31/20 1.90 Near goal 7.5 7.5 7.5 7.5 7.5 7.5 7.5 52.5   8/24/20 1.30 Below goal 7.5 7.5 7.5 7.5 7.5 7.5 7.5 49   8/17/20 1.5 Below goal 5 10 7.5 5 5 5 5 42.5   8/10/20 1.9 At goal 5 7.5 7.5 5 5 5 5 40   7/27/20 2.1 At goal 5 7.5 7.5 5 5 5 5 40   7/20/20 2.2 At goal 5 7.5 7.5 5 5 5 5 40   7/13/20 1.4 Below goal 5 7.5 7.5 5 5 5 5 40   7/6/20 2.1 At goal 5 5 5 5 5 5 5 35   6/29/20  1.9  Near goal 5 5 5 5 5 5 5 35   6/24/20 1.1 Drug held for procedure on 6/22 5 5 5 7.5 7.5 7.5 5    6/15/20 2.7 At goal 5 5 5 5 5 5 5 35   6/8/20 2.3 At goal 5 5 5 5 5 5 5 35   6/01/20 1.70 Not at goal  5 7.5 5 5 5 5 5 37.5   5/18/20 2.2 At goal 5 5 5 5 5 5 5 35   5/04/20 2.2 At goal, continue 4 4 6 4 4 4 4 30   4/30/20 3.3 At goal 4 4 6 4 4 4 4 30         Assessment/Plan:    Recent hospitalizations/HC visits None reported    Recent medication changes No   Medications taken regularly that may

## 2020-10-26 NOTE — PROGRESS NOTES
ANTICOAGULATION MONITORING    Jigna Stevens, 1940    Anticoagulation Indication(s):  Afib    Referring Physician:   Dr. Laure Martins  Goal INR Range:  2-3    Duration of Anticoagulation Therapy: indefinite  Home or Lab Draw: Handy Getting  Product patient has at home: warfarin 2 mg    Lab Results   Component Value Date    INR 2.30 10/26/2020    INR 3.10 10/20/2020    INR 3.30 10/12/2020    PROTIME 12.3 12/10/2018    PROTIME 11.5 11/30/2016    PROTIME 10.1 12/18/2014        Component Value Date    INR 2.10 07/27/2020    INR 2.20 07/20/2020    INR 1.40 07/13/2020       INR Summary                          Warfarin regimen (mg)  Date INR A/P Sun Mon Tue Wed Thu Fri Sat Mg/wk                                                                                   10/26/20 2.3 At goal 5 7.5 5 7.5 5 7.5 5 42.5   10/20/20 3.1 At goal 5 7.5 5 7.5 5 7.5 5 42.5   10/12/20 3.3 Slightly above goal 5 7.5 5 7.5 5 7.5 5 42.5   10/5/20 2.9 At goal 5 7.5 5 7.5 5 7.5 5 42.5   9/28/20 3.2 Slightly above Goal 5 7.5 5 7.5 5 7.5 5 42.5   9/21/20 3.1 At goal 5 7.5 5 7.5 5 7.5 5 32.5   9/9/20 1.8 Below goal 5 7.5 5 7.5 5 7.5 5 32.5   9/7/20 4.0 Over goal 7.5 hold hold 7.5 5 7.5 5 32.5   8/31/20 1.90 Near goal 7.5 7.5 7.5 7.5 7.5 7.5 7.5 52.5   8/24/20 1.30 Below goal 7.5 7.5 7.5 7.5 7.5 7.5 7.5 49   8/17/20 1.5 Below goal 5 10 7.5 5 5 5 5 42.5   8/10/20 1.9 At goal 5 7.5 7.5 5 5 5 5 40   7/27/20 2.1 At goal 5 7.5 7.5 5 5 5 5 40   7/20/20 2.2 At goal 5 7.5 7.5 5 5 5 5 40   7/13/20 1.4 Below goal 5 7.5 7.5 5 5 5 5 40   7/6/20 2.1 At goal 5 5 5 5 5 5 5 35   6/29/20  1.9  Near goal 5 5 5 5 5 5 5 35   6/24/20 1.1 Drug held for procedure on 6/22 5 5 5 7.5 7.5 7.5 5    6/15/20 2.7 At goal 5 5 5 5 5 5 5 35   6/8/20 2.3 At goal 5 5 5 5 5 5 5 35   6/01/20 1.70 Not at goal  5 7.5 5 5 5 5 5 37.5   5/18/20 2.2 At goal 5 5 5 5 5 5 5 35   5/04/20 2.2 At goal, continue 4 4 6 4 4 4 4 30   4/30/20 3.3 At goal 4 4 6 4 4 4 4 30         Assessment/Plan:    Recent hospitalizations/HC visits None reported    Recent medication changes No   Medications taken regularly that may interact with warfarin or alter INR No significant drug interactions identified   Warfarin dose taken as prescribed Yes   Signs/symptoms of bleeding History of bleeding? No   Vitamin K intake Consistency of servings of green, leafy vegetables per week ? Yes   Recent vomiting/diarrhea/fever None reported   Changes in weight, activity, stress None reported   alcohol use Patient reports having 0 drinks per day   Upcoming surgeries or procedures None reported       Patient's INR is at goal-same dosing schedule  Patient was also reminded to maintain consistent vitamin K intake and call with any bleeding, medication changes, or fever/vomiting/diarrhea. Next INR check: 1 week    Addendum:  10/26/20  Reviewed assessment and plan. INR is at goal-same dosage instructions  Repeat INR in one week   Patient/family instructed.      Alvarez Abel, CNP

## 2020-11-06 NOTE — PROGRESS NOTES
ANTICOAGULATION MONITORING    Stephen Rojas, 1940    Anticoagulation Indication(s):  Afib    Referring Physician:   Dr. Sultana Dukes  Goal INR Range:  2-3    Duration of Anticoagulation Therapy: indefinite  Home or Lab Draw: Matt Mckeon  Product patient has at home: warfarin 2 mg    Lab Results   Component Value Date    INR 2.90 11/06/2020    INR 2.30 10/26/2020    INR 3.10 10/20/2020    PROTIME 12.3 12/10/2018    PROTIME 11.5 11/30/2016    PROTIME 10.1 12/18/2014        Component Value Date    INR 2.10 07/27/2020    INR 2.20 07/20/2020    INR 1.40 07/13/2020       INR Summary                          Warfarin regimen (mg)  Date INR A/P Sun Mon Tue Wed Thu Fri Sat Mg/wk                                                                                   10/26/20 2.3 At goal 5 7.5 5 7.5 5 7.5 5 42.5   10/20/20 3.1 At goal 5 7.5 5 7.5 5 7.5 5 42.5   10/12/20 3.3 Slightly above goal 5 7.5 5 7.5 5 7.5 5 42.5   10/5/20 2.9 At goal 5 7.5 5 7.5 5 7.5 5 42.5   9/28/20 3.2 Slightly above Goal 5 7.5 5 7.5 5 7.5 5 42.5   9/21/20 3.1 At goal 5 7.5 5 7.5 5 7.5 5 32.5   9/9/20 1.8 Below goal 5 7.5 5 7.5 5 7.5 5 32.5   9/7/20 4.0 Over goal 7.5 hold hold 7.5 5 7.5 5 32.5   8/31/20 1.90 Near goal 7.5 7.5 7.5 7.5 7.5 7.5 7.5 52.5   8/24/20 1.30 Below goal 7.5 7.5 7.5 7.5 7.5 7.5 7.5 49   8/17/20 1.5 Below goal 5 10 7.5 5 5 5 5 42.5   8/10/20 1.9 At goal 5 7.5 7.5 5 5 5 5 40   7/27/20 2.1 At goal 5 7.5 7.5 5 5 5 5 40   7/20/20 2.2 At goal 5 7.5 7.5 5 5 5 5 40   7/13/20 1.4 Below goal 5 7.5 7.5 5 5 5 5 40   7/6/20 2.1 At goal 5 5 5 5 5 5 5 35   6/29/20  1.9  Near goal 5 5 5 5 5 5 5 35   6/24/20 1.1 Drug held for procedure on 6/22 5 5 5 7.5 7.5 7.5 5    6/15/20 2.7 At goal 5 5 5 5 5 5 5 35   6/8/20 2.3 At goal 5 5 5 5 5 5 5 35   6/01/20 1.70 Not at goal  5 7.5 5 5 5 5 5 37.5   5/18/20 2.2 At goal 5 5 5 5 5 5 5 35   5/04/20 2.2 At goal, continue 4 4 6 4 4 4 4 30   4/30/20 3.3 At goal 4 4 6 4 4 4 4 30         Assessment/Plan:    Recent hospitalizations/HC visits None reported    Recent medication changes No   Medications taken regularly that may interact with warfarin or alter INR No significant drug interactions identified   Warfarin dose taken as prescribed Yes   Signs/symptoms of bleeding History of bleeding? No   Vitamin K intake Consistency of servings of green, leafy vegetables per week ? Yes   Recent vomiting/diarrhea/fever None reported   Changes in weight, activity, stress None reported   alcohol use Patient reports having 0 drinks per day   Upcoming surgeries or procedures None reported       Patient's INR is at goal-same dosing schedule  Patient was also reminded to maintain consistent vitamin K intake and call with any bleeding, medication changes, or fever/vomiting/diarrhea. Next INR check: 1 week    Addendum:  11/6/20  Reviewed assessment and plan. INR is at goal-same dosage instructions  Repeat INR in one week   Patient/family instructed.      Eloisa Nam CNP        ANTICOAGULATION MONITORING    Jomar Booth, 1940    Anticoagulation Indication(s):  Afib    Referring Physician:   Dr. Kuldeep Almodovar  Goal INR Range:  2-3    Duration of Anticoagulation Therapy: indefinite  Home or Lab Draw: Sadie Dobango  Product patient has at home: warfarin 2 mg    Lab Results   Component Value Date    INR 2.90 11/06/2020    INR 2.30 10/26/2020    INR 3.10 10/20/2020    PROTIME 12.3 12/10/2018    PROTIME 11.5 11/30/2016    PROTIME 10.1 12/18/2014        Component Value Date    INR 2.10 07/27/2020    INR 2.20 07/20/2020    INR 1.40 07/13/2020       INR Summary                          Warfarin regimen (mg)  Date INR A/P Sun Mon Tue Wed Thu Fri Sat Mg/wk                                                                      11/6/20 2.9 At goal 5 7.5 5 7.5 5 7.5 5 42.5   10/26/20 2.3 At goal 5 7.5 5 7.5 5 7.5 5 42.5   10/20/20 3.1 At goal 5 7.5 5 7.5 5 7.5 5 42.5   10/12/20 3.3 Slightly above goal 5 7.5 5 7.5 5 7.5 5 42.5   10/5/20 2.9 At goal 5 7.5 5 7.5 5

## 2020-11-09 NOTE — PROGRESS NOTES
ANTICOAGULATION MONITORING    Jigna Stevens, 1940    Anticoagulation Indication(s):  Afib    Referring Physician:   Dr. Laure Martins  Goal INR Range:  2-3    Duration of Anticoagulation Therapy: indefinite  Home or Lab Draw: Handy Getting  Product patient has at home: warfarin 2 mg    Lab Results   Component Value Date    INR 2.90 11/09/2020    INR 2.90 11/06/2020    INR 2.30 10/26/2020    PROTIME 12.3 12/10/2018    PROTIME 11.5 11/30/2016    PROTIME 10.1 12/18/2014        Component Value Date    INR 2.10 07/27/2020    INR 2.20 07/20/2020    INR 1.40 07/13/2020       INR Summary                          Warfarin regimen (mg)  Date INR A/P Sun Mon Tue Wed Thu Fri Sat Mg/wk                                                         11/9/20 2.9 At goal  5 7.5 5 7.5 5 7.5 5 42.5   11/6/20 2.9 At goal 5 7.5 5 7.5 5 7.5 5 42.5   10/26/20 2.3 At goal 5 7.5 5 7.5 5 7.5 5 42.5   10/20/20 3.1 At goal 5 7.5 5 7.5 5 7.5 5 42.5   10/12/20 3.3 Slightly above goal 5 7.5 5 7.5 5 7.5 5 42.5   10/5/20 2.9 At goal 5 7.5 5 7.5 5 7.5 5 42.5   9/28/20 3.2 Slightly above Goal 5 7.5 5 7.5 5 7.5 5 42.5   9/21/20 3.1 At goal 5 7.5 5 7.5 5 7.5 5 32.5   9/9/20 1.8 Below goal 5 7.5 5 7.5 5 7.5 5 32.5   9/7/20 4.0 Over goal 7.5 hold hold 7.5 5 7.5 5 32.5   8/31/20 1.90 Near goal 7.5 7.5 7.5 7.5 7.5 7.5 7.5 52.5   8/24/20 1.30 Below goal 7.5 7.5 7.5 7.5 7.5 7.5 7.5 49   8/17/20 1.5 Below goal 5 10 7.5 5 5 5 5 42.5   8/10/20 1.9 At goal 5 7.5 7.5 5 5 5 5 40   7/27/20 2.1 At goal 5 7.5 7.5 5 5 5 5 40   7/20/20 2.2 At goal 5 7.5 7.5 5 5 5 5 40   7/13/20 1.4 Below goal 5 7.5 7.5 5 5 5 5 40   7/6/20 2.1 At goal 5 5 5 5 5 5 5 35   6/29/20  1.9  Near goal 5 5 5 5 5 5 5 35   6/24/20 1.1 Drug held for procedure on 6/22 5 5 5 7.5 7.5 7.5 5    6/15/20 2.7 At goal 5 5 5 5 5 5 5 35   6/8/20 2.3 At goal 5 5 5 5 5 5 5 35   6/01/20 1.70 Not at goal  5 7.5 5 5 5 5 5 37.5   5/18/20 2.2 At goal 5 5 5 5 5 5 5 35   5/04/20 2.2 At goal, continue 4 4 6 4 4 4 4 30   4/30/20 3.3 At goal 4 4 6 4 4 4 4 30         Assessment/Plan:    Recent hospitalizations/HC visits None reported    Recent medication changes No   Medications taken regularly that may interact with warfarin or alter INR No significant drug interactions identified   Warfarin dose taken as prescribed Yes   Signs/symptoms of bleeding History of bleeding? No   Vitamin K intake Consistency of servings of green, leafy vegetables per week ? Yes   Recent vomiting/diarrhea/fever None reported   Changes in weight, activity, stress None reported   alcohol use Patient reports having 0 drinks per day   Upcoming surgeries or procedures None reported       Patient's INR is at goal-same dosing schedule  Patient was also reminded to maintain consistent vitamin K intake and call with any bleeding, medication changes, or fever/vomiting/diarrhea. Next INR check: 11/16//20    Addendum:  11/9/20  Reviewed assessment and plan. INR is at goal-same dosage instructions  Repeat INR in one week   Patient/family instructed.      Cyndi Rene, CNP

## 2020-11-16 NOTE — PROGRESS NOTES
ANTICOAGULATION MONITORING    Judy Jin, 1940    Anticoagulation Indication(s):  Afib    Referring Physician:   Dr. Kristi Gruber  Goal INR Range:  2-3    Duration of Anticoagulation Therapy: indefinite  Home or Lab Draw: Anders Sandoval  Product patient has at home: warfarin 2 mg    Lab Results   Component Value Date    INR 3.20 11/16/2020    INR 2.90 11/09/2020    INR 2.90 11/06/2020    PROTIME 12.3 12/10/2018    PROTIME 11.5 11/30/2016    PROTIME 10.1 12/18/2014        Component Value Date    INR 2.10 07/27/2020    INR 2.20 07/20/2020    INR 1.40 07/13/2020       INR Summary                          Warfarin regimen (mg)  Date INR A/P Sun Mon Tue Wed Thu Fri Sat Mg/wk                                                                                                                                                                              11/16/20 3.2 Slightly over goal  5 7.5 5 7.5 5 7.5 5 42.5   11/9/20 2.9 At goal  5 7.5 5 7.5 5 7.5 5 42.5   11/6/20 2.9 At goal 5 7.5 5 7.5 5 7.5 5 42.5   10/26/20 2.3 At goal 5 7.5 5 7.5 5 7.5 5 42.5   10/20/20 3.1 At goal 5 7.5 5 7.5 5 7.5 5 42.5   10/12/20 3.3 Slightly above goal 5 7.5 5 7.5 5 7.5 5 42.5   10/5/20 2.9 At goal 5 7.5 5 7.5 5 7.5 5 42.5   9/28/20 3.2 Slightly above Goal 5 7.5 5 7.5 5 7.5 5 42.5   9/21/20 3.1 At goal 5 7.5 5 7.5 5 7.5 5 32.5   9/9/20 1.8 Below goal 5 7.5 5 7.5 5 7.5 5 32.5   9/7/20 4.0 Over goal 7.5 hold hold 7.5 5 7.5 5 32.5   8/31/20 1.90 Near goal 7.5 7.5 7.5 7.5 7.5 7.5 7.5 52.5   8/24/20 1.30 Below goal 7.5 7.5 7.5 7.5 7.5 7.5 7.5 49   8/17/20 1.5 Below goal 5 10 7.5 5 5 5 5 42.5   8/10/20 1.9 At goal 5 7.5 7.5 5 5 5 5 40   7/27/20 2.1 At goal 5 7.5 7.5 5 5 5 5 40        Assessment/Plan:    Recent hospitalizations/HC visits None reported    Recent medication changes No   Medications taken regularly that may interact with warfarin or alter INR No significant drug interactions identified   Warfarin dose taken as prescribed Yes   Signs/symptoms of bleeding History of bleeding? No   Vitamin K intake Consistency of servings of green, leafy vegetables per week ? Yes   Recent vomiting/diarrhea/fever None reported   Changes in weight, activity, stress None reported   alcohol use Patient reports having 0 drinks per day   Upcoming surgeries or procedures None reported       Patient's INR is at goal-same dosing schedule  Patient was also reminded to maintain consistent vitamin K intake and call with any bleeding, medication changes, or fever/vomiting/diarrhea. Next INR check: 11/16//20    Addendum:  11/16/20  Reviewed assessment and plan. INR is at goal-same dosage instructions  Repeat INR in one week   Patient/family instructed.      Meche Gomes, CNP

## 2020-11-23 NOTE — PROGRESS NOTES
ANTICOAGULATION MONITORING    Sruthi American, 1940    Anticoagulation Indication(s):  Afib    Referring Physician:   Dr. Maddie Rao  Goal INR Range:  2-3    Duration of Anticoagulation Therapy: indefinite  Home or Lab Draw: Scottie Gross [id number]  Product patient has at home: warfarin 2 mg    Lab Results   Component Value Date    INR 2.30 11/23/2020    INR 3.20 11/16/2020    INR 2.90 11/09/2020    PROTIME 12.3 12/10/2018    PROTIME 11.5 11/30/2016    PROTIME 10.1 12/18/2014        Component Value Date    INR 2.10 07/27/2020    INR 2.20 07/20/2020    INR 1.40 07/13/2020       INR Summary                          Warfarin regimen (mg)  Date INR A/P Sun Mon Tue Wed Thu Fri Sat Mg/wk                                                                                                                                                                 11/23/20 2.3 At goal 5 7.5 5 7.5 5 7.5 5 42.5   11/16/20 3.2 Slightly over goal  5 7.5 5 7.5 5 7.5 5 42.5   11/9/20 2.9 At goal  5 7.5 5 7.5 5 7.5 5 42.5   11/6/20 2.9 At goal 5 7.5 5 7.5 5 7.5 5 42.5   10/26/20 2.3 At goal 5 7.5 5 7.5 5 7.5 5 42.5   10/20/20 3.1 At goal 5 7.5 5 7.5 5 7.5 5 42.5   10/12/20 3.3 Slightly above goal 5 7.5 5 7.5 5 7.5 5 42.5   10/5/20 2.9 At goal 5 7.5 5 7.5 5 7.5 5 42.5   9/28/20 3.2 Slightly above Goal 5 7.5 5 7.5 5 7.5 5 42.5   9/21/20 3.1 At goal 5 7.5 5 7.5 5 7.5 5 32.5   9/9/20 1.8 Below goal 5 7.5 5 7.5 5 7.5 5 32.5   9/7/20 4.0 Over goal 7.5 hold hold 7.5 5 7.5 5 32.5   8/31/20 1.90 Near goal 7.5 7.5 7.5 7.5 7.5 7.5 7.5 52.5   8/24/20 1.30 Below goal 7.5 7.5 7.5 7.5 7.5 7.5 7.5 49   8/17/20 1.5 Below goal 5 10 7.5 5 5 5 5 42.5   8/10/20 1.9 At goal 5 7.5 7.5 5 5 5 5 40   7/27/20 2.1 At goal 5 7.5 7.5 5 5 5 5 40        Assessment/Plan:    Recent hospitalizations/HC visits None reported    Recent medication changes No   Medications taken regularly that may interact with warfarin or alter INR No significant drug interactions identified   Warfarin dose taken as prescribed Yes   Signs/symptoms of bleeding History of bleeding? No   Vitamin K intake Consistency of servings of green, leafy vegetables per week ? Yes   Recent vomiting/diarrhea/fever None reported   Changes in weight, activity, stress None reported   alcohol use Patient reports having 0 drinks per day   Upcoming surgeries or procedures None reported       Patient's INR is at goal-same dosing schedule  Patient was also reminded to maintain consistent vitamin K intake and call with any bleeding, medication changes, or fever/vomiting/diarrhea. Next INR check: 11/30/20    Addendum:  11/23/20  Reviewed assessment and plan. INR is at goal-same dosage instructions  Repeat INR in one week   Patient/family instructed.      Hi Sender, CNP

## 2020-11-30 NOTE — PROGRESS NOTES
ANTICOAGULATION MONITORING    Jp Shabazz, 1940    Anticoagulation Indication(s):  Afib    Referring Physician:   Dr. Cholo Pizarro  Goal INR Range:  2-3    Duration of Anticoagulation Therapy: indefinite  Home or Lab Draw: Franklin Sample  Product patient has at home: warfarin 2 mg    Lab Results   Component Value Date    INR 2.70 11/30/2020    INR 2.30 11/23/2020    INR 3.20 11/16/2020    PROTIME 12.3 12/10/2018    PROTIME 11.5 11/30/2016    PROTIME 10.1 12/18/2014        Component Value Date    INR 2.10 07/27/2020    INR 2.20 07/20/2020    INR 1.40 07/13/2020       INR Summary                          Warfarin regimen (mg)  Date INR A/P Sun Mon Tue Wed Thu Fri Sat Mg/wk                                                                                                                                                    11/30/20 2.7 At goal 5 7.5 5 7.5 5 7.5 5 42.5   11/23/20 2.3 At goal 5 7.5 5 7.5 5 7.5 5 42.5   11/16/20 3.2 Slightly over goal  5 7.5 5 7.5 5 7.5 5 42.5   11/9/20 2.9 At goal  5 7.5 5 7.5 5 7.5 5 42.5   11/6/20 2.9 At goal 5 7.5 5 7.5 5 7.5 5 42.5   10/26/20 2.3 At goal 5 7.5 5 7.5 5 7.5 5 42.5   10/20/20 3.1 At goal 5 7.5 5 7.5 5 7.5 5 42.5   10/12/20 3.3 Slightly above goal 5 7.5 5 7.5 5 7.5 5 42.5   10/5/20 2.9 At goal 5 7.5 5 7.5 5 7.5 5 42.5   9/28/20 3.2 Slightly above Goal 5 7.5 5 7.5 5 7.5 5 42.5   9/21/20 3.1 At goal 5 7.5 5 7.5 5 7.5 5 32.5   9/9/20 1.8 Below goal 5 7.5 5 7.5 5 7.5 5 32.5   9/7/20 4.0 Over goal 7.5 hold hold 7.5 5 7.5 5 32.5   8/31/20 1.90 Near goal 7.5 7.5 7.5 7.5 7.5 7.5 7.5 52.5   8/24/20 1.30 Below goal 7.5 7.5 7.5 7.5 7.5 7.5 7.5 49   8/17/20 1.5 Below goal 5 10 7.5 5 5 5 5 42.5   8/10/20 1.9 At goal 5 7.5 7.5 5 5 5 5 40   7/27/20 2.1 At goal 5 7.5 7.5 5 5 5 5 40        Assessment/Plan:    Recent hospitalizations/HC visits None reported    Recent medication changes No   Medications taken regularly that may interact with warfarin or alter INR No significant drug interactions identified   Warfarin dose taken as prescribed Yes   Signs/symptoms of bleeding History of bleeding? No   Vitamin K intake Consistency of servings of green, leafy vegetables per week ? Yes   Recent vomiting/diarrhea/fever None reported   Changes in weight, activity, stress None reported   alcohol use Patient reports having 0 drinks per day   Upcoming surgeries or procedures None reported       Patient's INR is at goal-same dosing schedule  Patient was also reminded to maintain consistent vitamin K intake and call with any bleeding, medication changes, or fever/vomiting/diarrhea. Next INR check: 12/7/20    Addendum:  11/30/20  Reviewed assessment and plan. INR is at goal-same dosage instructions  Repeat INR in one week   Patient/family instructed.      Lilia Miguel, CNP

## 2020-12-07 NOTE — PROGRESS NOTES
ANTICOAGULATION MONITORING    Srinivasan Reyes, 1940    Anticoagulation Indication(s):  Afib    Referring Physician:   Dr. Sekou Cannon  Goal INR Range:  2-3    Duration of Anticoagulation Therapy: indefinite  Home or Lab Draw: samanthalen Christianson  Product patient has at home: warfarin 2 mg    Lab Results   Component Value Date    INR 2.80 12/07/2020    INR 2.70 11/30/2020    INR 2.30 11/23/2020    PROTIME 12.3 12/10/2018    PROTIME 11.5 11/30/2016    PROTIME 10.1 12/18/2014        Component Value Date    INR 2.10 07/27/2020    INR 2.20 07/20/2020    INR 1.40 07/13/2020       INR Summary                          Warfarin regimen (mg)  Date INR A/P Sun Mon Tue Wed Thu Fri Sat Mg/wk                                                                                                                                       12/7/20 2.8 At goal 5 7.5 5 7.5 5 7.5 5 42.5   11/30/20 2.7 At goal 5 7.5 5 7.5 5 7.5 5 42.5   11/23/20 2.3 At goal 5 7.5 5 7.5 5 7.5 5 42.5   11/16/20 3.2 Slightly over goal  5 7.5 5 7.5 5 7.5 5 42.5   11/9/20 2.9 At goal  5 7.5 5 7.5 5 7.5 5 42.5   11/6/20 2.9 At goal 5 7.5 5 7.5 5 7.5 5 42.5   10/26/20 2.3 At goal 5 7.5 5 7.5 5 7.5 5 42.5   10/20/20 3.1 At goal 5 7.5 5 7.5 5 7.5 5 42.5   10/12/20 3.3 Slightly above goal 5 7.5 5 7.5 5 7.5 5 42.5   10/5/20 2.9 At goal 5 7.5 5 7.5 5 7.5 5 42.5   9/28/20 3.2 Slightly above Goal 5 7.5 5 7.5 5 7.5 5 42.5   9/21/20 3.1 At goal 5 7.5 5 7.5 5 7.5 5 32.5   9/9/20 1.8 Below goal 5 7.5 5 7.5 5 7.5 5 32.5   9/7/20 4.0 Over goal 7.5 hold hold 7.5 5 7.5 5 32.5   8/31/20 1.90 Near goal 7.5 7.5 7.5 7.5 7.5 7.5 7.5 52.5   8/24/20 1.30 Below goal 7.5 7.5 7.5 7.5 7.5 7.5 7.5 49   8/17/20 1.5 Below goal 5 10 7.5 5 5 5 5 42.5   8/10/20 1.9 At goal 5 7.5 7.5 5 5 5 5 40   7/27/20 2.1 At goal 5 7.5 7.5 5 5 5 5 40        Assessment/Plan:    Recent hospitalizations/HC visits None reported    Recent medication changes No   Medications taken regularly that may interact with warfarin or alter INR No

## 2020-12-14 NOTE — PROGRESS NOTES
ANTICOAGULATION MONITORING    Oscar Brenden, 1940    Anticoagulation Indication(s):  Afib    Referring Physician:   Dr. Stock Grand  Goal INR Range:  2-3    Duration of Anticoagulation Therapy: indefinite  Home or Lab Draw: Jimena Osborne  Product patient has at home: warfarin 2 mg    Lab Results   Component Value Date    INR 2.90 12/14/2020    INR 2.80 12/07/2020    INR 2.70 11/30/2020    PROTIME 12.3 12/10/2018    PROTIME 11.5 11/30/2016    PROTIME 10.1 12/18/2014        Component Value Date    INR 2.10 07/27/2020    INR 2.20 07/20/2020    INR 1.40 07/13/2020       INR Summary                          Warfarin regimen (mg)  Date INR A/P Sun Mon Tue Wed Thu Fri Sat Mg/wk                                                                                                                          12/14/20 2.9 At goal 5 7.5 5 7.5 5 7.5 5 42.5   12/7/20 2.8 At goal 5 7.5 5 7.5 5 7.5 5 42.5   11/30/20 2.7 At goal 5 7.5 5 7.5 5 7.5 5 42.5   11/23/20 2.3 At goal 5 7.5 5 7.5 5 7.5 5 42.5   11/16/20 3.2 Slightly over goal  5 7.5 5 7.5 5 7.5 5 42.5   11/9/20 2.9 At goal  5 7.5 5 7.5 5 7.5 5 42.5   11/6/20 2.9 At goal 5 7.5 5 7.5 5 7.5 5 42.5   10/26/20 2.3 At goal 5 7.5 5 7.5 5 7.5 5 42.5   10/20/20 3.1 At goal 5 7.5 5 7.5 5 7.5 5 42.5   10/12/20 3.3 Slightly above goal 5 7.5 5 7.5 5 7.5 5 42.5   10/5/20 2.9 At goal 5 7.5 5 7.5 5 7.5 5 42.5   9/28/20 3.2 Slightly above Goal 5 7.5 5 7.5 5 7.5 5 42.5   9/21/20 3.1 At goal 5 7.5 5 7.5 5 7.5 5 32.5   9/9/20 1.8 Below goal 5 7.5 5 7.5 5 7.5 5 32.5   9/7/20 4.0 Over goal 7.5 hold hold 7.5 5 7.5 5 32.5   8/31/20 1.90 Near goal 7.5 7.5 7.5 7.5 7.5 7.5 7.5 52.5   8/24/20 1.30 Below goal 7.5 7.5 7.5 7.5 7.5 7.5 7.5 49   8/17/20 1.5 Below goal 5 10 7.5 5 5 5 5 42.5   8/10/20 1.9 At goal 5 7.5 7.5 5 5 5 5 40   7/27/20 2.1 At goal 5 7.5 7.5 5 5 5 5 40        Assessment/Plan:    Recent hospitalizations/HC visits None reported    Recent medication changes No Medications taken regularly that may interact with warfarin or alter INR No significant drug interactions identified   Warfarin dose taken as prescribed Yes   Signs/symptoms of bleeding History of bleeding? No   Vitamin K intake Consistency of servings of green, leafy vegetables per week ? Yes   Recent vomiting/diarrhea/fever None reported   Changes in weight, activity, stress None reported   alcohol use Patient reports having 0 drinks per day   Upcoming surgeries or procedures None reported       Patient's INR is at goal-same dosing schedule  Patient was also reminded to maintain consistent vitamin K intake and call with any bleeding, medication changes, or fever/vomiting/diarrhea. Next INR check: 12/21/20    Addendum:  12/14/20  Reviewed assessment and plan. INR is at goal-same dosage instructions  Repeat INR in one week   Patient/family instructed.      Meño Carrion CNP

## 2020-12-15 NOTE — TELEPHONE ENCOUNTER
Received refill request for warfarin from [facility].     Last ov:6/16/2020    Last labs: 12/14/2020 INR,  12/17/2018 CBC    Last Refill:#60 with 0 refills 10/26/2020    Next appointment:1/14/2021

## 2020-12-21 NOTE — PROGRESS NOTES
ANTICOAGULATION MONITORING    Sally Godinez, 1940    Anticoagulation Indication(s):  Afib    Referring Physician:   Dr. Junior Yang  Goal INR Range:  2-3    Duration of Anticoagulation Therapy: indefinite  Home or Lab Draw: Soha Pizarro  Product patient has at home: warfarin 2 mg    Lab Results   Component Value Date    INR 1.80 12/21/2020    INR 2.90 12/14/2020    INR 2.80 12/07/2020    PROTIME 12.3 12/10/2018    PROTIME 11.5 11/30/2016    PROTIME 10.1 12/18/2014        Component Value Date    INR 2.10 07/27/2020    INR 2.20 07/20/2020    INR 1.40 07/13/2020       INR Summary                          Warfarin regimen (mg)  Date INR A/P Sun Mon Tue Wed Thu Fri Sat Mg/wk                                                                                                             12/21/20 1.8 Below goal 5 7.5 7.5 7.5 5 7.5 5 45   12/14/20 2.9 At goal 5 7.5 5 7.5 5 7.5 5 42.5   12/7/20 2.8 At goal 5 7.5 5 7.5 5 7.5 5 42.5   11/30/20 2.7 At goal 5 7.5 5 7.5 5 7.5 5 42.5   11/23/20 2.3 At goal 5 7.5 5 7.5 5 7.5 5 42.5   11/16/20 3.2 Slightly over goal  5 7.5 5 7.5 5 7.5 5 42.5   11/9/20 2.9 At goal  5 7.5 5 7.5 5 7.5 5 42.5   11/6/20 2.9 At goal 5 7.5 5 7.5 5 7.5 5 42.5   10/26/20 2.3 At goal 5 7.5 5 7.5 5 7.5 5 42.5   10/20/20 3.1 At goal 5 7.5 5 7.5 5 7.5 5 42.5   10/12/20 3.3 Slightly above goal 5 7.5 5 7.5 5 7.5 5 42.5   10/5/20 2.9 At goal 5 7.5 5 7.5 5 7.5 5 42.5   9/28/20 3.2 Slightly above Goal 5 7.5 5 7.5 5 7.5 5 42.5   9/21/20 3.1 At goal 5 7.5 5 7.5 5 7.5 5 32.5   9/9/20 1.8 Below goal 5 7.5 5 7.5 5 7.5 5 32.5   9/7/20 4.0 Over goal 7.5 hold hold 7.5 5 7.5 5 32.5   8/31/20 1.90 Near goal 7.5 7.5 7.5 7.5 7.5 7.5 7.5 52.5   8/24/20 1.30 Below goal 7.5 7.5 7.5 7.5 7.5 7.5 7.5 49   8/17/20 1.5 Below goal 5 10 7.5 5 5 5 5 42.5   8/10/20 1.9 At goal 5 7.5 7.5 5 5 5 5 40   7/27/20 2.1 At goal 5 7.5 7.5 5 5 5 5 40        Assessment/Plan:    Recent hospitalizations/HC visits None reported    Recent medication changes No Medications taken regularly that may interact with warfarin or alter INR No significant drug interactions identified   Warfarin dose taken as prescribed Yes   Signs/symptoms of bleeding History of bleeding? No   Vitamin K intake Consistency of servings of green, leafy vegetables per week ? Yes   Recent vomiting/diarrhea/fever None reported   Changes in weight, activity, stress None reported   alcohol use Patient reports having 0 drinks per day   Upcoming surgeries or procedures None reported       Patient's INR is at goal-same dosing schedule  Patient was also reminded to maintain consistent vitamin K intake and call with any bleeding, medication changes, or fever/vomiting/diarrhea. Next INR check: 12/2820    Addendum:  12/21/20  Reviewed assessment and plan. INR is below goal-gave increased dosage instructions  Repeat INR in one week   Patient/family instructed.      Patricio Ferrara, CNP

## 2020-12-28 NOTE — PROGRESS NOTES
ANTICOAGULATION MONITORING    Guadalupe Hood, 1940    Anticoagulation Indication(s):  Afib    Referring Physician:   Dr. Lexie Anaya  Goal INR Range:  2-3    Duration of Anticoagulation Therapy: indefinite  Home or Lab Draw: Jose Manuel De  Product patient has at home: warfarin 2 mg    Lab Results   Component Value Date    INR 3.10 12/28/2020    INR 1.80 12/21/2020    INR 2.90 12/14/2020    PROTIME 12.3 12/10/2018    PROTIME 11.5 11/30/2016    PROTIME 10.1 12/18/2014        Component Value Date    INR 2.10 07/27/2020    INR 2.20 07/20/2020    INR 1.40 07/13/2020       INR Summary                          Warfarin regimen (mg)  Date INR A/P Sun Mon Tue Wed Thu Fri Sat Mg/wk                                                                                                12/28/20 3.1 Just above goal 5 7.5 7.5 7.5 5 7.5 5 45   12/21/20 1.8 Below goal 5 7.5 7.5 7.5 5 7.5 5 45   12/14/20 2.9 At goal 5 7.5 5 7.5 5 7.5 5 42.5   12/7/20 2.8 At goal 5 7.5 5 7.5 5 7.5 5 42.5   11/30/20 2.7 At goal 5 7.5 5 7.5 5 7.5 5 42.5   11/23/20 2.3 At goal 5 7.5 5 7.5 5 7.5 5 42.5   11/16/20 3.2 Slightly over goal  5 7.5 5 7.5 5 7.5 5 42.5   11/9/20 2.9 At goal  5 7.5 5 7.5 5 7.5 5 42.5   11/6/20 2.9 At goal 5 7.5 5 7.5 5 7.5 5 42.5   10/26/20 2.3 At goal 5 7.5 5 7.5 5 7.5 5 42.5   10/20/20 3.1 At goal 5 7.5 5 7.5 5 7.5 5 42.5   10/12/20 3.3 Slightly above goal 5 7.5 5 7.5 5 7.5 5 42.5   10/5/20 2.9 At goal 5 7.5 5 7.5 5 7.5 5 42.5   9/28/20 3.2 Slightly above Goal 5 7.5 5 7.5 5 7.5 5 42.5   9/21/20 3.1 At goal 5 7.5 5 7.5 5 7.5 5 32.5   9/9/20 1.8 Below goal 5 7.5 5 7.5 5 7.5 5 32.5   9/7/20 4.0 Over goal 7.5 hold hold 7.5 5 7.5 5 32.5   8/31/20 1.90 Near goal 7.5 7.5 7.5 7.5 7.5 7.5 7.5 52.5   8/24/20 1.30 Below goal 7.5 7.5 7.5 7.5 7.5 7.5 7.5 49   8/17/20 1.5 Below goal 5 10 7.5 5 5 5 5 42.5   8/10/20 1.9 At goal 5 7.5 7.5 5 5 5 5 40   7/27/20 2.1 At goal 5 7.5 7.5 5 5 5 5 40        Assessment/Plan: Recent hospitalizations/HC visits None reported    Recent medication changes No   Medications taken regularly that may interact with warfarin or alter INR No significant drug interactions identified   Warfarin dose taken as prescribed Yes   Signs/symptoms of bleeding History of bleeding? No   Vitamin K intake Consistency of servings of green, leafy vegetables per week ? Yes   Recent vomiting/diarrhea/fever None reported   Changes in weight, activity, stress None reported   alcohol use Patient reports having 0 drinks per day   Upcoming surgeries or procedures None reported       Patient's INR is at goal-same dosing schedule  Patient was also reminded to maintain consistent vitamin K intake and call with any bleeding, medication changes, or fever/vomiting/diarrhea. Next INR check: 1/4/21    Addendum:  12/28/20  Reviewed assessment and plan. INR is below goal-gave increased dosage instructions  Repeat INR in one week   Patient/family instructed.      Karlee Farrell, CNP

## 2021-01-01 ENCOUNTER — ANTI-COAG VISIT (OUTPATIENT)
Dept: CARDIOLOGY CLINIC | Age: 81
End: 2021-01-01
Payer: MEDICARE

## 2021-01-01 ENCOUNTER — APPOINTMENT (OUTPATIENT)
Dept: CT IMAGING | Age: 81
DRG: 280 | End: 2021-01-01
Payer: MEDICARE

## 2021-01-01 ENCOUNTER — HOSPITAL ENCOUNTER (OUTPATIENT)
Age: 81
Discharge: HOME | End: 2021-01-14
Payer: MEDICARE

## 2021-01-01 ENCOUNTER — TELEPHONE (OUTPATIENT)
Dept: CARDIOLOGY CLINIC | Age: 81
End: 2021-01-01

## 2021-01-01 ENCOUNTER — HOSPITAL ENCOUNTER (INPATIENT)
Age: 81
LOS: 5 days | Discharge: HOSPICE | DRG: 260 | End: 2021-06-04
Attending: EMERGENCY MEDICINE | Admitting: INTERNAL MEDICINE
Payer: MEDICARE

## 2021-01-01 ENCOUNTER — APPOINTMENT (OUTPATIENT)
Dept: GENERAL RADIOLOGY | Age: 81
DRG: 260 | End: 2021-01-01
Payer: MEDICARE

## 2021-01-01 ENCOUNTER — APPOINTMENT (OUTPATIENT)
Dept: CARDIAC CATH/INVASIVE PROCEDURES | Age: 81
DRG: 260 | End: 2021-01-01
Payer: MEDICARE

## 2021-01-01 ENCOUNTER — HOSPITAL ENCOUNTER (INPATIENT)
Age: 81
LOS: 3 days | Discharge: HOME | DRG: 280 | End: 2021-05-28
Attending: EMERGENCY MEDICINE | Admitting: EMERGENCY MEDICINE
Payer: MEDICARE

## 2021-01-01 ENCOUNTER — ANTI-COAG VISIT (OUTPATIENT)
Dept: CARDIOLOGY CLINIC | Age: 81
End: 2021-01-01

## 2021-01-01 ENCOUNTER — OFFICE VISIT (OUTPATIENT)
Dept: CARDIOLOGY CLINIC | Age: 81
End: 2021-01-01
Payer: MEDICARE

## 2021-01-01 ENCOUNTER — APPOINTMENT (OUTPATIENT)
Dept: GENERAL RADIOLOGY | Age: 81
DRG: 280 | End: 2021-01-01
Payer: MEDICARE

## 2021-01-01 VITALS
SYSTOLIC BLOOD PRESSURE: 105 MMHG | BODY MASS INDEX: 40.16 KG/M2 | DIASTOLIC BLOOD PRESSURE: 50 MMHG | HEART RATE: 68 BPM | RESPIRATION RATE: 18 BRPM | TEMPERATURE: 97.2 F | OXYGEN SATURATION: 95 % | HEIGHT: 62 IN | WEIGHT: 218.26 LBS

## 2021-01-01 VITALS
OXYGEN SATURATION: 91 % | RESPIRATION RATE: 17 BRPM | BODY MASS INDEX: 40.77 KG/M2 | WEIGHT: 221.56 LBS | SYSTOLIC BLOOD PRESSURE: 116 MMHG | HEIGHT: 62 IN | TEMPERATURE: 97.7 F | DIASTOLIC BLOOD PRESSURE: 66 MMHG | HEART RATE: 69 BPM

## 2021-01-01 VITALS
HEIGHT: 62 IN | BODY MASS INDEX: 39.47 KG/M2 | OXYGEN SATURATION: 99 % | WEIGHT: 214.5 LBS | DIASTOLIC BLOOD PRESSURE: 52 MMHG | HEART RATE: 62 BPM | SYSTOLIC BLOOD PRESSURE: 108 MMHG

## 2021-01-01 DIAGNOSIS — I48.0 PAROXYSMAL ATRIAL FIBRILLATION (HCC): ICD-10-CM

## 2021-01-01 DIAGNOSIS — R79.1 SUPRATHERAPEUTIC INR: ICD-10-CM

## 2021-01-01 DIAGNOSIS — I65.23 BILATERAL CAROTID ARTERY STENOSIS: ICD-10-CM

## 2021-01-01 DIAGNOSIS — R53.83 OTHER FATIGUE: ICD-10-CM

## 2021-01-01 DIAGNOSIS — E78.2 MIXED HYPERLIPIDEMIA: Chronic | ICD-10-CM

## 2021-01-01 DIAGNOSIS — I48.0 PAROXYSMAL ATRIAL FIBRILLATION (HCC): Primary | ICD-10-CM

## 2021-01-01 DIAGNOSIS — I50.32 CHRONIC DIASTOLIC HEART FAILURE (HCC): ICD-10-CM

## 2021-01-01 DIAGNOSIS — R06.09 DOE (DYSPNEA ON EXERTION): ICD-10-CM

## 2021-01-01 DIAGNOSIS — I50.23 ACUTE ON CHRONIC CLINICAL SYSTOLIC HEART FAILURE (HCC): ICD-10-CM

## 2021-01-01 DIAGNOSIS — E87.5 HYPERKALEMIA: ICD-10-CM

## 2021-01-01 DIAGNOSIS — I38 HEART VALVE REGURGITATION: ICD-10-CM

## 2021-01-01 DIAGNOSIS — J81.0 ACUTE PULMONARY EDEMA (HCC): Primary | ICD-10-CM

## 2021-01-01 DIAGNOSIS — R55 SYNCOPE AND COLLAPSE: ICD-10-CM

## 2021-01-01 DIAGNOSIS — I21.4 NSTEMI (NON-ST ELEVATED MYOCARDIAL INFARCTION) (HCC): Primary | ICD-10-CM

## 2021-01-01 LAB
A/G RATIO: 1.4 (ref 1.1–2.2)
A/G RATIO: 1.4 (ref 1.1–2.2)
ALBUMIN SERPL-MCNC: 3.4 G/DL (ref 3.4–5)
ALBUMIN SERPL-MCNC: 3.7 G/DL (ref 3.4–5)
ALBUMIN SERPL-MCNC: 3.7 G/DL (ref 3.4–5)
ALBUMIN SERPL-MCNC: 3.9 G/DL (ref 3.4–5)
ALP BLD-CCNC: 130 U/L (ref 40–129)
ALP BLD-CCNC: 140 U/L (ref 40–129)
ALP BLD-CCNC: 146 U/L (ref 40–129)
ALT SERPL-CCNC: 19 U/L (ref 10–40)
ALT SERPL-CCNC: 24 U/L (ref 10–40)
ALT SERPL-CCNC: 33 U/L (ref 10–40)
ANION GAP SERPL CALCULATED.3IONS-SCNC: 12 MMOL/L (ref 3–16)
ANION GAP SERPL CALCULATED.3IONS-SCNC: 13 MMOL/L (ref 3–16)
ANION GAP SERPL CALCULATED.3IONS-SCNC: 14 MMOL/L (ref 3–16)
ANION GAP SERPL CALCULATED.3IONS-SCNC: 15 MMOL/L (ref 3–16)
ANION GAP SERPL CALCULATED.3IONS-SCNC: 16 MMOL/L (ref 3–16)
ANION GAP SERPL CALCULATED.3IONS-SCNC: 19 MMOL/L (ref 3–16)
APTT: 119.7 SEC (ref 24.2–36.2)
APTT: 171.6 SEC (ref 24.2–36.2)
APTT: 40.3 SEC (ref 24.2–36.2)
APTT: 41.9 SEC (ref 24.2–36.2)
APTT: 88.5 SEC (ref 24.2–36.2)
AST SERPL-CCNC: 15 U/L (ref 15–37)
AST SERPL-CCNC: 23 U/L (ref 15–37)
AST SERPL-CCNC: 26 U/L (ref 15–37)
BASE EXCESS ARTERIAL: -0.7 MMOL/L (ref -3–3)
BASE EXCESS ARTERIAL: -1.8 MMOL/L (ref -3–3)
BASOPHILS ABSOLUTE: 0 K/UL (ref 0–0.2)
BASOPHILS ABSOLUTE: 0 K/UL (ref 0–0.2)
BASOPHILS ABSOLUTE: 0.1 K/UL (ref 0–0.2)
BASOPHILS RELATIVE PERCENT: 0.5 %
BASOPHILS RELATIVE PERCENT: 0.5 %
BASOPHILS RELATIVE PERCENT: 0.6 %
BILIRUB SERPL-MCNC: 0.5 MG/DL (ref 0–1)
BILIRUB SERPL-MCNC: 0.7 MG/DL (ref 0–1)
BILIRUB SERPL-MCNC: 0.8 MG/DL (ref 0–1)
BILIRUBIN DIRECT: 0.3 MG/DL (ref 0–0.3)
BILIRUBIN, INDIRECT: 0.4 MG/DL (ref 0–1)
BUN BLDV-MCNC: 16 MG/DL (ref 7–20)
BUN BLDV-MCNC: 17 MG/DL (ref 7–20)
BUN BLDV-MCNC: 17 MG/DL (ref 7–20)
BUN BLDV-MCNC: 21 MG/DL (ref 7–20)
BUN BLDV-MCNC: 22 MG/DL (ref 7–20)
BUN BLDV-MCNC: 23 MG/DL (ref 7–20)
BUN BLDV-MCNC: 23 MG/DL (ref 7–20)
BUN BLDV-MCNC: 30 MG/DL (ref 7–20)
CALCIUM SERPL-MCNC: 10 MG/DL (ref 8.3–10.6)
CALCIUM SERPL-MCNC: 10.2 MG/DL (ref 8.3–10.6)
CALCIUM SERPL-MCNC: 8.4 MG/DL (ref 8.3–10.6)
CALCIUM SERPL-MCNC: 9 MG/DL (ref 8.3–10.6)
CALCIUM SERPL-MCNC: 9.4 MG/DL (ref 8.3–10.6)
CALCIUM SERPL-MCNC: 9.4 MG/DL (ref 8.3–10.6)
CALCIUM SERPL-MCNC: 9.8 MG/DL (ref 8.3–10.6)
CALCIUM SERPL-MCNC: 9.9 MG/DL (ref 8.3–10.6)
CARBOXYHEMOGLOBIN ARTERIAL: 1.1 % (ref 0–1.5)
CARBOXYHEMOGLOBIN ARTERIAL: 1.2 % (ref 0–1.5)
CHLORIDE BLD-SCNC: 90 MMOL/L (ref 99–110)
CHLORIDE BLD-SCNC: 92 MMOL/L (ref 99–110)
CHLORIDE BLD-SCNC: 93 MMOL/L (ref 99–110)
CHLORIDE BLD-SCNC: 94 MMOL/L (ref 99–110)
CHOLESTEROL, FASTING: 151 MG/DL (ref 0–199)
CO2: 17 MMOL/L (ref 21–32)
CO2: 20 MMOL/L (ref 21–32)
CO2: 24 MMOL/L (ref 21–32)
CO2: 24 MMOL/L (ref 21–32)
CO2: 25 MMOL/L (ref 21–32)
CO2: 26 MMOL/L (ref 21–32)
CO2: 26 MMOL/L (ref 21–32)
CO2: 29 MMOL/L (ref 21–32)
CREAT SERPL-MCNC: 4.4 MG/DL (ref 0.6–1.2)
CREAT SERPL-MCNC: 4.6 MG/DL (ref 0.6–1.2)
CREAT SERPL-MCNC: 4.6 MG/DL (ref 0.6–1.2)
CREAT SERPL-MCNC: 4.7 MG/DL (ref 0.6–1.2)
CREAT SERPL-MCNC: 5.4 MG/DL (ref 0.6–1.2)
CREAT SERPL-MCNC: 6.1 MG/DL (ref 0.6–1.2)
CREAT SERPL-MCNC: 6.1 MG/DL (ref 0.6–1.2)
CREAT SERPL-MCNC: 6.4 MG/DL (ref 0.6–1.2)
EKG ATRIAL RATE: 53 BPM
EKG ATRIAL RATE: 57 BPM
EKG ATRIAL RATE: 57 BPM
EKG ATRIAL RATE: 72 BPM
EKG ATRIAL RATE: 73 BPM
EKG ATRIAL RATE: 83 BPM
EKG DIAGNOSIS: NORMAL
EKG P AXIS: -2 DEGREES
EKG P AXIS: -42 DEGREES
EKG P AXIS: 22 DEGREES
EKG P AXIS: 24 DEGREES
EKG P AXIS: 46 DEGREES
EKG P-R INTERVAL: 142 MS
EKG P-R INTERVAL: 152 MS
EKG P-R INTERVAL: 158 MS
EKG P-R INTERVAL: 162 MS
EKG P-R INTERVAL: 174 MS
EKG Q-T INTERVAL: 408 MS
EKG Q-T INTERVAL: 426 MS
EKG Q-T INTERVAL: 456 MS
EKG Q-T INTERVAL: 478 MS
EKG Q-T INTERVAL: 480 MS
EKG Q-T INTERVAL: 496 MS
EKG QRS DURATION: 198 MS
EKG QRS DURATION: 78 MS
EKG QRS DURATION: 78 MS
EKG QRS DURATION: 80 MS
EKG QRS DURATION: 86 MS
EKG QRS DURATION: 88 MS
EKG QTC CALCULATION (BAZETT): 449 MS
EKG QTC CALCULATION (BAZETT): 465 MS
EKG QTC CALCULATION (BAZETT): 465 MS
EKG QTC CALCULATION (BAZETT): 466 MS
EKG QTC CALCULATION (BAZETT): 467 MS
EKG QTC CALCULATION (BAZETT): 628 MS
EKG R AXIS: 22 DEGREES
EKG R AXIS: 247 DEGREES
EKG R AXIS: 28 DEGREES
EKG R AXIS: 29 DEGREES
EKG R AXIS: 4 DEGREES
EKG R AXIS: 9 DEGREES
EKG T AXIS: 197 DEGREES
EKG T AXIS: 199 DEGREES
EKG T AXIS: 206 DEGREES
EKG T AXIS: 219 DEGREES
EKG T AXIS: 221 DEGREES
EKG T AXIS: 43 DEGREES
EKG VENTRICULAR RATE: 114 BPM
EKG VENTRICULAR RATE: 53 BPM
EKG VENTRICULAR RATE: 57 BPM
EKG VENTRICULAR RATE: 57 BPM
EKG VENTRICULAR RATE: 72 BPM
EKG VENTRICULAR RATE: 73 BPM
EOSINOPHILS ABSOLUTE: 0.1 K/UL (ref 0–0.6)
EOSINOPHILS ABSOLUTE: 0.1 K/UL (ref 0–0.6)
EOSINOPHILS ABSOLUTE: 0.2 K/UL (ref 0–0.6)
EOSINOPHILS RELATIVE PERCENT: 0.7 %
EOSINOPHILS RELATIVE PERCENT: 1.7 %
EOSINOPHILS RELATIVE PERCENT: 2 %
ESTIMATED AVERAGE GLUCOSE: 188.6 MG/DL
GFR AFRICAN AMERICAN: 11
GFR AFRICAN AMERICAN: 12
GFR AFRICAN AMERICAN: 8
GFR AFRICAN AMERICAN: 9
GFR NON-AFRICAN AMERICAN: 10
GFR NON-AFRICAN AMERICAN: 6
GFR NON-AFRICAN AMERICAN: 7
GFR NON-AFRICAN AMERICAN: 7
GFR NON-AFRICAN AMERICAN: 8
GFR NON-AFRICAN AMERICAN: 9
GLOBULIN: 2.7 G/DL
GLOBULIN: 2.8 G/DL
GLUCOSE BLD-MCNC: 104 MG/DL (ref 70–99)
GLUCOSE BLD-MCNC: 108 MG/DL (ref 70–99)
GLUCOSE BLD-MCNC: 119 MG/DL (ref 70–99)
GLUCOSE BLD-MCNC: 121 MG/DL (ref 70–99)
GLUCOSE BLD-MCNC: 130 MG/DL (ref 70–99)
GLUCOSE BLD-MCNC: 131 MG/DL (ref 70–99)
GLUCOSE BLD-MCNC: 133 MG/DL (ref 70–99)
GLUCOSE BLD-MCNC: 133 MG/DL (ref 70–99)
GLUCOSE BLD-MCNC: 134 MG/DL (ref 70–99)
GLUCOSE BLD-MCNC: 138 MG/DL (ref 70–99)
GLUCOSE BLD-MCNC: 146 MG/DL (ref 70–99)
GLUCOSE BLD-MCNC: 149 MG/DL (ref 70–99)
GLUCOSE BLD-MCNC: 156 MG/DL (ref 70–99)
GLUCOSE BLD-MCNC: 159 MG/DL (ref 70–99)
GLUCOSE BLD-MCNC: 162 MG/DL (ref 70–99)
GLUCOSE BLD-MCNC: 171 MG/DL (ref 70–99)
GLUCOSE BLD-MCNC: 176 MG/DL (ref 70–99)
GLUCOSE BLD-MCNC: 181 MG/DL (ref 70–99)
GLUCOSE BLD-MCNC: 182 MG/DL (ref 70–99)
GLUCOSE BLD-MCNC: 192 MG/DL (ref 70–99)
GLUCOSE BLD-MCNC: 199 MG/DL (ref 70–99)
GLUCOSE BLD-MCNC: 199 MG/DL (ref 70–99)
GLUCOSE BLD-MCNC: 200 MG/DL (ref 70–99)
GLUCOSE BLD-MCNC: 205 MG/DL (ref 70–99)
GLUCOSE BLD-MCNC: 205 MG/DL (ref 70–99)
GLUCOSE BLD-MCNC: 209 MG/DL (ref 70–99)
GLUCOSE BLD-MCNC: 215 MG/DL (ref 70–99)
GLUCOSE BLD-MCNC: 218 MG/DL (ref 70–99)
GLUCOSE BLD-MCNC: 221 MG/DL (ref 70–99)
GLUCOSE BLD-MCNC: 224 MG/DL (ref 70–99)
GLUCOSE BLD-MCNC: 263 MG/DL (ref 70–99)
GLUCOSE BLD-MCNC: 272 MG/DL (ref 70–99)
GLUCOSE BLD-MCNC: 48 MG/DL (ref 70–99)
GLUCOSE BLD-MCNC: 53 MG/DL (ref 70–99)
GLUCOSE BLD-MCNC: 65 MG/DL (ref 70–99)
GLUCOSE BLD-MCNC: 71 MG/DL (ref 70–99)
GLUCOSE BLD-MCNC: 72 MG/DL (ref 70–99)
GLUCOSE BLD-MCNC: 85 MG/DL (ref 70–99)
GLUCOSE BLD-MCNC: 87 MG/DL (ref 70–99)
GLUCOSE BLD-MCNC: 95 MG/DL (ref 70–99)
GLUCOSE BLD-MCNC: 97 MG/DL (ref 70–99)
GLUCOSE BLD-MCNC: 98 MG/DL (ref 70–99)
HBA1C MFR BLD: 8.2 %
HCO3 ARTERIAL: 22 MMOL/L (ref 21–29)
HCO3 ARTERIAL: 25 MMOL/L (ref 21–29)
HCT VFR BLD CALC: 32.6 % (ref 36–48)
HCT VFR BLD CALC: 34 % (ref 36–48)
HCT VFR BLD CALC: 34 % (ref 36–48)
HCT VFR BLD CALC: 34.7 % (ref 36–48)
HCT VFR BLD CALC: 35.4 % (ref 36–48)
HCT VFR BLD CALC: 36.7 % (ref 36–48)
HCT VFR BLD CALC: 36.7 % (ref 36–48)
HCT VFR BLD CALC: 38.1 % (ref 36–48)
HDLC SERPL-MCNC: 58 MG/DL (ref 40–60)
HEMOGLOBIN, ART, EXTENDED: 11.1 G/DL (ref 12–16)
HEMOGLOBIN, ART, EXTENDED: 11.8 G/DL (ref 12–16)
HEMOGLOBIN: 10.9 G/DL (ref 12–16)
HEMOGLOBIN: 11.3 G/DL (ref 12–16)
HEMOGLOBIN: 11.4 G/DL (ref 12–16)
HEMOGLOBIN: 11.4 G/DL (ref 12–16)
HEMOGLOBIN: 11.9 G/DL (ref 12–16)
HEMOGLOBIN: 12.1 G/DL (ref 12–16)
HEMOGLOBIN: 12.4 G/DL (ref 12–16)
HEMOGLOBIN: 12.7 G/DL (ref 12–16)
INR BLD: 1.11 (ref 0.86–1.14)
INR BLD: 1.19 (ref 0.86–1.14)
INR BLD: 1.37 (ref 0.86–1.14)
INR BLD: 1.47 (ref 0.86–1.14)
INR BLD: 1.5
INR BLD: 1.6 (ref 0.86–1.14)
INR BLD: 1.9
INR BLD: 2
INR BLD: 2
INR BLD: 2.3
INR BLD: 2.4
INR BLD: 2.4
INR BLD: 2.5
INR BLD: 2.6
INR BLD: 2.8
INR BLD: 3
INR BLD: 3.2
INR BLD: 3.3
INR BLD: 3.5
INR BLD: 3.7
INR BLD: 3.8
INR BLD: 4
INR BLD: 4.11 (ref 0.86–1.14)
LDL CHOLESTEROL CALCULATED: 44 MG/DL
LV EF: 40 %
LVEF MODALITY: NORMAL
LYMPHOCYTES ABSOLUTE: 1.3 K/UL (ref 1–5.1)
LYMPHOCYTES ABSOLUTE: 1.4 K/UL (ref 1–5.1)
LYMPHOCYTES ABSOLUTE: 1.5 K/UL (ref 1–5.1)
LYMPHOCYTES RELATIVE PERCENT: 13.7 %
LYMPHOCYTES RELATIVE PERCENT: 14.9 %
LYMPHOCYTES RELATIVE PERCENT: 17.6 %
MAGNESIUM: 1.8 MG/DL (ref 1.8–2.4)
MCH RBC QN AUTO: 33.6 PG (ref 26–34)
MCH RBC QN AUTO: 33.8 PG (ref 26–34)
MCH RBC QN AUTO: 34.1 PG (ref 26–34)
MCH RBC QN AUTO: 34.1 PG (ref 26–34)
MCH RBC QN AUTO: 34.2 PG (ref 26–34)
MCH RBC QN AUTO: 34.4 PG (ref 26–34)
MCH RBC QN AUTO: 34.4 PG (ref 26–34)
MCH RBC QN AUTO: 34.7 PG (ref 26–34)
MCHC RBC AUTO-ENTMCNC: 33 G/DL (ref 31–36)
MCHC RBC AUTO-ENTMCNC: 33.1 G/DL (ref 31–36)
MCHC RBC AUTO-ENTMCNC: 33.2 G/DL (ref 31–36)
MCHC RBC AUTO-ENTMCNC: 33.2 G/DL (ref 31–36)
MCHC RBC AUTO-ENTMCNC: 33.5 G/DL (ref 31–36)
MCHC RBC AUTO-ENTMCNC: 33.6 G/DL (ref 31–36)
MCHC RBC AUTO-ENTMCNC: 33.7 G/DL (ref 31–36)
MCHC RBC AUTO-ENTMCNC: 33.9 G/DL (ref 31–36)
MCV RBC AUTO: 101.5 FL (ref 80–100)
MCV RBC AUTO: 101.6 FL (ref 80–100)
MCV RBC AUTO: 101.6 FL (ref 80–100)
MCV RBC AUTO: 101.8 FL (ref 80–100)
MCV RBC AUTO: 102 FL (ref 80–100)
MCV RBC AUTO: 102.9 FL (ref 80–100)
MCV RBC AUTO: 103.5 FL (ref 80–100)
MCV RBC AUTO: 103.6 FL (ref 80–100)
METHEMOGLOBIN ARTERIAL: 0.4 %
METHEMOGLOBIN ARTERIAL: 0.5 %
MONOCYTES ABSOLUTE: 0.8 K/UL (ref 0–1.3)
MONOCYTES ABSOLUTE: 0.8 K/UL (ref 0–1.3)
MONOCYTES ABSOLUTE: 0.9 K/UL (ref 0–1.3)
MONOCYTES RELATIVE PERCENT: 10.2 %
MONOCYTES RELATIVE PERCENT: 8.3 %
MONOCYTES RELATIVE PERCENT: 9.2 %
NEUTROPHILS ABSOLUTE: 5.6 K/UL (ref 1.7–7.7)
NEUTROPHILS ABSOLUTE: 7.2 K/UL (ref 1.7–7.7)
NEUTROPHILS ABSOLUTE: 7.2 K/UL (ref 1.7–7.7)
NEUTROPHILS RELATIVE PERCENT: 69.9 %
NEUTROPHILS RELATIVE PERCENT: 73.4 %
NEUTROPHILS RELATIVE PERCENT: 76.8 %
O2 CONTENT ARTERIAL: 13 ML/DL
O2 CONTENT ARTERIAL: 16 ML/DL
O2 SAT, ARTERIAL: 84.8 %
O2 SAT, ARTERIAL: 99 %
O2 THERAPY: ABNORMAL
O2 THERAPY: ABNORMAL
PCO2 ARTERIAL: 33.5 MMHG (ref 35–45)
PCO2 ARTERIAL: 44.7 MMHG (ref 35–45)
PDW BLD-RTO: 14.8 % (ref 12.4–15.4)
PDW BLD-RTO: 15 % (ref 12.4–15.4)
PDW BLD-RTO: 15.3 % (ref 12.4–15.4)
PDW BLD-RTO: 15.4 % (ref 12.4–15.4)
PDW BLD-RTO: 15.5 % (ref 12.4–15.4)
PDW BLD-RTO: 15.7 % (ref 12.4–15.4)
PERFORMED ON: ABNORMAL
PERFORMED ON: NORMAL
PH ARTERIAL: 7.36 (ref 7.35–7.45)
PH ARTERIAL: 7.43 (ref 7.35–7.45)
PHOSPHORUS: 3.8 MG/DL (ref 2.5–4.9)
PHOSPHORUS: 3.9 MG/DL (ref 2.5–4.9)
PHOSPHORUS: 4 MG/DL (ref 2.5–4.9)
PLATELET # BLD: 122 K/UL (ref 135–450)
PLATELET # BLD: 123 K/UL (ref 135–450)
PLATELET # BLD: 126 K/UL (ref 135–450)
PLATELET # BLD: 129 K/UL (ref 135–450)
PLATELET # BLD: 138 K/UL (ref 135–450)
PLATELET # BLD: 169 K/UL (ref 135–450)
PLATELET # BLD: 173 K/UL (ref 135–450)
PLATELET # BLD: 210 K/UL (ref 135–450)
PMV BLD AUTO: 8.6 FL (ref 5–10.5)
PMV BLD AUTO: 8.7 FL (ref 5–10.5)
PMV BLD AUTO: 8.8 FL (ref 5–10.5)
PMV BLD AUTO: 9.1 FL (ref 5–10.5)
PMV BLD AUTO: 9.3 FL (ref 5–10.5)
PMV BLD AUTO: 9.3 FL (ref 5–10.5)
PO2 ARTERIAL: 111 MMHG (ref 75–108)
PO2 ARTERIAL: 53.6 MMHG (ref 75–108)
POTASSIUM REFLEX MAGNESIUM: 4.1 MMOL/L (ref 3.5–5.1)
POTASSIUM REFLEX MAGNESIUM: 4.3 MMOL/L (ref 3.5–5.1)
POTASSIUM REFLEX MAGNESIUM: 4.9 MMOL/L (ref 3.5–5.1)
POTASSIUM REFLEX MAGNESIUM: 6.1 MMOL/L (ref 3.5–5.1)
POTASSIUM SERPL-SCNC: 4.1 MMOL/L (ref 3.5–5.1)
POTASSIUM SERPL-SCNC: 4.6 MMOL/L (ref 3.5–5.1)
POTASSIUM SERPL-SCNC: 5 MMOL/L (ref 3.5–5.1)
POTASSIUM SERPL-SCNC: 5.5 MMOL/L (ref 3.5–5.1)
PRO-BNP: ABNORMAL PG/ML (ref 0–449)
PROTHROMBIN TIME: 12.9 SEC (ref 10–13.2)
PROTHROMBIN TIME: 13.8 SEC (ref 10–13.2)
PROTHROMBIN TIME: 16 SEC (ref 10–13.2)
PROTHROMBIN TIME: 17.1 SEC (ref 10–13.2)
PROTHROMBIN TIME: 18.7 SEC (ref 10–13.2)
PROTHROMBIN TIME: 48.4 SEC (ref 10–13.2)
RBC # BLD: 3.19 M/UL (ref 4–5.2)
RBC # BLD: 3.34 M/UL (ref 4–5.2)
RBC # BLD: 3.34 M/UL (ref 4–5.2)
RBC # BLD: 3.36 M/UL (ref 4–5.2)
RBC # BLD: 3.44 M/UL (ref 4–5.2)
RBC # BLD: 3.61 M/UL (ref 4–5.2)
RBC # BLD: 3.61 M/UL (ref 4–5.2)
RBC # BLD: 3.68 M/UL (ref 4–5.2)
SODIUM BLD-SCNC: 129 MMOL/L (ref 136–145)
SODIUM BLD-SCNC: 131 MMOL/L (ref 136–145)
SODIUM BLD-SCNC: 131 MMOL/L (ref 136–145)
SODIUM BLD-SCNC: 132 MMOL/L (ref 136–145)
SODIUM BLD-SCNC: 133 MMOL/L (ref 136–145)
SODIUM BLD-SCNC: 133 MMOL/L (ref 136–145)
TCO2 ARTERIAL: 23 MMOL/L
TCO2 ARTERIAL: 26.4 MMOL/L
TOTAL PROTEIN: 6.4 G/DL (ref 6.4–8.2)
TOTAL PROTEIN: 6.4 G/DL (ref 6.4–8.2)
TOTAL PROTEIN: 6.7 G/DL (ref 6.4–8.2)
TRIGLYCERIDE, FASTING: 245 MG/DL (ref 0–150)
TROPONIN: 1.06 NG/ML
TROPONIN: 1.55 NG/ML
TROPONIN: 1.66 NG/ML
TROPONIN: 1.71 NG/ML
TSH SERPL DL<=0.05 MIU/L-ACNC: 3.04 UIU/ML (ref 0.27–4.2)
VLDLC SERPL CALC-MCNC: 49 MG/DL
WBC # BLD: 10.6 K/UL (ref 4–11)
WBC # BLD: 8 K/UL (ref 4–11)
WBC # BLD: 8.3 K/UL (ref 4–11)
WBC # BLD: 8.7 K/UL (ref 4–11)
WBC # BLD: 8.7 K/UL (ref 4–11)
WBC # BLD: 8.8 K/UL (ref 4–11)
WBC # BLD: 9.3 K/UL (ref 4–11)
WBC # BLD: 9.8 K/UL (ref 4–11)

## 2021-01-01 PROCEDURE — 90935 HEMODIALYSIS ONE EVALUATION: CPT

## 2021-01-01 PROCEDURE — 93793 ANTICOAG MGMT PT WARFARIN: CPT | Performed by: NURSE PRACTITIONER

## 2021-01-01 PROCEDURE — 6370000000 HC RX 637 (ALT 250 FOR IP): Performed by: INTERNAL MEDICINE

## 2021-01-01 PROCEDURE — 99233 SBSQ HOSP IP/OBS HIGH 50: CPT | Performed by: INTERNAL MEDICINE

## 2021-01-01 PROCEDURE — 80069 RENAL FUNCTION PANEL: CPT

## 2021-01-01 PROCEDURE — 93010 ELECTROCARDIOGRAM REPORT: CPT | Performed by: INTERNAL MEDICINE

## 2021-01-01 PROCEDURE — 93005 ELECTROCARDIOGRAM TRACING: CPT | Performed by: EMERGENCY MEDICINE

## 2021-01-01 PROCEDURE — 85027 COMPLETE CBC AUTOMATED: CPT

## 2021-01-01 PROCEDURE — 85025 COMPLETE CBC W/AUTO DIFF WBC: CPT

## 2021-01-01 PROCEDURE — 2700000000 HC OXYGEN THERAPY PER DAY

## 2021-01-01 PROCEDURE — 6360000004 HC RX CONTRAST MEDICATION: Performed by: INTERNAL MEDICINE

## 2021-01-01 PROCEDURE — 2100000000 HC CCU R&B

## 2021-01-01 PROCEDURE — 1036F TOBACCO NON-USER: CPT | Performed by: NURSE PRACTITIONER

## 2021-01-01 PROCEDURE — 2580000003 HC RX 258: Performed by: EMERGENCY MEDICINE

## 2021-01-01 PROCEDURE — 4040F PNEUMOC VAC/ADMIN/RCVD: CPT | Performed by: NURSE PRACTITIONER

## 2021-01-01 PROCEDURE — G8427 DOCREV CUR MEDS BY ELIG CLIN: HCPCS | Performed by: NURSE PRACTITIONER

## 2021-01-01 PROCEDURE — 97116 GAIT TRAINING THERAPY: CPT | Performed by: PHYSICAL THERAPIST

## 2021-01-01 PROCEDURE — 2060000000 HC ICU INTERMEDIATE R&B

## 2021-01-01 PROCEDURE — 93005 ELECTROCARDIOGRAM TRACING: CPT | Performed by: INTERNAL MEDICINE

## 2021-01-01 PROCEDURE — 94761 N-INVAS EAR/PLS OXIMETRY MLT: CPT

## 2021-01-01 PROCEDURE — 6360000002 HC RX W HCPCS: Performed by: PHYSICIAN ASSISTANT

## 2021-01-01 PROCEDURE — P9047 ALBUMIN (HUMAN), 25%, 50ML: HCPCS | Performed by: INTERNAL MEDICINE

## 2021-01-01 PROCEDURE — 94760 N-INVAS EAR/PLS OXIMETRY 1: CPT

## 2021-01-01 PROCEDURE — 6370000000 HC RX 637 (ALT 250 FOR IP): Performed by: NURSE PRACTITIONER

## 2021-01-01 PROCEDURE — 97162 PT EVAL MOD COMPLEX 30 MIN: CPT

## 2021-01-01 PROCEDURE — 36415 COLL VENOUS BLD VENIPUNCTURE: CPT

## 2021-01-01 PROCEDURE — 99291 CRITICAL CARE FIRST HOUR: CPT | Performed by: INTERNAL MEDICINE

## 2021-01-01 PROCEDURE — 2500000003 HC RX 250 WO HCPCS

## 2021-01-01 PROCEDURE — 99284 EMERGENCY DEPT VISIT MOD MDM: CPT

## 2021-01-01 PROCEDURE — 84443 ASSAY THYROID STIM HORMONE: CPT

## 2021-01-01 PROCEDURE — 6360000002 HC RX W HCPCS: Performed by: INTERNAL MEDICINE

## 2021-01-01 PROCEDURE — 97530 THERAPEUTIC ACTIVITIES: CPT

## 2021-01-01 PROCEDURE — C1769 GUIDE WIRE: HCPCS

## 2021-01-01 PROCEDURE — 99222 1ST HOSP IP/OBS MODERATE 55: CPT | Performed by: INTERNAL MEDICINE

## 2021-01-01 PROCEDURE — 80053 COMPREHEN METABOLIC PANEL: CPT

## 2021-01-01 PROCEDURE — 93306 TTE W/DOPPLER COMPLETE: CPT

## 2021-01-01 PROCEDURE — 2580000003 HC RX 258: Performed by: INTERNAL MEDICINE

## 2021-01-01 PROCEDURE — 84484 ASSAY OF TROPONIN QUANT: CPT

## 2021-01-01 PROCEDURE — 2709999900 HC NON-CHARGEABLE SUPPLY

## 2021-01-01 PROCEDURE — 85730 THROMBOPLASTIN TIME PARTIAL: CPT

## 2021-01-01 PROCEDURE — 82803 BLOOD GASES ANY COMBINATION: CPT

## 2021-01-01 PROCEDURE — 97166 OT EVAL MOD COMPLEX 45 MIN: CPT

## 2021-01-01 PROCEDURE — 02HK3JZ INSERTION OF PACEMAKER LEAD INTO RIGHT VENTRICLE, PERCUTANEOUS APPROACH: ICD-10-PCS | Performed by: INTERNAL MEDICINE

## 2021-01-01 PROCEDURE — 97530 THERAPEUTIC ACTIVITIES: CPT | Performed by: PHYSICAL THERAPIST

## 2021-01-01 PROCEDURE — 6370000000 HC RX 637 (ALT 250 FOR IP): Performed by: EMERGENCY MEDICINE

## 2021-01-01 PROCEDURE — 99153 MOD SED SAME PHYS/QHP EA: CPT

## 2021-01-01 PROCEDURE — 97116 GAIT TRAINING THERAPY: CPT

## 2021-01-01 PROCEDURE — G8400 PT W/DXA NO RESULTS DOC: HCPCS | Performed by: NURSE PRACTITIONER

## 2021-01-01 PROCEDURE — 99223 1ST HOSP IP/OBS HIGH 75: CPT | Performed by: INTERNAL MEDICINE

## 2021-01-01 PROCEDURE — 83880 ASSAY OF NATRIURETIC PEPTIDE: CPT

## 2021-01-01 PROCEDURE — 84100 ASSAY OF PHOSPHORUS: CPT

## 2021-01-01 PROCEDURE — 5A1D70Z PERFORMANCE OF URINARY FILTRATION, INTERMITTENT, LESS THAN 6 HOURS PER DAY: ICD-10-PCS | Performed by: EMERGENCY MEDICINE

## 2021-01-01 PROCEDURE — 83036 HEMOGLOBIN GLYCOSYLATED A1C: CPT

## 2021-01-01 PROCEDURE — 85610 PROTHROMBIN TIME: CPT

## 2021-01-01 PROCEDURE — 99232 SBSQ HOSP IP/OBS MODERATE 35: CPT | Performed by: INTERNAL MEDICINE

## 2021-01-01 PROCEDURE — G8417 CALC BMI ABV UP PARAM F/U: HCPCS | Performed by: NURSE PRACTITIONER

## 2021-01-01 PROCEDURE — 99214 OFFICE O/P EST MOD 30 MIN: CPT | Performed by: NURSE PRACTITIONER

## 2021-01-01 PROCEDURE — 1090F PRES/ABSN URINE INCON ASSESS: CPT | Performed by: NURSE PRACTITIONER

## 2021-01-01 PROCEDURE — 93000 ELECTROCARDIOGRAM COMPLETE: CPT | Performed by: NURSE PRACTITIONER

## 2021-01-01 PROCEDURE — G8484 FLU IMMUNIZE NO ADMIN: HCPCS | Performed by: NURSE PRACTITIONER

## 2021-01-01 PROCEDURE — 5A1D70Z PERFORMANCE OF URINARY FILTRATION, INTERMITTENT, LESS THAN 6 HOURS PER DAY: ICD-10-PCS | Performed by: INTERNAL MEDICINE

## 2021-01-01 PROCEDURE — 99285 EMERGENCY DEPT VISIT HI MDM: CPT

## 2021-01-01 PROCEDURE — 83735 ASSAY OF MAGNESIUM: CPT

## 2021-01-01 PROCEDURE — 93308 TTE F-UP OR LMTD: CPT

## 2021-01-01 PROCEDURE — 93455 CORONARY ART/GRFT ANGIO S&I: CPT | Performed by: INTERNAL MEDICINE

## 2021-01-01 PROCEDURE — 80048 BASIC METABOLIC PNL TOTAL CA: CPT

## 2021-01-01 PROCEDURE — 93455 CORONARY ART/GRFT ANGIO S&I: CPT

## 2021-01-01 PROCEDURE — 4A023N7 MEASUREMENT OF CARDIAC SAMPLING AND PRESSURE, LEFT HEART, PERCUTANEOUS APPROACH: ICD-10-PCS | Performed by: INTERNAL MEDICINE

## 2021-01-01 PROCEDURE — 71046 X-RAY EXAM CHEST 2 VIEWS: CPT

## 2021-01-01 PROCEDURE — 6370000000 HC RX 637 (ALT 250 FOR IP): Performed by: PHYSICIAN ASSISTANT

## 2021-01-01 PROCEDURE — 80061 LIPID PANEL: CPT

## 2021-01-01 PROCEDURE — 99152 MOD SED SAME PHYS/QHP 5/>YRS: CPT

## 2021-01-01 PROCEDURE — B2131ZZ FLUOROSCOPY OF MULTIPLE CORONARY ARTERY BYPASS GRAFTS USING LOW OSMOLAR CONTRAST: ICD-10-PCS | Performed by: INTERNAL MEDICINE

## 2021-01-01 PROCEDURE — 6360000002 HC RX W HCPCS

## 2021-01-01 PROCEDURE — 71045 X-RAY EXAM CHEST 1 VIEW: CPT

## 2021-01-01 PROCEDURE — C1894 INTRO/SHEATH, NON-LASER: HCPCS

## 2021-01-01 PROCEDURE — 2140000000 HC CCU INTERMEDIATE R&B

## 2021-01-01 PROCEDURE — 1123F ACP DISCUSS/DSCN MKR DOCD: CPT | Performed by: NURSE PRACTITIONER

## 2021-01-01 PROCEDURE — 70450 CT HEAD/BRAIN W/O DYE: CPT

## 2021-01-01 PROCEDURE — 92950 HEART/LUNG RESUSCITATION CPR: CPT

## 2021-01-01 PROCEDURE — 80076 HEPATIC FUNCTION PANEL: CPT

## 2021-01-01 PROCEDURE — 2720000010 HC SURG SUPPLY STERILE

## 2021-01-01 PROCEDURE — 96374 THER/PROPH/DIAG INJ IV PUSH: CPT

## 2021-01-01 PROCEDURE — 97162 PT EVAL MOD COMPLEX 30 MIN: CPT | Performed by: PHYSICAL THERAPIST

## 2021-01-01 PROCEDURE — 2580000003 HC RX 258: Performed by: PHYSICIAN ASSISTANT

## 2021-01-01 RX ORDER — WARFARIN SODIUM 2 MG/1
2 TABLET ORAL
Status: COMPLETED | OUTPATIENT
Start: 2021-01-01 | End: 2021-01-01

## 2021-01-01 RX ORDER — SODIUM CHLORIDE 0.9 % (FLUSH) 0.9 %
5-40 SYRINGE (ML) INJECTION EVERY 12 HOURS SCHEDULED
Status: DISCONTINUED | OUTPATIENT
Start: 2021-01-01 | End: 2021-01-01

## 2021-01-01 RX ORDER — INSULIN GLARGINE 100 [IU]/ML
15 INJECTION, SOLUTION SUBCUTANEOUS SEE ADMIN INSTRUCTIONS
Status: DISCONTINUED | OUTPATIENT
Start: 2021-01-01 | End: 2021-01-01 | Stop reason: DRUGHIGH

## 2021-01-01 RX ORDER — SEVELAMER CARBONATE 800 MG/1
800 TABLET, FILM COATED ORAL
Status: DISCONTINUED | OUTPATIENT
Start: 2021-01-01 | End: 2021-01-01 | Stop reason: HOSPADM

## 2021-01-01 RX ORDER — SODIUM CHLORIDE 0.9 % (FLUSH) 0.9 %
5-40 SYRINGE (ML) INJECTION PRN
Status: DISCONTINUED | OUTPATIENT
Start: 2021-01-01 | End: 2021-01-01

## 2021-01-01 RX ORDER — HEPARIN SODIUM 1000 [USP'U]/ML
4000 INJECTION, SOLUTION INTRAVENOUS; SUBCUTANEOUS ONCE
Status: DISCONTINUED | OUTPATIENT
Start: 2021-01-01 | End: 2021-01-01

## 2021-01-01 RX ORDER — HEPARIN SODIUM 1000 [USP'U]/ML
4000 INJECTION, SOLUTION INTRAVENOUS; SUBCUTANEOUS ONCE
Status: COMPLETED | OUTPATIENT
Start: 2021-01-01 | End: 2021-01-01

## 2021-01-01 RX ORDER — HEPARIN SODIUM 1000 [USP'U]/ML
2000 INJECTION, SOLUTION INTRAVENOUS; SUBCUTANEOUS PRN
Status: DISCONTINUED | OUTPATIENT
Start: 2021-01-01 | End: 2021-01-01

## 2021-01-01 RX ORDER — CINACALCET 30 MG/1
30 TABLET, FILM COATED ORAL EVERY OTHER DAY
Status: DISCONTINUED | OUTPATIENT
Start: 2021-01-01 | End: 2021-01-01 | Stop reason: HOSPADM

## 2021-01-01 RX ORDER — PANTOPRAZOLE SODIUM 40 MG/1
40 TABLET, DELAYED RELEASE ORAL DAILY
COMMUNITY

## 2021-01-01 RX ORDER — DEXTROSE MONOHYDRATE 50 MG/ML
100 INJECTION, SOLUTION INTRAVENOUS PRN
Status: DISCONTINUED | OUTPATIENT
Start: 2021-01-01 | End: 2021-01-01 | Stop reason: HOSPADM

## 2021-01-01 RX ORDER — ROSUVASTATIN CALCIUM 10 MG/1
10 TABLET, COATED ORAL NIGHTLY
Status: DISCONTINUED | OUTPATIENT
Start: 2021-01-01 | End: 2021-01-01 | Stop reason: HOSPADM

## 2021-01-01 RX ORDER — CHOLECALCIFEROL (VITAMIN D3) 10 MCG
1 TABLET ORAL DAILY
Status: DISCONTINUED | OUTPATIENT
Start: 2021-01-01 | End: 2021-01-01 | Stop reason: HOSPADM

## 2021-01-01 RX ORDER — RANOLAZINE 500 MG/1
500 TABLET, EXTENDED RELEASE ORAL 2 TIMES DAILY
Qty: 60 TABLET | Refills: 0 | Status: SHIPPED | OUTPATIENT
Start: 2021-01-01

## 2021-01-01 RX ORDER — DEXTROSE MONOHYDRATE 25 G/50ML
12.5 INJECTION, SOLUTION INTRAVENOUS PRN
Status: DISCONTINUED | OUTPATIENT
Start: 2021-01-01 | End: 2021-01-01 | Stop reason: HOSPADM

## 2021-01-01 RX ORDER — MIDODRINE HYDROCHLORIDE 5 MG/1
10 TABLET ORAL ONCE
Status: COMPLETED | OUTPATIENT
Start: 2021-01-01 | End: 2021-01-01

## 2021-01-01 RX ORDER — POLYETHYLENE GLYCOL 3350 17 G/17G
17 POWDER, FOR SOLUTION ORAL DAILY PRN
Status: DISCONTINUED | OUTPATIENT
Start: 2021-01-01 | End: 2021-01-01 | Stop reason: HOSPADM

## 2021-01-01 RX ORDER — SODIUM CHLORIDE 9 MG/ML
25 INJECTION, SOLUTION INTRAVENOUS PRN
Status: DISCONTINUED | OUTPATIENT
Start: 2021-01-01 | End: 2021-01-01 | Stop reason: HOSPADM

## 2021-01-01 RX ORDER — RANOLAZINE 500 MG/1
500 TABLET, EXTENDED RELEASE ORAL 2 TIMES DAILY
Status: DISCONTINUED | OUTPATIENT
Start: 2021-01-01 | End: 2021-01-01

## 2021-01-01 RX ORDER — SODIUM CHLORIDE 9 MG/ML
25 INJECTION, SOLUTION INTRAVENOUS PRN
Status: DISCONTINUED | OUTPATIENT
Start: 2021-01-01 | End: 2021-01-01

## 2021-01-01 RX ORDER — SODIUM CHLORIDE 0.9 % (FLUSH) 0.9 %
5-40 SYRINGE (ML) INJECTION PRN
Status: DISCONTINUED | OUTPATIENT
Start: 2021-01-01 | End: 2021-01-01 | Stop reason: HOSPADM

## 2021-01-01 RX ORDER — OMEGA-3/DHA/EPA/FISH OIL 300-1000MG
1 CAPSULE ORAL 3 TIMES DAILY
Status: DISCONTINUED | OUTPATIENT
Start: 2021-01-01 | End: 2021-01-01 | Stop reason: RX

## 2021-01-01 RX ORDER — SEVELAMER CARBONATE 800 MG/1
800 TABLET, FILM COATED ORAL 3 TIMES DAILY
Status: DISCONTINUED | OUTPATIENT
Start: 2021-01-01 | End: 2021-01-01 | Stop reason: HOSPADM

## 2021-01-01 RX ORDER — ASPIRIN 81 MG/1
81 TABLET, CHEWABLE ORAL DAILY
Status: DISCONTINUED | OUTPATIENT
Start: 2021-01-01 | End: 2021-01-01 | Stop reason: HOSPADM

## 2021-01-01 RX ORDER — WARFARIN SODIUM 2 MG/1
2 TABLET ORAL DAILY
Status: DISCONTINUED | OUTPATIENT
Start: 2021-01-01 | End: 2021-01-01 | Stop reason: HOSPADM

## 2021-01-01 RX ORDER — ROSUVASTATIN CALCIUM 40 MG/1
40 TABLET, COATED ORAL NIGHTLY
Status: DISCONTINUED | OUTPATIENT
Start: 2021-01-01 | End: 2021-01-01

## 2021-01-01 RX ORDER — ASPIRIN 81 MG/1
324 TABLET, CHEWABLE ORAL ONCE
Status: COMPLETED | OUTPATIENT
Start: 2021-01-01 | End: 2021-01-01

## 2021-01-01 RX ORDER — PANTOPRAZOLE SODIUM 40 MG/1
40 TABLET, DELAYED RELEASE ORAL DAILY
Status: DISCONTINUED | OUTPATIENT
Start: 2021-01-01 | End: 2021-01-01 | Stop reason: HOSPADM

## 2021-01-01 RX ORDER — WARFARIN SODIUM 2 MG/1
2 TABLET ORAL
Status: ACTIVE | OUTPATIENT
Start: 2021-01-01 | End: 2021-01-01

## 2021-01-01 RX ORDER — DEXTROSE MONOHYDRATE 25 G/50ML
25 INJECTION, SOLUTION INTRAVENOUS ONCE
Status: COMPLETED | OUTPATIENT
Start: 2021-01-01 | End: 2021-01-01

## 2021-01-01 RX ORDER — ROSUVASTATIN CALCIUM 40 MG/1
40 TABLET, COATED ORAL DAILY
Qty: 30 TABLET | Refills: 0 | Status: ON HOLD
Start: 2021-01-01 | End: 2021-01-01 | Stop reason: HOSPADM

## 2021-01-01 RX ORDER — MIDODRINE HYDROCHLORIDE 10 MG/1
10 TABLET ORAL 3 TIMES DAILY
Status: DISCONTINUED | OUTPATIENT
Start: 2021-01-01 | End: 2021-01-01 | Stop reason: HOSPADM

## 2021-01-01 RX ORDER — PROMETHAZINE HYDROCHLORIDE 25 MG/1
12.5 TABLET ORAL EVERY 6 HOURS PRN
Status: DISCONTINUED | OUTPATIENT
Start: 2021-01-01 | End: 2021-01-01 | Stop reason: HOSPADM

## 2021-01-01 RX ORDER — RANOLAZINE 500 MG/1
500 TABLET, EXTENDED RELEASE ORAL 2 TIMES DAILY
Status: DISCONTINUED | OUTPATIENT
Start: 2021-01-01 | End: 2021-01-01 | Stop reason: HOSPADM

## 2021-01-01 RX ORDER — ALBUMIN (HUMAN) 12.5 G/50ML
25 SOLUTION INTRAVENOUS PRN
Status: DISCONTINUED | OUTPATIENT
Start: 2021-01-01 | End: 2021-01-01 | Stop reason: HOSPADM

## 2021-01-01 RX ORDER — WARFARIN SODIUM 2 MG/1
TABLET ORAL
Qty: 60 TABLET | Refills: 0 | Status: SHIPPED | OUTPATIENT
Start: 2021-01-01

## 2021-01-01 RX ORDER — WARFARIN SODIUM 2 MG/1
TABLET ORAL
Qty: 60 TABLET | Refills: 0 | Status: SHIPPED
Start: 2021-01-01 | End: 2021-01-01 | Stop reason: DRUGHIGH

## 2021-01-01 RX ORDER — WARFARIN SODIUM 5 MG/1
5 TABLET ORAL DAILY
Qty: 60 TABLET | Refills: 3 | Status: ON HOLD
Start: 2021-01-01 | End: 2021-01-01 | Stop reason: HOSPADM

## 2021-01-01 RX ORDER — SODIUM CHLORIDE 0.9 % (FLUSH) 0.9 %
5-40 SYRINGE (ML) INJECTION EVERY 12 HOURS SCHEDULED
Status: DISCONTINUED | OUTPATIENT
Start: 2021-01-01 | End: 2021-01-01 | Stop reason: HOSPADM

## 2021-01-01 RX ORDER — ATROPINE SULFATE 0.1 MG/ML
INJECTION INTRAVENOUS
Status: DISPENSED
Start: 2021-01-01 | End: 2021-01-01

## 2021-01-01 RX ORDER — DOCUSATE SODIUM 100 MG/1
100 CAPSULE, LIQUID FILLED ORAL DAILY
Status: DISCONTINUED | OUTPATIENT
Start: 2021-01-01 | End: 2021-01-01 | Stop reason: HOSPADM

## 2021-01-01 RX ORDER — HEPARIN SODIUM 1000 [USP'U]/ML
4000 INJECTION, SOLUTION INTRAVENOUS; SUBCUTANEOUS PRN
Status: DISCONTINUED | OUTPATIENT
Start: 2021-01-01 | End: 2021-01-01

## 2021-01-01 RX ORDER — MIDODRINE HYDROCHLORIDE 5 MG/1
10 TABLET ORAL
Status: DISCONTINUED | OUTPATIENT
Start: 2021-01-01 | End: 2021-01-01 | Stop reason: HOSPADM

## 2021-01-01 RX ORDER — LEVOTHYROXINE SODIUM 0.03 MG/1
25 TABLET ORAL DAILY
Status: DISCONTINUED | OUTPATIENT
Start: 2021-01-01 | End: 2021-01-01 | Stop reason: HOSPADM

## 2021-01-01 RX ORDER — ONDANSETRON 2 MG/ML
4 INJECTION INTRAMUSCULAR; INTRAVENOUS EVERY 6 HOURS PRN
Status: DISCONTINUED | OUTPATIENT
Start: 2021-01-01 | End: 2021-01-01 | Stop reason: HOSPADM

## 2021-01-01 RX ORDER — MECLIZINE HCL 12.5 MG/1
12.5 TABLET ORAL 4 TIMES DAILY PRN
COMMUNITY

## 2021-01-01 RX ORDER — MIDODRINE HYDROCHLORIDE 10 MG/1
10-20 TABLET ORAL 3 TIMES DAILY
Status: CANCELLED | OUTPATIENT
Start: 2021-01-01

## 2021-01-01 RX ORDER — SODIUM CHLORIDE 9 MG/ML
INJECTION, SOLUTION INTRAVENOUS CONTINUOUS
Status: DISCONTINUED | OUTPATIENT
Start: 2021-01-01 | End: 2021-01-01

## 2021-01-01 RX ORDER — INSULIN GLARGINE 100 [IU]/ML
10 INJECTION, SOLUTION SUBCUTANEOUS NIGHTLY
Status: DISCONTINUED | OUTPATIENT
Start: 2021-01-01 | End: 2021-01-01 | Stop reason: HOSPADM

## 2021-01-01 RX ORDER — DOPAMINE HYDROCHLORIDE 160 MG/100ML
5 INJECTION, SOLUTION INTRAVENOUS CONTINUOUS
Status: DISCONTINUED | OUTPATIENT
Start: 2021-01-01 | End: 2021-01-01

## 2021-01-01 RX ORDER — ERGOCALCIFEROL 1.25 MG/1
50000 CAPSULE ORAL
Status: DISCONTINUED | OUTPATIENT
Start: 2021-01-01 | End: 2021-01-01 | Stop reason: HOSPADM

## 2021-01-01 RX ORDER — HEPARIN SODIUM 10000 [USP'U]/100ML
5-30 INJECTION, SOLUTION INTRAVENOUS CONTINUOUS
Status: DISCONTINUED | OUTPATIENT
Start: 2021-01-01 | End: 2021-01-01

## 2021-01-01 RX ORDER — NICOTINE POLACRILEX 4 MG
15 LOZENGE BUCCAL PRN
Status: DISCONTINUED | OUTPATIENT
Start: 2021-01-01 | End: 2021-01-01 | Stop reason: HOSPADM

## 2021-01-01 RX ORDER — MECLIZINE HCL 12.5 MG/1
12.5 TABLET ORAL EVERY 6 HOURS SCHEDULED
Status: DISCONTINUED | OUTPATIENT
Start: 2021-01-01 | End: 2021-01-01 | Stop reason: HOSPADM

## 2021-01-01 RX ORDER — ACETAMINOPHEN 325 MG/1
650 TABLET ORAL EVERY 4 HOURS PRN
Status: DISCONTINUED | OUTPATIENT
Start: 2021-01-01 | End: 2021-01-01 | Stop reason: HOSPADM

## 2021-01-01 RX ORDER — INSULIN LISPRO 100 [IU]/ML
0-3 INJECTION, SOLUTION INTRAVENOUS; SUBCUTANEOUS NIGHTLY
Status: DISCONTINUED | OUTPATIENT
Start: 2021-01-01 | End: 2021-01-01 | Stop reason: HOSPADM

## 2021-01-01 RX ORDER — MAGNESIUM SULFATE IN WATER 40 MG/ML
2000 INJECTION, SOLUTION INTRAVENOUS PRN
Status: DISCONTINUED | OUTPATIENT
Start: 2021-01-01 | End: 2021-01-01

## 2021-01-01 RX ORDER — ROSUVASTATIN CALCIUM 10 MG/1
40 TABLET, COATED ORAL DAILY
Status: DISCONTINUED | OUTPATIENT
Start: 2021-01-01 | End: 2021-01-01 | Stop reason: HOSPADM

## 2021-01-01 RX ORDER — ALBUMIN (HUMAN) 12.5 G/50ML
12.5 SOLUTION INTRAVENOUS PRN
Status: DISCONTINUED | OUTPATIENT
Start: 2021-01-01 | End: 2021-01-01 | Stop reason: HOSPADM

## 2021-01-01 RX ORDER — ACETAMINOPHEN 325 MG/1
650 TABLET ORAL EVERY 6 HOURS PRN
Status: DISCONTINUED | OUTPATIENT
Start: 2021-01-01 | End: 2021-01-01 | Stop reason: HOSPADM

## 2021-01-01 RX ORDER — 0.9 % SODIUM CHLORIDE 0.9 %
500 INTRAVENOUS SOLUTION INTRAVENOUS ONCE
Status: DISCONTINUED | OUTPATIENT
Start: 2021-01-01 | End: 2021-01-01 | Stop reason: HOSPADM

## 2021-01-01 RX ORDER — WARFARIN SODIUM 2.5 MG/1
2.5 TABLET ORAL DAILY
Status: DISCONTINUED | OUTPATIENT
Start: 2021-01-01 | End: 2021-01-01

## 2021-01-01 RX ORDER — ASPIRIN 81 MG/1
81 TABLET, CHEWABLE ORAL DAILY
Qty: 30 TABLET | Refills: 0 | Status: SHIPPED | OUTPATIENT
Start: 2021-01-01

## 2021-01-01 RX ORDER — M-VIT,TX,IRON,MINS/CALC/FOLIC 27MG-0.4MG
1 TABLET ORAL DAILY
Status: ON HOLD | COMMUNITY
End: 2021-01-01 | Stop reason: HOSPADM

## 2021-01-01 RX ORDER — ACETAMINOPHEN 650 MG/1
650 SUPPOSITORY RECTAL EVERY 6 HOURS PRN
Status: DISCONTINUED | OUTPATIENT
Start: 2021-01-01 | End: 2021-01-01 | Stop reason: HOSPADM

## 2021-01-01 RX ORDER — INSULIN LISPRO 100 [IU]/ML
0-6 INJECTION, SOLUTION INTRAVENOUS; SUBCUTANEOUS
Status: DISCONTINUED | OUTPATIENT
Start: 2021-01-01 | End: 2021-01-01 | Stop reason: HOSPADM

## 2021-01-01 RX ORDER — POTASSIUM CHLORIDE 7.45 MG/ML
10 INJECTION INTRAVENOUS PRN
Status: DISCONTINUED | OUTPATIENT
Start: 2021-01-01 | End: 2021-01-01

## 2021-01-01 RX ORDER — MIDODRINE HYDROCHLORIDE 10 MG/1
10 TABLET ORAL PRN
Status: DISCONTINUED | OUTPATIENT
Start: 2021-01-01 | End: 2021-01-01 | Stop reason: HOSPADM

## 2021-01-01 RX ADMIN — INSULIN LISPRO 2 UNITS: 100 INJECTION, SOLUTION INTRAVENOUS; SUBCUTANEOUS at 22:00

## 2021-01-01 RX ADMIN — INSULIN GLARGINE 10 UNITS: 100 INJECTION, SOLUTION SUBCUTANEOUS at 22:09

## 2021-01-01 RX ADMIN — ASPIRIN 81 MG: 81 TABLET, CHEWABLE ORAL at 09:28

## 2021-01-01 RX ADMIN — INSULIN LISPRO 1 UNITS: 100 INJECTION, SOLUTION INTRAVENOUS; SUBCUTANEOUS at 17:22

## 2021-01-01 RX ADMIN — NEPHROCAP 1 MG: 1 CAP ORAL at 14:27

## 2021-01-01 RX ADMIN — SODIUM CHLORIDE, PRESERVATIVE FREE 10 ML: 5 INJECTION INTRAVENOUS at 20:49

## 2021-01-01 RX ADMIN — MECLIZINE 12.5 MG: 12.5 TABLET ORAL at 17:47

## 2021-01-01 RX ADMIN — Medication 10 ML: at 10:32

## 2021-01-01 RX ADMIN — PANTOPRAZOLE SODIUM 40 MG: 40 TABLET, DELAYED RELEASE ORAL at 12:06

## 2021-01-01 RX ADMIN — MECLIZINE 12.5 MG: 12.5 TABLET ORAL at 00:02

## 2021-01-01 RX ADMIN — PANTOPRAZOLE SODIUM 40 MG: 40 TABLET, DELAYED RELEASE ORAL at 09:00

## 2021-01-01 RX ADMIN — Medication 10 ML: at 22:24

## 2021-01-01 RX ADMIN — INSULIN GLARGINE 10 UNITS: 100 INJECTION, SOLUTION SUBCUTANEOUS at 20:49

## 2021-01-01 RX ADMIN — DOCUSATE SODIUM 100 MG: 100 CAPSULE, LIQUID FILLED ORAL at 09:00

## 2021-01-01 RX ADMIN — INSULIN GLARGINE 10 UNITS: 100 INJECTION, SOLUTION SUBCUTANEOUS at 22:28

## 2021-01-01 RX ADMIN — INSULIN HUMAN 10 UNITS: 100 INJECTION, SOLUTION PARENTERAL at 14:58

## 2021-01-01 RX ADMIN — SEVELAMER CARBONATE 800 MG: 800 TABLET, FILM COATED ORAL at 10:32

## 2021-01-01 RX ADMIN — MIDODRINE HYDROCHLORIDE 10 MG: 5 TABLET ORAL at 12:00

## 2021-01-01 RX ADMIN — DOCUSATE SODIUM 100 MG: 100 CAPSULE, LIQUID FILLED ORAL at 12:41

## 2021-01-01 RX ADMIN — ACETAMINOPHEN 650 MG: 325 TABLET ORAL at 15:31

## 2021-01-01 RX ADMIN — ROSUVASTATIN CALCIUM 10 MG: 10 TABLET, FILM COATED ORAL at 20:49

## 2021-01-01 RX ADMIN — MIDODRINE HYDROCHLORIDE 10 MG: 10 TABLET ORAL at 17:45

## 2021-01-01 RX ADMIN — SEVELAMER CARBONATE 800 MG: 800 TABLET, FILM COATED ORAL at 12:07

## 2021-01-01 RX ADMIN — MECLIZINE 12.5 MG: 12.5 TABLET ORAL at 23:47

## 2021-01-01 RX ADMIN — MIDODRINE HYDROCHLORIDE 10 MG: 10 TABLET ORAL at 09:00

## 2021-01-01 RX ADMIN — NEPHROCAP 1 MG: 1 CAP ORAL at 08:43

## 2021-01-01 RX ADMIN — ASPIRIN 81 MG: 81 TABLET, CHEWABLE ORAL at 07:39

## 2021-01-01 RX ADMIN — MIDODRINE HYDROCHLORIDE 10 MG: 10 TABLET ORAL at 17:13

## 2021-01-01 RX ADMIN — ROSUVASTATIN 40 MG: 10 TABLET, FILM COATED ORAL at 10:32

## 2021-01-01 RX ADMIN — INSULIN LISPRO 2 UNITS: 100 INJECTION, SOLUTION INTRAVENOUS; SUBCUTANEOUS at 17:47

## 2021-01-01 RX ADMIN — INSULIN LISPRO 1 UNITS: 100 INJECTION, SOLUTION INTRAVENOUS; SUBCUTANEOUS at 11:49

## 2021-01-01 RX ADMIN — SEVELAMER CARBONATE 800 MG: 800 TABLET, FILM COATED ORAL at 00:50

## 2021-01-01 RX ADMIN — MIDODRINE HYDROCHLORIDE 10 MG: 10 TABLET ORAL at 12:41

## 2021-01-01 RX ADMIN — SEVELAMER CARBONATE 800 MG: 800 TABLET, FILM COATED ORAL at 17:42

## 2021-01-01 RX ADMIN — Medication 10 ML: at 09:13

## 2021-01-01 RX ADMIN — LEVOTHYROXINE SODIUM 25 MCG: 0.03 TABLET ORAL at 06:58

## 2021-01-01 RX ADMIN — INSULIN LISPRO 2 UNITS: 100 INJECTION, SOLUTION INTRAVENOUS; SUBCUTANEOUS at 12:28

## 2021-01-01 RX ADMIN — IOPAMIDOL 90 ML: 755 INJECTION, SOLUTION INTRAVENOUS at 12:03

## 2021-01-01 RX ADMIN — PANTOPRAZOLE SODIUM 40 MG: 40 TABLET, DELAYED RELEASE ORAL at 12:46

## 2021-01-01 RX ADMIN — ERGOCALCIFEROL 50000 UNITS: 1.25 CAPSULE ORAL at 08:50

## 2021-01-01 RX ADMIN — PHYTONADIONE 5 MG: 10 INJECTION, EMULSION INTRAMUSCULAR; INTRAVENOUS; SUBCUTANEOUS at 20:15

## 2021-01-01 RX ADMIN — SEVELAMER CARBONATE 800 MG: 800 TABLET, FILM COATED ORAL at 14:22

## 2021-01-01 RX ADMIN — DOPAMINE HYDROCHLORIDE 2.5 MCG/KG/MIN: 160 INJECTION, SOLUTION INTRAVENOUS at 08:56

## 2021-01-01 RX ADMIN — LEVOTHYROXINE SODIUM 25 MCG: 0.03 TABLET ORAL at 07:39

## 2021-01-01 RX ADMIN — WARFARIN SODIUM 2 MG: 2 TABLET ORAL at 22:09

## 2021-01-01 RX ADMIN — MIDODRINE HYDROCHLORIDE 10 MG: 10 TABLET ORAL at 07:39

## 2021-01-01 RX ADMIN — ROSUVASTATIN CALCIUM 10 MG: 10 TABLET, FILM COATED ORAL at 22:20

## 2021-01-01 RX ADMIN — SODIUM CHLORIDE, PRESERVATIVE FREE 10 ML: 5 INJECTION INTRAVENOUS at 22:08

## 2021-01-01 RX ADMIN — SODIUM CHLORIDE, PRESERVATIVE FREE 10 ML: 5 INJECTION INTRAVENOUS at 12:08

## 2021-01-01 RX ADMIN — INSULIN GLARGINE 10 UNITS: 100 INJECTION, SOLUTION SUBCUTANEOUS at 22:20

## 2021-01-01 RX ADMIN — Medication 10 ML: at 09:18

## 2021-01-01 RX ADMIN — RANOLAZINE 500 MG: 500 TABLET, FILM COATED, EXTENDED RELEASE ORAL at 12:06

## 2021-01-01 RX ADMIN — INSULIN LISPRO 2 UNITS: 100 INJECTION, SOLUTION INTRAVENOUS; SUBCUTANEOUS at 21:04

## 2021-01-01 RX ADMIN — MIDODRINE HYDROCHLORIDE 10 MG: 10 TABLET ORAL at 17:17

## 2021-01-01 RX ADMIN — RANOLAZINE 500 MG: 500 TABLET, FILM COATED, EXTENDED RELEASE ORAL at 20:49

## 2021-01-01 RX ADMIN — CINACALCET HYDROCHLORIDE 30 MG: 30 TABLET, FILM COATED ORAL at 09:00

## 2021-01-01 RX ADMIN — ALBUMIN (HUMAN) 12.5 G: 0.25 INJECTION, SOLUTION INTRAVENOUS at 12:25

## 2021-01-01 RX ADMIN — ASPIRIN 81 MG: 81 TABLET, CHEWABLE ORAL at 12:05

## 2021-01-01 RX ADMIN — CINACALCET HYDROCHLORIDE 30 MG: 30 TABLET, FILM COATED ORAL at 08:43

## 2021-01-01 RX ADMIN — DOCUSATE SODIUM 100 MG: 100 CAPSULE, LIQUID FILLED ORAL at 08:43

## 2021-01-01 RX ADMIN — MIDODRINE HYDROCHLORIDE 10 MG: 10 TABLET ORAL at 12:05

## 2021-01-01 RX ADMIN — SEVELAMER CARBONATE 800 MG: 800 TABLET, FILM COATED ORAL at 12:06

## 2021-01-01 RX ADMIN — RANOLAZINE 500 MG: 500 TABLET, FILM COATED, EXTENDED RELEASE ORAL at 08:44

## 2021-01-01 RX ADMIN — ASPIRIN 81 MG: 81 TABLET, CHEWABLE ORAL at 09:51

## 2021-01-01 RX ADMIN — MECLIZINE 12.5 MG: 12.5 TABLET ORAL at 05:13

## 2021-01-01 RX ADMIN — CINACALCET HYDROCHLORIDE 30 MG: 30 TABLET, FILM COATED ORAL at 12:41

## 2021-01-01 RX ADMIN — Medication 10 ML: at 20:29

## 2021-01-01 RX ADMIN — HEPARIN SODIUM 2000 UNITS: 1000 INJECTION INTRAVENOUS; SUBCUTANEOUS at 11:25

## 2021-01-01 RX ADMIN — SEVELAMER CARBONATE 800 MG: 800 TABLET, FILM COATED ORAL at 09:28

## 2021-01-01 RX ADMIN — ASPIRIN 81 MG: 81 TABLET, CHEWABLE ORAL at 08:43

## 2021-01-01 RX ADMIN — SEVELAMER CARBONATE 800 MG: 800 TABLET, FILM COATED ORAL at 08:44

## 2021-01-01 RX ADMIN — ASPIRIN 81 MG: 81 TABLET, CHEWABLE ORAL at 09:00

## 2021-01-01 RX ADMIN — SEVELAMER CARBONATE 800 MG: 800 TABLET, FILM COATED ORAL at 20:29

## 2021-01-01 RX ADMIN — ACETAMINOPHEN 650 MG: 325 TABLET ORAL at 00:56

## 2021-01-01 RX ADMIN — ROSUVASTATIN CALCIUM 40 MG: 40 TABLET, FILM COATED ORAL at 22:08

## 2021-01-01 RX ADMIN — HEPARIN SODIUM 4000 UNITS: 1000 INJECTION INTRAVENOUS; SUBCUTANEOUS at 01:02

## 2021-01-01 RX ADMIN — ROSUVASTATIN CALCIUM 10 MG: 10 TABLET, FILM COATED ORAL at 22:28

## 2021-01-01 RX ADMIN — HEPARIN SODIUM AND DEXTROSE 12 UNITS/KG/HR: 10000; 5 INJECTION INTRAVENOUS at 01:06

## 2021-01-01 RX ADMIN — Medication 10 ML: at 01:03

## 2021-01-01 RX ADMIN — PANTOPRAZOLE SODIUM 40 MG: 40 TABLET, DELAYED RELEASE ORAL at 10:32

## 2021-01-01 RX ADMIN — MIDODRINE HYDROCHLORIDE 10 MG: 10 TABLET ORAL at 12:06

## 2021-01-01 RX ADMIN — DOCUSATE SODIUM 100 MG: 100 CAPSULE, LIQUID FILLED ORAL at 12:06

## 2021-01-01 RX ADMIN — INSULIN LISPRO 1 UNITS: 100 INJECTION, SOLUTION INTRAVENOUS; SUBCUTANEOUS at 20:49

## 2021-01-01 RX ADMIN — HEPARIN SODIUM AND DEXTROSE 12 UNITS/KG/HR: 10000; 5 INJECTION INTRAVENOUS at 16:45

## 2021-01-01 RX ADMIN — MIDODRINE HYDROCHLORIDE 10 MG: 10 TABLET ORAL at 12:07

## 2021-01-01 RX ADMIN — LEVOTHYROXINE SODIUM 25 MCG: 0.03 TABLET ORAL at 05:58

## 2021-01-01 RX ADMIN — SODIUM CHLORIDE: 9 INJECTION, SOLUTION INTRAVENOUS at 06:18

## 2021-01-01 RX ADMIN — ASPIRIN 81 MG: 81 TABLET, CHEWABLE ORAL at 10:32

## 2021-01-01 RX ADMIN — MECLIZINE 12.5 MG: 12.5 TABLET ORAL at 18:07

## 2021-01-01 RX ADMIN — MIDODRINE HYDROCHLORIDE 10 MG: 5 TABLET ORAL at 07:00

## 2021-01-01 RX ADMIN — INSULIN LISPRO 1 UNITS: 100 INJECTION, SOLUTION INTRAVENOUS; SUBCUTANEOUS at 09:29

## 2021-01-01 RX ADMIN — INSULIN LISPRO 1 UNITS: 100 INJECTION, SOLUTION INTRAVENOUS; SUBCUTANEOUS at 22:25

## 2021-01-01 RX ADMIN — INSULIN GLARGINE 15 UNITS: 100 INJECTION, SOLUTION SUBCUTANEOUS at 21:59

## 2021-01-01 RX ADMIN — DEXTROSE MONOHYDRATE 25 G: 25 INJECTION, SOLUTION INTRAVENOUS at 14:58

## 2021-01-01 RX ADMIN — Medication 10 ML: at 21:04

## 2021-01-01 RX ADMIN — ASPIRIN 324 MG: 81 TABLET, CHEWABLE ORAL at 19:31

## 2021-01-01 RX ADMIN — MECLIZINE 12.5 MG: 12.5 TABLET ORAL at 14:22

## 2021-01-01 RX ADMIN — ROSUVASTATIN 40 MG: 10 TABLET, FILM COATED ORAL at 09:28

## 2021-01-01 RX ADMIN — ALBUMIN (HUMAN) 25 G: 0.25 INJECTION, SOLUTION INTRAVENOUS at 09:36

## 2021-01-01 RX ADMIN — PANTOPRAZOLE SODIUM 40 MG: 40 TABLET, DELAYED RELEASE ORAL at 09:50

## 2021-01-01 RX ADMIN — INSULIN GLARGINE 15 UNITS: 100 INJECTION, SOLUTION SUBCUTANEOUS at 21:03

## 2021-01-01 RX ADMIN — SEVELAMER CARBONATE 800 MG: 800 TABLET, FILM COATED ORAL at 21:03

## 2021-01-01 RX ADMIN — MIDODRINE HYDROCHLORIDE 10 MG: 5 TABLET ORAL at 15:05

## 2021-01-01 RX ADMIN — INSULIN GLARGINE 10 UNITS: 100 INJECTION, SOLUTION SUBCUTANEOUS at 22:25

## 2021-01-01 RX ADMIN — MIDODRINE HYDROCHLORIDE 10 MG: 10 TABLET ORAL at 07:41

## 2021-01-01 RX ADMIN — MIDODRINE HYDROCHLORIDE 10 MG: 10 TABLET ORAL at 17:42

## 2021-01-01 RX ADMIN — SEVELAMER CARBONATE 800 MG: 800 TABLET, FILM COATED ORAL at 22:08

## 2021-01-01 RX ADMIN — Medication 10 ML: at 09:00

## 2021-01-01 RX ADMIN — NEPHROCAP 1 MG: 1 CAP ORAL at 10:00

## 2021-01-01 RX ADMIN — PANTOPRAZOLE SODIUM 40 MG: 40 TABLET, DELAYED RELEASE ORAL at 08:44

## 2021-01-01 RX ADMIN — ALBUMIN (HUMAN) 12.5 G: 0.25 INJECTION, SOLUTION INTRAVENOUS at 09:32

## 2021-01-01 RX ADMIN — INSULIN LISPRO 1 UNITS: 100 INJECTION, SOLUTION INTRAVENOUS; SUBCUTANEOUS at 17:42

## 2021-01-01 RX ADMIN — MECLIZINE 12.5 MG: 12.5 TABLET ORAL at 05:16

## 2021-01-01 RX ADMIN — DOCUSATE SODIUM 100 MG: 100 CAPSULE, LIQUID FILLED ORAL at 09:51

## 2021-01-01 RX ADMIN — LEVOTHYROXINE SODIUM 25 MCG: 0.03 TABLET ORAL at 09:00

## 2021-01-01 RX ADMIN — WARFARIN SODIUM 2 MG: 2 TABLET ORAL at 17:48

## 2021-01-01 RX ADMIN — PANTOPRAZOLE SODIUM 40 MG: 40 TABLET, DELAYED RELEASE ORAL at 09:28

## 2021-01-01 RX ADMIN — MIDODRINE HYDROCHLORIDE 10 MG: 5 TABLET ORAL at 08:51

## 2021-01-01 RX ADMIN — CINACALCET HYDROCHLORIDE 30 MG: 30 TABLET, FILM COATED ORAL at 12:06

## 2021-01-01 RX ADMIN — RANOLAZINE 500 MG: 500 TABLET, FILM COATED, EXTENDED RELEASE ORAL at 22:08

## 2021-01-01 RX ADMIN — ACETAMINOPHEN 650 MG: 325 TABLET ORAL at 17:13

## 2021-01-01 RX ADMIN — ALBUMIN (HUMAN) 12.5 G: 0.25 INJECTION, SOLUTION INTRAVENOUS at 10:46

## 2021-01-01 ASSESSMENT — PAIN SCALES - GENERAL
PAINLEVEL_OUTOF10: 4
PAINLEVEL_OUTOF10: 0
PAINLEVEL_OUTOF10: 10
PAINLEVEL_OUTOF10: 0
PAINLEVEL_OUTOF10: 10
PAINLEVEL_OUTOF10: 3
PAINLEVEL_OUTOF10: 0
PAINLEVEL_OUTOF10: 8
PAINLEVEL_OUTOF10: 0
PAINLEVEL_OUTOF10: 1
PAINLEVEL_OUTOF10: 0
PAINLEVEL_OUTOF10: 1
PAINLEVEL_OUTOF10: 0
PAINLEVEL_OUTOF10: 4

## 2021-01-01 ASSESSMENT — ENCOUNTER SYMPTOMS
SHORTNESS OF BREATH: 1
PHOTOPHOBIA: 0
SORE THROAT: 0
COLOR CHANGE: 0
EYE PAIN: 0
CHEST TIGHTNESS: 0
VOMITING: 0
SHORTNESS OF BREATH: 1
BACK PAIN: 0
BLOOD IN STOOL: 0
SINUS PAIN: 0
WHEEZING: 0
COUGH: 0
DIARRHEA: 0
ABDOMINAL PAIN: 0
NAUSEA: 0
NAUSEA: 1
COUGH: 0
VOMITING: 0
EYE REDNESS: 0
CHEST TIGHTNESS: 0
DIARRHEA: 0
ABDOMINAL PAIN: 0
CONSTIPATION: 0
BACK PAIN: 0
PHOTOPHOBIA: 0

## 2021-01-01 ASSESSMENT — PAIN DESCRIPTION - DESCRIPTORS
DESCRIPTORS: ACHING;CONSTANT;PRESSURE
DESCRIPTORS: ACHING;SHARP
DESCRIPTORS: ACHING;CONSTANT
DESCRIPTORS: ACHING;CONSTANT

## 2021-01-01 ASSESSMENT — PAIN DESCRIPTION - LOCATION
LOCATION: BACK;NECK
LOCATION: GENERALIZED
LOCATION: BACK

## 2021-01-01 ASSESSMENT — PAIN DESCRIPTION - PAIN TYPE
TYPE: CHRONIC PAIN
TYPE: ACUTE PAIN
TYPE: CHRONIC PAIN
TYPE: CHRONIC PAIN

## 2021-01-07 NOTE — PROGRESS NOTES
ANTICOAGULATION MONITORING    Marlyn Cottrell, 1940    Anticoagulation Indication(s):  Afib    Referring Physician:   Dr. Keesha Dhaliwal  Goal INR Range:  2-3    Duration of Anticoagulation Therapy: indefinite  Home or Lab Draw: Yue Isabel  Product patient has at home: warfarin 2 mg    Lab Results   Component Value Date    INR 3.30 01/06/2021    INR 3.10 12/28/2020    INR 1.80 12/21/2020    PROTIME 12.3 12/10/2018    PROTIME 11.5 11/30/2016    PROTIME 10.1 12/18/2014        Component Value Date    INR 2.10 07/27/2020    INR 2.20 07/20/2020    INR 1.40 07/13/2020       INR Summary                          Warfarin regimen (mg)  Date INR A/P Sun Mon Tue Wed Thu Fri Sat Mg/wk                                                                                   1/6/21 3.3 Above goal 5 7.5 5 7.5  5 7.5 5 42.5   12/28/20 3.1 Just above goal 5 7.5 7.5 7.5 5 7.5 5 45   12/21/20 1.8 Below goal 5 7.5 7.5 7.5 5 7.5 5 45   12/14/20 2.9 At goal 5 7.5 5 7.5 5 7.5 5 42.5   12/7/20 2.8 At goal 5 7.5 5 7.5 5 7.5 5 42.5   11/30/20 2.7 At goal 5 7.5 5 7.5 5 7.5 5 42.5   11/23/20 2.3 At goal 5 7.5 5 7.5 5 7.5 5 42.5   11/16/20 3.2 Slightly over goal  5 7.5 5 7.5 5 7.5 5 42.5   11/9/20 2.9 At goal  5 7.5 5 7.5 5 7.5 5 42.5   11/6/20 2.9 At goal 5 7.5 5 7.5 5 7.5 5 42.5   10/26/20 2.3 At goal 5 7.5 5 7.5 5 7.5 5 42.5   10/20/20 3.1 At goal 5 7.5 5 7.5 5 7.5 5 42.5   10/12/20 3.3 Slightly above goal 5 7.5 5 7.5 5 7.5 5 42.5   10/5/20 2.9 At goal 5 7.5 5 7.5 5 7.5 5 42.5   9/28/20 3.2 Slightly above Goal 5 7.5 5 7.5 5 7.5 5 42.5   9/21/20 3.1 At goal 5 7.5 5 7.5 5 7.5 5 32.5   9/9/20 1.8 Below goal 5 7.5 5 7.5 5 7.5 5 32.5   9/7/20 4.0 Over goal 7.5 hold hold 7.5 5 7.5 5 32.5   8/31/20 1.90 Near goal 7.5 7.5 7.5 7.5 7.5 7.5 7.5 52.5   8/24/20 1.30 Below goal 7.5 7.5 7.5 7.5 7.5 7.5 7.5 49   8/17/20 1.5 Below goal 5 10 7.5 5 5 5 5 42.5   8/10/20 1.9 At goal 5 7.5 7.5 5 5 5 5 40   7/27/20 2.1 At goal 5 7.5 7.5 5 5 5 5 40        Assessment/Plan:

## 2021-01-11 NOTE — PROGRESS NOTES
ANTICOAGULATION MONITORING    Claudette Prader, 1940    Anticoagulation Indication(s):  Afib    Referring Physician:   Dr. Regina Gunter  Goal INR Range:  2-3    Duration of Anticoagulation Therapy: indefinite  Home or Lab Draw: Viji Butter  Product patient has at home: warfarin 2 mg    Lab Results   Component Value Date    INR 3.00 01/11/2021    INR 3.30 01/06/2021    INR 3.10 12/28/2020    PROTIME 12.3 12/10/2018    PROTIME 11.5 11/30/2016    PROTIME 10.1 12/18/2014        Component Value Date    INR 2.10 07/27/2020    INR 2.20 07/20/2020    INR 1.40 07/13/2020       INR Summary                          Warfarin regimen (mg)  Date INR A/P Sun Mon Tue Wed Thu Fri Sat Mg/wk                                                                      1/11/21 3.0 At goal 5 7.5 5 7.5 5 7.5 5 42.5   1/6/21 3.3 Above goal 5 7.5 5 7.5  5 7.5 5 42.5   12/28/20 3.1 Just above goal 5 7.5 7.5 7.5 5 7.5 5 45   12/21/20 1.8 Below goal 5 7.5 7.5 7.5 5 7.5 5 45   12/14/20 2.9 At goal 5 7.5 5 7.5 5 7.5 5 42.5   12/7/20 2.8 At goal 5 7.5 5 7.5 5 7.5 5 42.5   11/30/20 2.7 At goal 5 7.5 5 7.5 5 7.5 5 42.5   11/23/20 2.3 At goal 5 7.5 5 7.5 5 7.5 5 42.5   11/16/20 3.2 Slightly over goal  5 7.5 5 7.5 5 7.5 5 42.5   11/9/20 2.9 At goal  5 7.5 5 7.5 5 7.5 5 42.5   11/6/20 2.9 At goal 5 7.5 5 7.5 5 7.5 5 42.5   10/26/20 2.3 At goal 5 7.5 5 7.5 5 7.5 5 42.5   10/20/20 3.1 At goal 5 7.5 5 7.5 5 7.5 5 42.5   10/12/20 3.3 Slightly above goal 5 7.5 5 7.5 5 7.5 5 42.5   10/5/20 2.9 At goal 5 7.5 5 7.5 5 7.5 5 42.5   9/28/20 3.2 Slightly above Goal 5 7.5 5 7.5 5 7.5 5 42.5   9/21/20 3.1 At goal 5 7.5 5 7.5 5 7.5 5 32.5   9/9/20 1.8 Below goal 5 7.5 5 7.5 5 7.5 5 32.5   9/7/20 4.0 Over goal 7.5 hold hold 7.5 5 7.5 5 32.5   8/31/20 1.90 Near goal 7.5 7.5 7.5 7.5 7.5 7.5 7.5 52.5   8/24/20 1.30 Below goal 7.5 7.5 7.5 7.5 7.5 7.5 7.5 49   8/17/20 1.5 Below goal 5 10 7.5 5 5 5 5 42.5   8/10/20 1.9 At goal 5 7.5 7.5 5 5 5 5 40 7/27/20 2.1 At goal 5 7.5 7.5 5 5 5 5 40        Assessment/Plan:    Recent hospitalizations/HC visits None reported    Recent medication changes No   Medications taken regularly that may interact with warfarin or alter INR No significant drug interactions identified   Warfarin dose taken as prescribed Yes   Signs/symptoms of bleeding History of bleeding? No   Vitamin K intake Consistency of servings of green, leafy vegetables per week ? Yes   Recent vomiting/diarrhea/fever None reported   Changes in weight, activity, stress None reported   alcohol use Patient reports having 0 drinks per day   Upcoming surgeries or procedures None reported       Patient's INR is above goal-gave decreased dosing schedule  Patient was also reminded to maintain consistent vitamin K intake and call with any bleeding, medication changes, or fever/vomiting/diarrhea. Next INR check: 1/18/21    Addendum:  1/11/21  Reviewed assessment and plan. INR is below goal-same dosage instructions  Repeat INR in one week   Patient/family instructed.      Chio Arellano, CNP

## 2021-01-18 NOTE — PROGRESS NOTES
ANTICOAGULATION MONITORING    [facility], 1940    Anticoagulation Indication(s):  Afib    Referring Physician:   Dr. Sydney Campoverde  Goal INR Range:  2-3    Duration of Anticoagulation Therapy: indefinite  Home or Lab Draw: Jair Canas  Product patient has at home: warfarin 2 mg    Lab Results   Component Value Date    INR 3.50 01/18/2021    INR 3.00 01/11/2021    INR 3.30 01/06/2021    PROTIME 12.3 12/10/2018    PROTIME 11.5 11/30/2016    PROTIME 10.1 12/18/2014        Component Value Date    INR 2.10 07/27/2020    INR 2.20 07/20/2020    INR 1.40 07/13/2020       INR Summary                          Warfarin regimen (mg)  Date INR A/P Sun Mon Tue Wed Thu Fri Sat Mg/wk                                                         1/18/21  Above goal  5 5 5 7.5 5 7.5 5 40   1/11/21 3.0 At goal 5 7.5 5 7.5 5 7.5 5 42.5   1/6/21 3.3 Above goal 5 7.5 5 7.5  5 7.5 5 42.5   12/28/20 3.1 Just above goal 5 7.5 7.5 7.5 5 7.5 5 45   12/21/20 1.8 Below goal 5 7.5 7.5 7.5 5 7.5 5 45   12/14/20 2.9 At goal 5 7.5 5 7.5 5 7.5 5 42.5   12/7/20 2.8 At goal 5 7.5 5 7.5 5 7.5 5 42.5   11/30/20 2.7 At goal 5 7.5 5 7.5 5 7.5 5 42.5   11/23/20 2.3 At goal 5 7.5 5 7.5 5 7.5 5 42.5   11/16/20 3.2 Slightly over goal  5 7.5 5 7.5 5 7.5 5 42.5   11/9/20 2.9 At goal  5 7.5 5 7.5 5 7.5 5 42.5   11/6/20 2.9 At goal 5 7.5 5 7.5 5 7.5 5 42.5   10/26/20 2.3 At goal 5 7.5 5 7.5 5 7.5 5 42.5   10/20/20 3.1 At goal 5 7.5 5 7.5 5 7.5 5 42.5   10/12/20 3.3 Slightly above goal 5 7.5 5 7.5 5 7.5 5 42.5   10/5/20 2.9 At goal 5 7.5 5 7.5 5 7.5 5 42.5   9/28/20 3.2 Slightly above Goal 5 7.5 5 7.5 5 7.5 5 42.5   9/21/20 3.1 At goal 5 7.5 5 7.5 5 7.5 5 32.5   9/9/20 1.8 Below goal 5 7.5 5 7.5 5 7.5 5 32.5   9/7/20 4.0 Over goal 7.5 hold hold 7.5 5 7.5 5 32.5   8/31/20 1.90 Near goal 7.5 7.5 7.5 7.5 7.5 7.5 7.5 52.5   8/24/20 1.30 Below goal 7.5 7.5 7.5 7.5 7.5 7.5 7.5 49   8/17/20 1.5 Below goal 5 10 7.5 5 5 5 5 42.5   8/10/20 1.9 At goal 5 7.5 7.5 5 5 5 5 40 7/27/20 2.1 At goal 5 7.5 7.5 5 5 5 5 40        Assessment/Plan:    Recent hospitalizations/HC visits None reported    Recent medication changes No   Medications taken regularly that may interact with warfarin or alter INR No significant drug interactions identified   Warfarin dose taken as prescribed Yes   Signs/symptoms of bleeding History of bleeding? No   Vitamin K intake Consistency of servings of green, leafy vegetables per week ? Yes   Recent vomiting/diarrhea/fever None reported   Changes in weight, activity, stress None reported   alcohol use Patient reports having 0 drinks per day   Upcoming surgeries or procedures None reported       Patient's INR is above goal-gave decreased dosing schedule  Patient was also reminded to maintain consistent vitamin K intake and call with any bleeding, medication changes, or fever/vomiting/diarrhea. Next INR check: 1/25/21    Per NPTS. Pt will decrease dose today to 5 mg. 7.5 mg Wed & Fri 5 mg all other days. Recheck in a week. I relayed instructions to Alli at [place].  She said she will call pt

## 2021-01-26 NOTE — PROGRESS NOTES
ANTICOAGULATION MONITORING    Zenaida Subramanian, 1940    Anticoagulation Indication(s):  Afib    Referring Physician:   Dr. Laura Tierney  Goal INR Range:  2-3    Duration of Anticoagulation Therapy: indefinite  Home or Lab Draw: Philly Dennison patient has at home: warfarin 2 mg    Lab Results   Component Value Date    INR 4.00 01/26/2021    INR 3.50 01/18/2021    INR 3.00 01/11/2021    PROTIME 12.3 12/10/2018    PROTIME 11.5 11/30/2016    PROTIME 10.1 12/18/2014        Component Value Date    INR 2.10 07/27/2020    INR 2.20 07/20/2020    INR 1.40 07/13/2020       INR Summary                          Warfarin regimen (mg)  Date INR A/P Sun Mon Tue Wed Thu Fri Sat Mg/wk                                                                                                                                                                     rechk 2/2/21 1/26/21 4.0 Above goal 5  0 5 5 7.5 5    1/18/21 3.5 Above goal  5 5 5 7.5 5 7.5 5 40   1/11/21 3.0 At goal 5 7.5 5 7.5 5 7.5 5 42.5   1/6/21 3.3 Above goal 5 7.5 5 7.5  5 7.5 5 42.5   12/28/20 3.1 Just above goal 5 7.5 7.5 7.5 5 7.5 5 45   12/21/20 1.8 Below goal 5 7.5 7.5 7.5 5 7.5 5 45   12/14/20 2.9 At goal 5 7.5 5 7.5 5 7.5 5 42.5   12/7/20 2.8 At goal 5 7.5 5 7.5 5 7.5 5 42.5   11/30/20 2.7 At goal 5 7.5 5 7.5 5 7.5 5 42.5   11/23/20 2.3 At goal 5 7.5 5 7.5 5 7.5 5 42.5   11/16/20 3.2 Slightly over goal  5 7.5 5 7.5 5 7.5 5 42.5   11/9/20 2.9 At goal  5 7.5 5 7.5 5 7.5 5 42.5   11/6/20 2.9 At goal 5 7.5 5 7.5 5 7.5 5 42.5   10/26/20 2.3 At goal 5 7.5 5 7.5 5 7.5 5 42.5   10/20/20 3.1 At goal 5 7.5 5 7.5 5 7.5 5 42.5     Assessment/Plan:    Recent hospitalizations/HC visits None reported    Recent medication changes No   Medications taken regularly that may interact with warfarin or alter INR No significant drug interactions identified   Warfarin dose taken as prescribed Yes   Signs/symptoms of bleeding History of bleeding?  No Vitamin K intake Consistency of servings of green, leafy vegetables per week ? Yes   Recent vomiting/diarrhea/fever None reported   Changes in weight, activity, stress None reported   alcohol use Patient reports having 0 drinks per day   Upcoming surgeries or procedures None reported       Patient's INR is above goal-gave decreased dosing schedule  Patient was also reminded to maintain consistent vitamin K intake and call with any bleeding, medication changes, or fever/vomiting/diarrhea. Next INR check: 1/25/21    Per NPTS. Pt will decrease dose today to 5 mg. 7.5 mg Wed & Fri 5 mg all other days. Recheck in a week. I relayed instructions to Alli at [place].  She said she will call pt

## 2021-01-26 NOTE — PROGRESS NOTES
See anti-coag encounter.     ANTICOAGULATION MONITORING    Theodore Sullivan, 1940    Anticoagulation Indication(s):  Afib    Referring Physician:   Dr. Ivana Evans  Goal INR Range:  2-3    Duration of Anticoagulation Therapy: indefinite  Home or Lab Draw: Sumeet Mendez  Product patient has at home: warfarin 2 mg    Lab Results   Component Value Date    INR 4.00 01/26/2021    INR 3.50 01/18/2021    INR 3.00 01/11/2021    PROTIME 12.3 12/10/2018    PROTIME 11.5 11/30/2016    PROTIME 10.1 12/18/2014        Component Value Date    INR 2.10 07/27/2020    INR 2.20 07/20/2020    INR 1.40 07/13/2020       INR Summary                          Warfarin regimen (mg)  Date INR A/P Sun Mon Tue Wed Thu Fri Sat Mg/wk                                            1/25/21 4.0 Above goal 5 hold hold 7.5 5 7.5 5 30   1/18/21  Above goal  5 5 5 7.5 5 7.5 5 40   1/11/21 3.0 At goal 5 7.5 5 7.5 5 7.5 5 42.5   1/6/21 3.3 Above goal 5 7.5 5 7.5  5 7.5 5 42.5   12/28/20 3.1 Just above goal 5 7.5 7.5 7.5 5 7.5 5 45   12/21/20 1.8 Below goal 5 7.5 7.5 7.5 5 7.5 5 45   12/14/20 2.9 At goal 5 7.5 5 7.5 5 7.5 5 42.5   12/7/20 2.8 At goal 5 7.5 5 7.5 5 7.5 5 42.5   11/30/20 2.7 At goal 5 7.5 5 7.5 5 7.5 5 42.5   11/23/20 2.3 At goal 5 7.5 5 7.5 5 7.5 5 42.5   11/16/20 3.2 Slightly over goal  5 7.5 5 7.5 5 7.5 5 42.5   11/9/20 2.9 At goal  5 7.5 5 7.5 5 7.5 5 42.5   11/6/20 2.9 At goal 5 7.5 5 7.5 5 7.5 5 42.5   10/26/20 2.3 At goal 5 7.5 5 7.5 5 7.5 5 42.5   10/20/20 3.1 At goal 5 7.5 5 7.5 5 7.5 5 42.5   10/12/20 3.3 Slightly above goal 5 7.5 5 7.5 5 7.5 5 42.5   10/5/20 2.9 At goal 5 7.5 5 7.5 5 7.5 5 42.5   9/28/20 3.2 Slightly above Goal 5 7.5 5 7.5 5 7.5 5 42.5   9/21/20 3.1 At goal 5 7.5 5 7.5 5 7.5 5 32.5   9/9/20 1.8 Below goal 5 7.5 5 7.5 5 7.5 5 32.5   9/7/20 4.0 Over goal 7.5 hold hold 7.5 5 7.5 5 32.5   8/31/20 1.90 Near goal 7.5 7.5 7.5 7.5 7.5 7.5 7.5 52.5   8/24/20 1.30 Below goal 7.5 7.5 7.5 7.5 7.5 7.5 7.5 49 8/17/20 1.5 Below goal 5 10 7.5 5 5 5 5 42.5   8/10/20 1.9 At goal 5 7.5 7.5 5 5 5 5 40   7/27/20 2.1 At goal 5 7.5 7.5 5 5 5 5 40        Assessment/Plan:    Recent hospitalizations/HC visits None reported    Recent medication changes No   Medications taken regularly that may interact with warfarin or alter INR No significant drug interactions identified   Warfarin dose taken as prescribed Yes   Signs/symptoms of bleeding History of bleeding? No   Vitamin K intake Consistency of servings of green, leafy vegetables per week ? Yes   Recent vomiting/diarrhea/fever None reported   Changes in weight, activity, stress None reported   alcohol use Patient reports having 0 drinks per day   Upcoming surgeries or procedures None reported       Patient's INR is above goal-gave decreased dosing schedule  Patient was also reminded to maintain consistent vitamin K intake and call with any bleeding, medication changes, or fever/vomiting/diarrhea. Next INR check: 2/2//21    Spoke to the pt-the INR was tested 1/25/21. She held the dose that day, and 1/26/21. Left a message for the pt-resume dosage schedule.

## 2021-02-01 NOTE — PROGRESS NOTES
See anti-coag encounter.     ANTICOAGULATION MONITORING    Zuleima Booker, 1940    Anticoagulation Indication(s):  Afib    Referring Physician:   Dr. Lyndon Shin  Goal INR Range:  2-3    Duration of Anticoagulation Therapy: indefinite  Home or Lab Draw: Bertha Castañeda  Product patient has at home: warfarin 2 mg    Lab Results   Component Value Date    INR 2.30 02/01/2021    INR 4.00 01/26/2021    INR 3.50 01/18/2021    PROTIME 12.3 12/10/2018    PROTIME 11.5 11/30/2016    PROTIME 10.1 12/18/2014        Component Value Date    INR 2.10 07/27/2020    INR 2.20 07/20/2020    INR 1.40 07/13/2020       INR Summary                          Warfarin regimen (mg)  Date INR A/P Sun Mon Tue Wed Thu Fri Sat Mg/wk                                                                                   2/1/21 2.3 Above goal 5 5 5 7.5 5 7.5 5 40   1/25/21 4.0 Above goal 5 hold hold 7.5 5 7.5 5 30   1/18/21  Above goal  5 5 5 7.5 5 7.5 5 40   1/11/21 3.0 At goal 5 7.5 5 7.5 5 7.5 5 42.5   1/6/21 3.3 Above goal 5 7.5 5 7.5  5 7.5 5 42.5   12/28/20 3.1 Just above goal 5 7.5 7.5 7.5 5 7.5 5 45   12/21/20 1.8 Below goal 5 7.5 7.5 7.5 5 7.5 5 45   12/14/20 2.9 At goal 5 7.5 5 7.5 5 7.5 5 42.5   12/7/20 2.8 At goal 5 7.5 5 7.5 5 7.5 5 42.5   11/30/20 2.7 At goal 5 7.5 5 7.5 5 7.5 5 42.5   11/23/20 2.3 At goal 5 7.5 5 7.5 5 7.5 5 42.5   11/16/20 3.2 Slightly over goal  5 7.5 5 7.5 5 7.5 5 42.5   11/9/20 2.9 At goal  5 7.5 5 7.5 5 7.5 5 42.5   11/6/20 2.9 At goal 5 7.5 5 7.5 5 7.5 5 42.5   10/26/20 2.3 At goal 5 7.5 5 7.5 5 7.5 5 42.5   10/20/20 3.1 At goal 5 7.5 5 7.5 5 7.5 5 42.5   10/12/20 3.3 Slightly above goal 5 7.5 5 7.5 5 7.5 5 42.5   10/5/20 2.9 At goal 5 7.5 5 7.5 5 7.5 5 42.5   9/28/20 3.2 Slightly above Goal 5 7.5 5 7.5 5 7.5 5 42.5   9/21/20 3.1 At goal 5 7.5 5 7.5 5 7.5 5 32.5   9/9/20 1.8 Below goal 5 7.5 5 7.5 5 7.5 5 32.5   9/7/20 4.0 Over goal 7.5 hold hold 7.5 5 7.5 5 32.5   8/31/20 1.90 Near goal 7.5 7.5 7.5 7.5 7.5 7.5 7.5 52.5 8/24/20 1.30 Below goal 7.5 7.5 7.5 7.5 7.5 7.5 7.5 49   8/17/20 1.5 Below goal 5 10 7.5 5 5 5 5 42.5   8/10/20 1.9 At goal 5 7.5 7.5 5 5 5 5 40   7/27/20 2.1 At goal 5 7.5 7.5 5 5 5 5 40        Assessment/Plan:    Recent hospitalizations/HC visits None reported    Recent medication changes No   Medications taken regularly that may interact with warfarin or alter INR No significant drug interactions identified   Warfarin dose taken as prescribed Yes   Signs/symptoms of bleeding History of bleeding? No   Vitamin K intake Consistency of servings of green, leafy vegetables per week ? Yes   Recent vomiting/diarrhea/fever None reported   Changes in weight, activity, stress None reported   alcohol use Patient reports having 0 drinks per day   Upcoming surgeries or procedures None reported       Patient's INR is at goal-gave dosing schedule  Patient was also reminded to maintain consistent vitamin K intake and call with any bleeding, medication changes, or fever/vomiting/diarrhea. Next INR check: 2/8/21    Addendum:  2/2/21  Reviewed assessment and plan. INR is within goal, continue current dose  Repeat INR in one week/s. Patient/family instructed.      Caroline Wilson CNP

## 2021-02-08 NOTE — TELEPHONE ENCOUNTER
Her warfarin was refilled on 2/5/21 but patient says the wrong strength was refilled . The rx was for 2mg but she takes 5mg warfarin and sometimes even 7mg . Was her dose changed ?

## 2021-02-12 NOTE — PROGRESS NOTES
See anti-coag encounter.     ANTICOAGULATION MONITORING    Handy Hanks, 1940    Anticoagulation Indication(s):  Afib    Referring Physician:   Dr. Kartik Mendez  Goal INR Range:  2-3    Duration of Anticoagulation Therapy: indefinite  Home or Lab Draw: Scottie Gross 1154  Product patient has at home: warfarin 2 mg    Lab Results   Component Value Date    INR 2.80 02/08/2021    INR 2.30 02/01/2021    INR 4.00 01/26/2021    PROTIME 12.3 12/10/2018    PROTIME 11.5 11/30/2016    PROTIME 10.1 12/18/2014        Component Value Date    INR 2.10 07/27/2020    INR 2.20 07/20/2020    INR 1.40 07/13/2020       INR Summary                          Warfarin regimen (mg)  Date INR A/P Sun Mon Tue Wed Thu Fri Sat Mg/wk                                                                      2/8/21 2.8 At goal 2 2 2 2 2 2 2 14   2/1/21 2.3 Above goal 5 5 5 7.5 5 7.5 5 40   1/25/21 4.0 Above goal 5 hold hold 7.5 5 7.5 5 30   1/18/21  Above goal  5 5 5 7.5 5 7.5 5 40   1/11/21 3.0 At goal 5 7.5 5 7.5 5 7.5 5 42.5   1/6/21 3.3 Above goal 5 7.5 5 7.5  5 7.5 5 42.5   12/28/20 3.1 Just above goal 5 7.5 7.5 7.5 5 7.5 5 45   12/21/20 1.8 Below goal 5 7.5 7.5 7.5 5 7.5 5 45   12/14/20 2.9 At goal 5 7.5 5 7.5 5 7.5 5 42.5   12/7/20 2.8 At goal 5 7.5 5 7.5 5 7.5 5 42.5   11/30/20 2.7 At goal 5 7.5 5 7.5 5 7.5 5 42.5   11/23/20 2.3 At goal 5 7.5 5 7.5 5 7.5 5 42.5   11/16/20 3.2 Slightly over goal  5 7.5 5 7.5 5 7.5 5 42.5   11/9/20 2.9 At goal  5 7.5 5 7.5 5 7.5 5 42.5   11/6/20 2.9 At goal 5 7.5 5 7.5 5 7.5 5 42.5   10/26/20 2.3 At goal 5 7.5 5 7.5 5 7.5 5 42.5   10/20/20 3.1 At goal 5 7.5 5 7.5 5 7.5 5 42.5   10/12/20 3.3 Slightly above goal 5 7.5 5 7.5 5 7.5 5 42.5   10/5/20 2.9 At goal 5 7.5 5 7.5 5 7.5 5 42.5   9/28/20 3.2 Slightly above Goal 5 7.5 5 7.5 5 7.5 5 42.5   9/21/20 3.1 At goal 5 7.5 5 7.5 5 7.5 5 32.5   9/9/20 1.8 Below goal 5 7.5 5 7.5 5 7.5 5 32.5   9/7/20 4.0 Over goal 7.5 hold hold 7.5 5 7.5 5 32.5 8/31/20 1.90 Near goal 7.5 7.5 7.5 7.5 7.5 7.5 7.5 52.5   8/24/20 1.30 Below goal 7.5 7.5 7.5 7.5 7.5 7.5 7.5 49   8/17/20 1.5 Below goal 5 10 7.5 5 5 5 5 42.5   8/10/20 1.9 At goal 5 7.5 7.5 5 5 5 5 40   7/27/20 2.1 At goal 5 7.5 7.5 5 5 5 5 40        Assessment/Plan:    Recent hospitalizations/HC visits None reported    Recent medication changes No   Medications taken regularly that may interact with warfarin or alter INR No significant drug interactions identified   Warfarin dose taken as prescribed Yes   Signs/symptoms of bleeding History of bleeding? No   Vitamin K intake Consistency of servings of green, leafy vegetables per week ? Yes   Recent vomiting/diarrhea/fever None reported   Changes in weight, activity, stress None reported   alcohol use Patient reports having 0 drinks per day   Upcoming surgeries or procedures None reported       Patient's INR is at goal-gave dosing schedule  Patient was also reminded to maintain consistent vitamin K intake and call with any bleeding, medication changes, or fever/vomiting/diarrhea. Next INR check: 2/17/21    Addendum:  2/12/21  Reviewed assessment and plan. The pt was reporting she was taking Coumadin 5 mg, but was in fact taking a 2 mg tab daily. INR is within goal, continue current dose  Repeat INR in one week/s. Patient/family instructed.      Yosef Sin, JOCELIN

## 2021-02-12 NOTE — TELEPHONE ENCOUNTER
Spoke to the pt-she has been taking 2 mg daily, but was telling us she had a 5 mg tab. Adjusted the anti-coag track accordingly.

## 2021-02-22 NOTE — PROGRESS NOTES
See anti-coag encounter.     ANTICOAGULATION MONITORING    Jose Alfredo Martini, 1940    Anticoagulation Indication(s):  Afib    Referring Physician:   Dr. Shae Neumann  Goal INR Range:  2-3    Duration of Anticoagulation Therapy: indefinite  Home or Lab Draw: Xin Davila  Product patient has at home: warfarin 2 mg    Lab Results   Component Value Date    INR 3.20 02/22/2021    INR 2.80 02/08/2021    INR 2.30 02/01/2021    PROTIME 12.3 12/10/2018    PROTIME 11.5 11/30/2016    PROTIME 10.1 12/18/2014        Component Value Date    INR 2.10 07/27/2020    INR 2.20 07/20/2020    INR 1.40 07/13/2020       INR Summary                          Warfarin regimen (mg)  Date INR A/P Sun Mon Tue Wed Thu Fri Sat Mg/wk                                                         2/22/21  3.2 Just above goal 2 2 2 2 2 2 2 14   2/8/21 2.8 At goal 2 2 2 2 2 2 2 14   2/1/21 2.3 At goal 5 5 5 7.5 5 7.5 5 40   1/25/21 4.0 Above goal 5 hold hold 7.5 5 7.5 5 30   1/18/21  Above goal  5 5 5 7.5 5 7.5 5 40   1/11/21 3.0 At goal 5 7.5 5 7.5 5 7.5 5 42.5   1/6/21 3.3 Above goal 5 7.5 5 7.5  5 7.5 5 42.5   12/28/20 3.1 Just above goal 5 7.5 7.5 7.5 5 7.5 5 45   12/21/20 1.8 Below goal 5 7.5 7.5 7.5 5 7.5 5 45   12/14/20 2.9 At goal 5 7.5 5 7.5 5 7.5 5 42.5   12/7/20 2.8 At goal 5 7.5 5 7.5 5 7.5 5 42.5   11/30/20 2.7 At goal 5 7.5 5 7.5 5 7.5 5 42.5   11/23/20 2.3 At goal 5 7.5 5 7.5 5 7.5 5 42.5   11/16/20 3.2 Slightly over goal  5 7.5 5 7.5 5 7.5 5 42.5   11/9/20 2.9 At goal  5 7.5 5 7.5 5 7.5 5 42.5   11/6/20 2.9 At goal 5 7.5 5 7.5 5 7.5 5 42.5   10/26/20 2.3 At goal 5 7.5 5 7.5 5 7.5 5 42.5   10/20/20 3.1 At goal 5 7.5 5 7.5 5 7.5 5 42.5   10/12/20 3.3 Slightly above goal 5 7.5 5 7.5 5 7.5 5 42.5   10/5/20 2.9 At goal 5 7.5 5 7.5 5 7.5 5 42.5   9/28/20 3.2 Slightly above Goal 5 7.5 5 7.5 5 7.5 5 42.5   9/21/20 3.1 At goal 5 7.5 5 7.5 5 7.5 5 32.5   9/9/20 1.8 Below goal 5 7.5 5 7.5 5 7.5 5 32.5   9/7/20 4.0 Over goal 7.5 hold hold 7.5 5 7.5 5 32.5   8/31/20

## 2021-03-01 NOTE — PROGRESS NOTES
See anti-coag encounter.     ANTICOAGULATION MONITORING    Nguyen Landaverde, 1940    Anticoagulation Indication(s):  Afib    Referring Physician:   Dr. Donnell Wagoner  Goal INR Range:  2-3    Duration of Anticoagulation Therapy: indefinite  Home or Lab Draw: Little Aas  Product patient has at home: warfarin 2 mg    Lab Results   Component Value Date    INR 1.90 03/01/2021    INR 3.20 02/22/2021    INR 2.80 02/08/2021    PROTIME 12.3 12/10/2018    PROTIME 11.5 11/30/2016    PROTIME 10.1 12/18/2014        Component Value Date    INR 2.10 07/27/2020    INR 2.20 07/20/2020    INR 1.40 07/13/2020       INR Summary                          Warfarin regimen (mg)  Date INR A/P Sun Mon Tue Wed Thu Fri Sat Mg/wk                                            3/1/21 1.9 Just below goal 2 3 2 2 2 2 2 15   2/22/21  3.2 Just above goal 2 2 2 2 2 2 2 14   2/8/21 2.8 At goal 2 2 2 2 2 2 2 14   2/1/21 2.3 At goal 5 5 5 7.5 5 7.5 5 40   1/25/21 4.0 Above goal 5 hold hold 7.5 5 7.5 5 30   1/18/21  Above goal  5 5 5 7.5 5 7.5 5 40   1/11/21 3.0 At goal 5 7.5 5 7.5 5 7.5 5 42.5   1/6/21 3.3 Above goal 5 7.5 5 7.5  5 7.5 5 42.5   12/28/20 3.1 Just above goal 5 7.5 7.5 7.5 5 7.5 5 45   12/21/20 1.8 Below goal 5 7.5 7.5 7.5 5 7.5 5 45   12/14/20 2.9 At goal 5 7.5 5 7.5 5 7.5 5 42.5   12/7/20 2.8 At goal 5 7.5 5 7.5 5 7.5 5 42.5   11/30/20 2.7 At goal 5 7.5 5 7.5 5 7.5 5 42.5   11/23/20 2.3 At goal 5 7.5 5 7.5 5 7.5 5 42.5   11/16/20 3.2 Slightly over goal  5 7.5 5 7.5 5 7.5 5 42.5   11/9/20 2.9 At goal  5 7.5 5 7.5 5 7.5 5 42.5   11/6/20 2.9 At goal 5 7.5 5 7.5 5 7.5 5 42.5   10/26/20 2.3 At goal 5 7.5 5 7.5 5 7.5 5 42.5   10/20/20 3.1 At goal 5 7.5 5 7.5 5 7.5 5 42.5   10/12/20 3.3 Slightly above goal 5 7.5 5 7.5 5 7.5 5 42.5   10/5/20 2.9 At goal 5 7.5 5 7.5 5 7.5 5 42.5   9/28/20 3.2 Slightly above Goal 5 7.5 5 7.5 5 7.5 5 42.5   9/21/20 3.1 At goal 5 7.5 5 7.5 5 7.5 5 32.5   9/9/20 1.8 Below goal 5 7.5 5 7.5 5 7.5 5 32.5   9/7/20 4.0 Over goal 7.5 hold hold 7.5 5 7.5 5 32.5   8/31/20 1.90 Near goal 7.5 7.5 7.5 7.5 7.5 7.5 7.5 52.5   8/24/20 1.30 Below goal 7.5 7.5 7.5 7.5 7.5 7.5 7.5 49   8/17/20 1.5 Below goal 5 10 7.5 5 5 5 5 42.5   8/10/20 1.9 At goal 5 7.5 7.5 5 5 5 5 40   7/27/20 2.1 At goal 5 7.5 7.5 5 5 5 5 40        Assessment/Plan:    Recent hospitalizations/HC visits None reported    Recent medication changes No   Medications taken regularly that may interact with warfarin or alter INR No significant drug interactions identified   Warfarin dose taken as prescribed Yes   Signs/symptoms of bleeding History of bleeding? No   Vitamin K intake Consistency of servings of green, leafy vegetables per week ? Yes   Recent vomiting/diarrhea/fever None reported   Changes in weight, activity, stress None reported   alcohol use Patient reports having 0 drinks per day   Upcoming surgeries or procedures None reported       Patient's INR is just below goal-gave increased dosing schedule  Patient was also reminded to maintain consistent vitamin K intake and call with any bleeding, medication changes, or fever/vomiting/diarrhea. Next INR check: 3/8/21    Addendum:  3/1/21  Reviewed assessment and plan. Gave increased dosage instructions. Advised to eat a few more greens. INR is within goal, continue current dose  Repeat INR in one week/s. Patient/family instructed.       Jigna Call, CNP

## 2021-03-15 NOTE — PROGRESS NOTES
See anti-coag encounter.     ANTICOAGULATION MONITORING    Skip Cedeño, 1940    Anticoagulation Indication(s):  Afib    Referring Physician:   Dr. Bridgette Douglas  Goal INR Range:  2-3    Duration of Anticoagulation Therapy: indefinite  Home or Lab Draw: Stevie Rutherford  Product patient has at home: warfarin 2 mg    Lab Results   Component Value Date    INR 2.40 03/15/2021    INR 1.90 03/01/2021    INR 3.20 02/22/2021    PROTIME 12.3 12/10/2018    PROTIME 11.5 11/30/2016    PROTIME 10.1 12/18/2014        Component Value Date    INR 2.10 07/27/2020    INR 2.20 07/20/2020    INR 1.40 07/13/2020       INR Summary                          Warfarin regimen (mg)  Date INR A/P Sun Mon Tue Wed Thu Fri Sat Mg/wk                               3/15/21 2.4 At goal 2 3 2 2 2 2 2 15   3/1/21 1.9 Just below goal 2 3 2 2 2 2 2 15   2/22/21  3.2 Just above goal 2 2 2 2 2 2 2 14   2/8/21 2.8 At goal 2 2 2 2 2 2 2 14   2/1/21 2.3 At goal 5 5 5 7.5 5 7.5 5 40   1/25/21 4.0 Above goal 5 hold hold 7.5 5 7.5 5 30   1/18/21  Above goal  5 5 5 7.5 5 7.5 5 40   1/11/21 3.0 At goal 5 7.5 5 7.5 5 7.5 5 42.5   1/6/21 3.3 Above goal 5 7.5 5 7.5  5 7.5 5 42.5   12/28/20 3.1 Just above goal 5 7.5 7.5 7.5 5 7.5 5 45   12/21/20 1.8 Below goal 5 7.5 7.5 7.5 5 7.5 5 45   12/14/20 2.9 At goal 5 7.5 5 7.5 5 7.5 5 42.5   12/7/20 2.8 At goal 5 7.5 5 7.5 5 7.5 5 42.5   11/30/20 2.7 At goal 5 7.5 5 7.5 5 7.5 5 42.5   11/23/20 2.3 At goal 5 7.5 5 7.5 5 7.5 5 42.5   11/16/20 3.2 Slightly over goal  5 7.5 5 7.5 5 7.5 5 42.5   11/9/20 2.9 At goal  5 7.5 5 7.5 5 7.5 5 42.5   11/6/20 2.9 At goal 5 7.5 5 7.5 5 7.5 5 42.5   10/26/20 2.3 At goal 5 7.5 5 7.5 5 7.5 5 42.5   10/20/20 3.1 At goal 5 7.5 5 7.5 5 7.5 5 42.5   10/12/20 3.3 Slightly above goal 5 7.5 5 7.5 5 7.5 5 42.5   10/5/20 2.9 At goal 5 7.5 5 7.5 5 7.5 5 42.5   9/28/20 3.2 Slightly above Goal 5 7.5 5 7.5 5 7.5 5 42.5   9/21/20 3.1 At goal 5 7.5 5 7.5 5 7.5 5 32.5   9/9/20 1.8 Below goal 5 7.5 5 7.5 5 7.5 5 32.5 9/7/20 4.0 Over goal 7.5 hold hold 7.5 5 7.5 5 32.5   8/31/20 1.90 Near goal 7.5 7.5 7.5 7.5 7.5 7.5 7.5 52.5   8/24/20 1.30 Below goal 7.5 7.5 7.5 7.5 7.5 7.5 7.5 49   8/17/20 1.5 Below goal 5 10 7.5 5 5 5 5 42.5   8/10/20 1.9 At goal 5 7.5 7.5 5 5 5 5 40   7/27/20 2.1 At goal 5 7.5 7.5 5 5 5 5 40        Assessment/Plan:    Recent hospitalizations/HC visits None reported    Recent medication changes No   Medications taken regularly that may interact with warfarin or alter INR No significant drug interactions identified   Warfarin dose taken as prescribed Yes   Signs/symptoms of bleeding History of bleeding? No   Vitamin K intake Consistency of servings of green, leafy vegetables per week ? Yes   Recent vomiting/diarrhea/fever None reported   Changes in weight, activity, stress None reported   alcohol use Patient reports having 0 drinks per day   Upcoming surgeries or procedures None reported       Patient's INR is at goal-same dosing schedule  Patient was also reminded to maintain consistent vitamin K intake and call with any bleeding, medication changes, or fever/vomiting/diarrhea. Next INR check: 3/22/21    Addendum:  3/15/21  Reviewed assessment and plan. Gave increased dosage instructions. Advised to eat a few more greens. INR is within goal, continue current dose  Repeat INR in one week/s. Patient/family instructed.       Theresa Phillips, CNP

## 2021-03-22 NOTE — PROGRESS NOTES
9/7/20 4.0 Over goal 7.5 hold hold 7.5 5 7.5 5 32.5   8/31/20 1.90 Near goal 7.5 7.5 7.5 7.5 7.5 7.5 7.5 52.5   8/24/20 1.30 Below goal 7.5 7.5 7.5 7.5 7.5 7.5 7.5 49   8/17/20 1.5 Below goal 5 10 7.5 5 5 5 5 42.5   8/10/20 1.9 At goal 5 7.5 7.5 5 5 5 5 40   7/27/20 2.1 At goal 5 7.5 7.5 5 5 5 5 40        Assessment/Plan:    Recent hospitalizations/HC visits None reported    Recent medication changes No   Medications taken regularly that may interact with warfarin or alter INR No significant drug interactions identified   Warfarin dose taken as prescribed Yes   Signs/symptoms of bleeding History of bleeding? No   Vitamin K intake Consistency of servings of green, leafy vegetables per week ? Yes   Recent vomiting/diarrhea/fever None reported   Changes in weight, activity, stress None reported   alcohol use Patient reports having 0 drinks per day   Upcoming surgeries or procedures None reported       Patient's INR is at goal-same dosing schedule  Patient was also reminded to maintain consistent vitamin K intake and call with any bleeding, medication changes, or fever/vomiting/diarrhea. Next INR check: 3/29/21    Addendum:  3/22/21    INR is within goal, continue current dose  Repeat INR in one week/s. Patient/family instructed.       Joceline Oreilly, CNP

## 2021-03-29 NOTE — PROGRESS NOTES
See anti-coag encounter.     ANTICOAGULATION MONITORING    Lilia Cesar, 1940    Anticoagulation Indication(s):  Afib    Referring Physician:   Dr. Anabel Howard  Goal INR Range:  2-3    Duration of Anticoagulation Therapy: indefinite  Home or Lab Draw: Hector Childers  Product patient has at home: warfarin 2 mg    Lab Results   Component Value Date    INR 2.60 03/29/2021    INR 2.80 03/22/2021    INR 2.40 03/15/2021    PROTIME 12.3 12/10/2018    PROTIME 11.5 11/30/2016    PROTIME 10.1 12/18/2014        Component Value Date    INR 2.10 07/27/2020    INR 2.20 07/20/2020    INR 1.40 07/13/2020       INR Summary                          Warfarin regimen (mg)  Date INR A/P Sun Mon Tue Wed Thu Fri Sat Mg/wk                                                                                   3/29/21 2.6 At goal 2 3 2 2 2 2 2 15   3/15/21 2.4 At goal 2 3 2 2 2 2 2 15   3/1/21 1.9 Just below goal 2 3 2 2 2 2 2 15   2/22/21  3.2 Just above goal 2 2 2 2 2 2 2 14   2/8/21 2.8 At goal 2 2 2 2 2 2 2 14   2/1/21 2.3 At goal 5 5 5 7.5 5 7.5 5 40   1/25/21 4.0 Above goal 5 hold hold 7.5 5 7.5 5 30   1/18/21  Above goal  5 5 5 7.5 5 7.5 5 40   1/11/21 3.0 At goal 5 7.5 5 7.5 5 7.5 5 42.5   1/6/21 3.3 Above goal 5 7.5 5 7.5  5 7.5 5 42.5   12/28/20 3.1 Just above goal 5 7.5 7.5 7.5 5 7.5 5 45   12/21/20 1.8 Below goal 5 7.5 7.5 7.5 5 7.5 5 45   12/14/20 2.9 At goal 5 7.5 5 7.5 5 7.5 5 42.5   12/7/20 2.8 At goal 5 7.5 5 7.5 5 7.5 5 42.5   11/30/20 2.7 At goal 5 7.5 5 7.5 5 7.5 5 42.5   11/23/20 2.3 At goal 5 7.5 5 7.5 5 7.5 5 42.5   11/16/20 3.2 Slightly over goal  5 7.5 5 7.5 5 7.5 5 42.5   11/9/20 2.9 At goal  5 7.5 5 7.5 5 7.5 5 42.5   11/6/20 2.9 At goal 5 7.5 5 7.5 5 7.5 5 42.5   10/26/20 2.3 At goal 5 7.5 5 7.5 5 7.5 5 42.5   10/20/20 3.1 At goal 5 7.5 5 7.5 5 7.5 5 42.5   10/12/20 3.3 Slightly above goal 5 7.5 5 7.5 5 7.5 5 42.5   10/5/20 2.9 At goal 5 7.5 5 7.5 5 7.5 5 42.5   9/28/20 3.2 Slightly above Goal 5 7.5 5 7.5 5 7.5 5 42.5   9/21/20

## 2021-04-05 NOTE — PROGRESS NOTES
See anti-coag encounter.     ANTICOAGULATION MONITORING    Kevin Sherwood, 1940    Anticoagulation Indication(s):  Afib    Referring Physician:   Dr. Kelly Frazier  Goal INR Range:  2-3    Duration of Anticoagulation Therapy: indefinite  Home or Lab Draw: Abel Casper  Product patient has at home: warfarin 2 mg    Lab Results   Component Value Date    INR 2.00 04/05/2021    INR 2.60 03/29/2021    INR 2.80 03/22/2021    PROTIME 12.3 12/10/2018    PROTIME 11.5 11/30/2016    PROTIME 10.1 12/18/2014        Component Value Date    INR 2.10 07/27/2020    INR 2.20 07/20/2020    INR 1.40 07/13/2020       INR Summary                          Warfarin regimen (mg)  Date INR A/P Sun Mon Tue Wed Thu Fri Sat Mg/wk                                                                      4/5/21 2.0 At goal 2 3 2 2 2 2 2 15   3/29/21 2.6 At goal 2 3 2 2 2 2 2 15   3/15/21 2.4 At goal 2 3 2 2 2 2 2 15   3/1/21 1.9 Just below goal 2 3 2 2 2 2 2 15   2/22/21  3.2 Just above goal 2 2 2 2 2 2 2 14   2/8/21 2.8 At goal 2 2 2 2 2 2 2 14   2/1/21 2.3 At goal 5 5 5 7.5 5 7.5 5 40   1/25/21 4.0 Above goal 5 hold hold 7.5 5 7.5 5 30   1/18/21  Above goal  5 5 5 7.5 5 7.5 5 40   1/11/21 3.0 At goal 5 7.5 5 7.5 5 7.5 5 42.5   1/6/21 3.3 Above goal 5 7.5 5 7.5  5 7.5 5 42.5   12/28/20 3.1 Just above goal 5 7.5 7.5 7.5 5 7.5 5 45   12/21/20 1.8 Below goal 5 7.5 7.5 7.5 5 7.5 5 45   12/14/20 2.9 At goal 5 7.5 5 7.5 5 7.5 5 42.5   12/7/20 2.8 At goal 5 7.5 5 7.5 5 7.5 5 42.5   11/30/20 2.7 At goal 5 7.5 5 7.5 5 7.5 5 42.5   11/23/20 2.3 At goal 5 7.5 5 7.5 5 7.5 5 42.5   11/16/20 3.2 Slightly over goal  5 7.5 5 7.5 5 7.5 5 42.5   11/9/20 2.9 At goal  5 7.5 5 7.5 5 7.5 5 42.5   11/6/20 2.9 At goal 5 7.5 5 7.5 5 7.5 5 42.5   10/26/20 2.3 At goal 5 7.5 5 7.5 5 7.5 5 42.5   10/20/20 3.1 At goal 5 7.5 5 7.5 5 7.5 5 42.5   10/12/20 3.3 Slightly above goal 5 7.5 5 7.5 5 7.5 5 42.5   10/5/20 2.9 At goal 5 7.5 5 7.5 5 7.5 5 42.5   9/28/20 3.2 Slightly above Goal 5 7.5 5 7.5 5 7.5 5 42.5   9/21/20 3.1 At goal 5 7.5 5 7.5 5 7.5 5 32.5   9/9/20 1.8 Below goal 5 7.5 5 7.5 5 7.5 5 32.5   9/7/20 4.0 Over goal 7.5 hold hold 7.5 5 7.5 5 32.5   8/31/20 1.90 Near goal 7.5 7.5 7.5 7.5 7.5 7.5 7.5 52.5   8/24/20 1.30 Below goal 7.5 7.5 7.5 7.5 7.5 7.5 7.5 49   8/17/20 1.5 Below goal 5 10 7.5 5 5 5 5 42.5   8/10/20 1.9 At goal 5 7.5 7.5 5 5 5 5 40   7/27/20 2.1 At goal 5 7.5 7.5 5 5 5 5 40        Assessment/Plan:    Recent hospitalizations/HC visits None reported    Recent medication changes No   Medications taken regularly that may interact with warfarin or alter INR No significant drug interactions identified   Warfarin dose taken as prescribed Yes   Signs/symptoms of bleeding History of bleeding? No   Vitamin K intake Consistency of servings of green, leafy vegetables per week ? Yes   Recent vomiting/diarrhea/fever None reported   Changes in weight, activity, stress None reported   alcohol use Patient reports having 0 drinks per day   Upcoming surgeries or procedures None reported       Patient's INR is at goal-same dosing schedule  Patient was also reminded to maintain consistent vitamin K intake and call with any bleeding, medication changes, or fever/vomiting/diarrhea. Next INR check: 4/12/21    Addendum:  4/5/21    INR is within goal, continue current dose  Repeat INR in one week/s. Patient/family instructed.       Yogesh Alfaro, CNP

## 2021-04-12 NOTE — PROGRESS NOTES
See anti-coag encounter.     ANTICOAGULATION MONITORING    Owen Tiffanie, 1940    Anticoagulation Indication(s):  Afib    Referring Physician:   Dr. Cal Maldonado  Goal INR Range:  2-3    Duration of Anticoagulation Therapy: indefinite  Home or Lab Draw: Perez Ott  Product patient has at home: warfarin 2 mg    Lab Results   Component Value Date    INR 2.50 04/12/2021    INR 2.00 04/05/2021    INR 2.60 03/29/2021    PROTIME 12.3 12/10/2018    PROTIME 11.5 11/30/2016    PROTIME 10.1 12/18/2014        Component Value Date    INR 2.10 07/27/2020    INR 2.20 07/20/2020    INR 1.40 07/13/2020       INR Summary                          Warfarin regimen (mg)  Date INR A/P Sun Mon Tue Wed Thu Fri Sat Mg/wk                                                         4/12/21 2.5 At goal 2 3 2 2 2 2 2 15   4/5/21 2.0 At goal 2 3 2 2 2 2 2 15   3/29/21 2.6 At goal 2 3 2 2 2 2 2 15   3/15/21 2.4 At goal 2 3 2 2 2 2 2 15   3/1/21 1.9 Just below goal 2 3 2 2 2 2 2 15   2/22/21  3.2 Just above goal 2 2 2 2 2 2 2 14   2/8/21 2.8 At goal 2 2 2 2 2 2 2 14   2/1/21 2.3 At goal 5 5 5 7.5 5 7.5 5 40   1/25/21 4.0 Above goal 5 hold hold 7.5 5 7.5 5 30   1/18/21  Above goal  5 5 5 7.5 5 7.5 5 40   1/11/21 3.0 At goal 5 7.5 5 7.5 5 7.5 5 42.5   1/6/21 3.3 Above goal 5 7.5 5 7.5  5 7.5 5 42.5   12/28/20 3.1 Just above goal 5 7.5 7.5 7.5 5 7.5 5 45   12/21/20 1.8 Below goal 5 7.5 7.5 7.5 5 7.5 5 45   12/14/20 2.9 At goal 5 7.5 5 7.5 5 7.5 5 42.5   12/7/20 2.8 At goal 5 7.5 5 7.5 5 7.5 5 42.5   11/30/20 2.7 At goal 5 7.5 5 7.5 5 7.5 5 42.5   11/23/20 2.3 At goal 5 7.5 5 7.5 5 7.5 5 42.5   11/16/20 3.2 Slightly over goal  5 7.5 5 7.5 5 7.5 5 42.5   11/9/20 2.9 At goal  5 7.5 5 7.5 5 7.5 5 42.5   11/6/20 2.9 At goal 5 7.5 5 7.5 5 7.5 5 42.5   10/26/20 2.3 At goal 5 7.5 5 7.5 5 7.5 5 42.5   10/20/20 3.1 At goal 5 7.5 5 7.5 5 7.5 5 42.5   10/12/20 3.3 Slightly above goal 5 7.5 5 7.5 5 7.5 5 42.5   10/5/20 2.9 At goal 5 7.5 5 7.5 5 7.5 5 42.5   9/28/20 3.2 Slightly above Goal 5 7.5 5 7.5 5 7.5 5 42.5   9/21/20 3.1 At goal 5 7.5 5 7.5 5 7.5 5 32.5   9/9/20 1.8 Below goal 5 7.5 5 7.5 5 7.5 5 32.5   9/7/20 4.0 Over goal 7.5 hold hold 7.5 5 7.5 5 32.5   8/31/20 1.90 Near goal 7.5 7.5 7.5 7.5 7.5 7.5 7.5 52.5   8/24/20 1.30 Below goal 7.5 7.5 7.5 7.5 7.5 7.5 7.5 49   8/17/20 1.5 Below goal 5 10 7.5 5 5 5 5 42.5   8/10/20 1.9 At goal 5 7.5 7.5 5 5 5 5 40   7/27/20 2.1 At goal 5 7.5 7.5 5 5 5 5 40        Assessment/Plan:    Recent hospitalizations/HC visits None reported    Recent medication changes No   Medications taken regularly that may interact with warfarin or alter INR No significant drug interactions identified   Warfarin dose taken as prescribed Yes   Signs/symptoms of bleeding History of bleeding? No   Vitamin K intake Consistency of servings of green, leafy vegetables per week ? Yes   Recent vomiting/diarrhea/fever None reported   Changes in weight, activity, stress None reported   alcohol use Patient reports having 0 drinks per day   Upcoming surgeries or procedures None reported       Patient's INR is at goal-same dosing schedule  Patient was also reminded to maintain consistent vitamin K intake and call with any bleeding, medication changes, or fever/vomiting/diarrhea. Next INR check: 4/19/21    Addendum:  4/12/21    INR is within goal, continue current dose  Repeat INR in one week/s. Patient/family instructed.       Isha Haji CNP

## 2021-04-19 NOTE — PROGRESS NOTES
See anti-coag encounter.     ANTICOAGULATION MONITORING    Sagar Thompson, 1940    Anticoagulation Indication(s):  Afib    Referring Physician:   Dr. Londono Short  Goal INR Range:  2-3    Duration of Anticoagulation Therapy: indefinite  Home or Lab Draw: [facility]ighter  Product patient has at home: warfarin 2 mg    Lab Results   Component Value Date    INR 2.00 04/19/2021    INR 2.50 04/12/2021    INR 2.00 04/05/2021    PROTIME 12.3 12/10/2018    PROTIME 11.5 11/30/2016    PROTIME 10.1 12/18/2014        Component Value Date    INR 2.10 07/27/2020    INR 2.20 07/20/2020    INR 1.40 07/13/2020       INR Summary                          Warfarin regimen (mg)  Date INR A/P Sun Mon Tue Wed Thu Fri Sat Mg/wk                                                         4/12/21 2.5 At goal 2 3 2 2 2 2 2 15   4/5/21 2.0 At goal 2 3 2 2 2 2 2 15   3/29/21 2.6 At goal 2 3 2 2 2 2 2 15   3/15/21 2.4 At goal 2 3 2 2 2 2 2 15   3/1/21 1.9 Just below goal 2 3 2 2 2 2 2 15   2/22/21  3.2 Just above goal 2 2 2 2 2 2 2 14   2/8/21 2.8 At goal 2 2 2 2 2 2 2 14   2/1/21 2.3 At goal 5 5 5 7.5 5 7.5 5 40   1/25/21 4.0 Above goal 5 hold hold 7.5 5 7.5 5 30   1/18/21  Above goal  5 5 5 7.5 5 7.5 5 40   1/11/21 3.0 At goal 5 7.5 5 7.5 5 7.5 5 42.5   1/6/21 3.3 Above goal 5 7.5 5 7.5  5 7.5 5 42.5   12/28/20 3.1 Just above goal 5 7.5 7.5 7.5 5 7.5 5 45   12/21/20 1.8 Below goal 5 7.5 7.5 7.5 5 7.5 5 45   12/14/20 2.9 At goal 5 7.5 5 7.5 5 7.5 5 42.5   12/7/20 2.8 At goal 5 7.5 5 7.5 5 7.5 5 42.5   11/30/20 2.7 At goal 5 7.5 5 7.5 5 7.5 5 42.5   11/23/20 2.3 At goal 5 7.5 5 7.5 5 7.5 5 42.5   11/16/20 3.2 Slightly over goal  5 7.5 5 7.5 5 7.5 5 42.5   11/9/20 2.9 At goal  5 7.5 5 7.5 5 7.5 5 42.5   11/6/20 2.9 At goal 5 7.5 5 7.5 5 7.5 5 42.5   10/26/20 2.3 At goal 5 7.5 5 7.5 5 7.5 5 42.5   10/20/20 3.1 At goal 5 7.5 5 7.5 5 7.5 5 42.5   10/12/20 3.3 Slightly above goal 5 7.5 5 7.5 5 7.5 5 42.5   10/5/20 2.9 At goal 5 7.5 5 7.5 5 7.5 5 42.5   9/28/20 3.2 Slightly above Goal 5 7.5 5 7.5 5 7.5 5 42.5   9/21/20 3.1 At goal 5 7.5 5 7.5 5 7.5 5 32.5   9/9/20 1.8 Below goal 5 7.5 5 7.5 5 7.5 5 32.5   9/7/20 4.0 Over goal 7.5 hold hold 7.5 5 7.5 5 32.5   8/31/20 1.90 Near goal 7.5 7.5 7.5 7.5 7.5 7.5 7.5 52.5   8/24/20 1.30 Below goal 7.5 7.5 7.5 7.5 7.5 7.5 7.5 49   8/17/20 1.5 Below goal 5 10 7.5 5 5 5 5 42.5   8/10/20 1.9 At goal 5 7.5 7.5 5 5 5 5 40   7/27/20 2.1 At goal 5 7.5 7.5 5 5 5 5 40        Assessment/Plan:    Recent hospitalizations/HC visits None reported    Recent medication changes No   Medications taken regularly that may interact with warfarin or alter INR No significant drug interactions identified   Warfarin dose taken as prescribed Yes   Signs/symptoms of bleeding History of bleeding? No   Vitamin K intake Consistency of servings of green, leafy vegetables per week ? Yes   Recent vomiting/diarrhea/fever None reported   Changes in weight, activity, stress None reported   alcohol use Patient reports having 0 drinks per day   Upcoming surgeries or procedures None reported       Patient's INR is at goal-same dosing schedule  Patient was also reminded to maintain consistent vitamin K intake and call with any bleeding, medication changes, or fever/vomiting/diarrhea. Next INR check: 4/19/21    Addendum:  4/19/21    INR is within goal, continue current dose  Repeat INR in one week/s. Patient/family instructed. Kym Shin CNP          See anti-coag encounter.     ANTICOAGULATION MONITORING    Milad Barnes, 1940    Anticoagulation Indication(s):  Afib    Referring Physician:   Dr. Bell Berger  Goal INR Range:  2-3    Duration of Anticoagulation Therapy: indefinite  Home or Lab Draw: [facility]  Product patient has at home: warfarin 2 mg    Lab Results   Component Value Date    INR 2.00 04/19/2021    INR 2.50 04/12/2021    INR 2.00 04/05/2021    PROTIME 12.3 12/10/2018    PROTIME 11.5 11/30/2016    PROTIME 10.1 12/18/2014        Component Value Date INR 2.10 07/27/2020    INR 2.20 07/20/2020    INR 1.40 07/13/2020       INR Summary                          Warfarin regimen (mg)  Date INR A/P Sun Mon Tue Wed Thu Fri Sat Mg/wk                                                         4/12/21 2.5 At goal 2 3 2 2 2 2 2 15   4/5/21 2.0 At goal 2 3 2 2 2 2 2 15   3/29/21 2.6 At goal 2 3 2 2 2 2 2 15   3/15/21 2.4 At goal 2 3 2 2 2 2 2 15   3/1/21 1.9 Just below goal 2 3 2 2 2 2 2 15   2/22/21  3.2 Just above goal 2 2 2 2 2 2 2 14   2/8/21 2.8 At goal 2 2 2 2 2 2 2 14   2/1/21 2.3 At goal 5 5 5 7.5 5 7.5 5 40   1/25/21 4.0 Above goal 5 hold hold 7.5 5 7.5 5 30   1/18/21  Above goal  5 5 5 7.5 5 7.5 5 40   1/11/21 3.0 At goal 5 7.5 5 7.5 5 7.5 5 42.5   1/6/21 3.3 Above goal 5 7.5 5 7.5  5 7.5 5 42.5   12/28/20 3.1 Just above goal 5 7.5 7.5 7.5 5 7.5 5 45   12/21/20 1.8 Below goal 5 7.5 7.5 7.5 5 7.5 5 45   12/14/20 2.9 At goal 5 7.5 5 7.5 5 7.5 5 42.5   12/7/20 2.8 At goal 5 7.5 5 7.5 5 7.5 5 42.5   11/30/20 2.7 At goal 5 7.5 5 7.5 5 7.5 5 42.5   11/23/20 2.3 At goal 5 7.5 5 7.5 5 7.5 5 42.5   11/16/20 3.2 Slightly over goal  5 7.5 5 7.5 5 7.5 5 42.5   11/9/20 2.9 At goal  5 7.5 5 7.5 5 7.5 5 42.5   11/6/20 2.9 At goal 5 7.5 5 7.5 5 7.5 5 42.5   10/26/20 2.3 At goal 5 7.5 5 7.5 5 7.5 5 42.5   10/20/20 3.1 At goal 5 7.5 5 7.5 5 7.5 5 42.5   10/12/20 3.3 Slightly above goal 5 7.5 5 7.5 5 7.5 5 42.5   10/5/20 2.9 At goal 5 7.5 5 7.5 5 7.5 5 42.5   9/28/20 3.2 Slightly above Goal 5 7.5 5 7.5 5 7.5 5 42.5   9/21/20 3.1 At goal 5 7.5 5 7.5 5 7.5 5 32.5   9/9/20 1.8 Below goal 5 7.5 5 7.5 5 7.5 5 32.5   9/7/20 4.0 Over goal 7.5 hold hold 7.5 5 7.5 5 32.5   8/31/20 1.90 Near goal 7.5 7.5 7.5 7.5 7.5 7.5 7.5 52.5   8/24/20 1.30 Below goal 7.5 7.5 7.5 7.5 7.5 7.5 7.5 49   8/17/20 1.5 Below goal 5 10 7.5 5 5 5 5 42.5   8/10/20 1.9 At goal 5 7.5 7.5 5 5 5 5 40   7/27/20 2.1 At goal 5 7.5 7.5 5 5 5 5 40        Assessment/Plan:    Recent hospitalizations/HC visits None reported Recent medication changes No   Medications taken regularly that may interact with warfarin or alter INR No significant drug interactions identified   Warfarin dose taken as prescribed Yes   Signs/symptoms of bleeding History of bleeding? No   Vitamin K intake Consistency of servings of green, leafy vegetables per week ? Yes   Recent vomiting/diarrhea/fever None reported   Changes in weight, activity, stress None reported   alcohol use Patient reports having 0 drinks per day   Upcoming surgeries or procedures None reported       Patient's INR is at goal-same dosing schedule  Patient was also reminded to maintain consistent vitamin K intake and call with any bleeding, medication changes, or fever/vomiting/diarrhea. Next INR check: 4/19/21    Addendum:  4/19/21    INR is within goal, continue current dose  Repeat INR in one week/s. Patient/family instructed. Franko Jurado CNP          See anti-coag encounter.     ANTICOAGULATION MONITORING    Katy Burroughs, 1940    Anticoagulation Indication(s):  Afib    Referring Physician:   Dr. Tri Luis  Goal INR Range:  2-3    Duration of Anticoagulation Therapy: indefinite  Home or Lab Draw: [facility]  Product patient has at home: warfarin 2 mg    Lab Results   Component Value Date    INR 2.00 04/19/2021    INR 2.50 04/12/2021    INR 2.00 04/05/2021    PROTIME 12.3 12/10/2018    PROTIME 11.5 11/30/2016    PROTIME 10.1 12/18/2014        Component Value Date    INR 2.10 07/27/2020    INR 2.20 07/20/2020    INR 1.40 07/13/2020       INR Summary                          Warfarin regimen (mg)  Date INR A/P Sun Mon Tue Wed Thu Fri Sat Mg/wk                                                                      4/5/21 2.0 At goal 2 3 2 2 2 2 2 15   3/29/21 2.6 At goal 2 3 2 2 2 2 2 15   3/15/21 2.4 At goal 2 3 2 2 2 2 2 15   3/1/21 1.9 Just below goal 2 3 2 2 2 2 2 15   2/22/21  3.2 Just above goal 2 2 2 2 2 2 2 14   2/8/21 2.8 At goal 2 2 2 2 2 2 2 14   2/1/21 2.3 At goal 5 5 5 7.5 5 7.5 5 40   1/25/21 4.0 Above goal 5 hold hold 7.5 5 7.5 5 30   1/18/21  Above goal  5 5 5 7.5 5 7.5 5 40   1/11/21 3.0 At goal 5 7.5 5 7.5 5 7.5 5 42.5   1/6/21 3.3 Above goal 5 7.5 5 7.5  5 7.5 5 42.5   12/28/20 3.1 Just above goal 5 7.5 7.5 7.5 5 7.5 5 45   12/21/20 1.8 Below goal 5 7.5 7.5 7.5 5 7.5 5 45   12/14/20 2.9 At goal 5 7.5 5 7.5 5 7.5 5 42.5   12/7/20 2.8 At goal 5 7.5 5 7.5 5 7.5 5 42.5   11/30/20 2.7 At goal 5 7.5 5 7.5 5 7.5 5 42.5   11/23/20 2.3 At goal 5 7.5 5 7.5 5 7.5 5 42.5   11/16/20 3.2 Slightly over goal  5 7.5 5 7.5 5 7.5 5 42.5   11/9/20 2.9 At goal  5 7.5 5 7.5 5 7.5 5 42.5   11/6/20 2.9 At goal 5 7.5 5 7.5 5 7.5 5 42.5   10/26/20 2.3 At goal 5 7.5 5 7.5 5 7.5 5 42.5   10/20/20 3.1 At goal 5 7.5 5 7.5 5 7.5 5 42.5   10/12/20 3.3 Slightly above goal 5 7.5 5 7.5 5 7.5 5 42.5   10/5/20 2.9 At goal 5 7.5 5 7.5 5 7.5 5 42.5   9/28/20 3.2 Slightly above Goal 5 7.5 5 7.5 5 7.5 5 42.5   9/21/20 3.1 At goal 5 7.5 5 7.5 5 7.5 5 32.5   9/9/20 1.8 Below goal 5 7.5 5 7.5 5 7.5 5 32.5   9/7/20 4.0 Over goal 7.5 hold hold 7.5 5 7.5 5 32.5   8/31/20 1.90 Near goal 7.5 7.5 7.5 7.5 7.5 7.5 7.5 52.5   8/24/20 1.30 Below goal 7.5 7.5 7.5 7.5 7.5 7.5 7.5 49   8/17/20 1.5 Below goal 5 10 7.5 5 5 5 5 42.5   8/10/20 1.9 At goal 5 7.5 7.5 5 5 5 5 40   7/27/20 2.1 At goal 5 7.5 7.5 5 5 5 5 40        Assessment/Plan:    Recent hospitalizations/HC visits None reported    Recent medication changes No   Medications taken regularly that may interact with warfarin or alter INR No significant drug interactions identified   Warfarin dose taken as prescribed Yes   Signs/symptoms of bleeding History of bleeding? No   Vitamin K intake Consistency of servings of green, leafy vegetables per week ?  Yes   Recent vomiting/diarrhea/fever None reported   Changes in weight, activity, stress None reported   alcohol use Patient reports having 0 drinks per day   Upcoming surgeries or procedures None reported       Patient's INR is Slightly over goal  5 7.5 5 7.5 5 7.5 5 42.5   11/9/20 2.9 At goal  5 7.5 5 7.5 5 7.5 5 42.5   11/6/20 2.9 At goal 5 7.5 5 7.5 5 7.5 5 42.5   10/26/20 2.3 At goal 5 7.5 5 7.5 5 7.5 5 42.5   10/20/20 3.1 At goal 5 7.5 5 7.5 5 7.5 5 42.5   10/12/20 3.3 Slightly above goal 5 7.5 5 7.5 5 7.5 5 42.5   10/5/20 2.9 At goal 5 7.5 5 7.5 5 7.5 5 42.5   9/28/20 3.2 Slightly above Goal 5 7.5 5 7.5 5 7.5 5 42.5   9/21/20 3.1 At goal 5 7.5 5 7.5 5 7.5 5 32.5   9/9/20 1.8 Below goal 5 7.5 5 7.5 5 7.5 5 32.5   9/7/20 4.0 Over goal 7.5 hold hold 7.5 5 7.5 5 32.5   8/31/20 1.90 Near goal 7.5 7.5 7.5 7.5 7.5 7.5 7.5 52.5   8/24/20 1.30 Below goal 7.5 7.5 7.5 7.5 7.5 7.5 7.5 49   8/17/20 1.5 Below goal 5 10 7.5 5 5 5 5 42.5   8/10/20 1.9 At goal 5 7.5 7.5 5 5 5 5 40   7/27/20 2.1 At goal 5 7.5 7.5 5 5 5 5 40        Assessment/Plan:    Recent hospitalizations/HC visits None reported    Recent medication changes No   Medications taken regularly that may interact with warfarin or alter INR No significant drug interactions identified   Warfarin dose taken as prescribed Yes   Signs/symptoms of bleeding History of bleeding? No   Vitamin K intake Consistency of servings of green, leafy vegetables per week ? Yes   Recent vomiting/diarrhea/fever None reported   Changes in weight, activity, stress None reported   alcohol use Patient reports having 0 drinks per day   Upcoming surgeries or procedures None reported       Patient's INR is at goal-same dosing schedule  Patient was also reminded to maintain consistent vitamin K intake and call with any bleeding, medication changes, or fever/vomiting/diarrhea. Next INR check: 4/26/21    Addendum:  4/19/21    INR is within goal, continue current dose  Repeat INR in one week/s. Patient/family instructed.       Robert Santos, CNP

## 2021-04-26 NOTE — PROGRESS NOTES
Slightly above Goal 5 7.5 5 7.5 5 7.5 5 42.5   9/21/20 3.1 At goal 5 7.5 5 7.5 5 7.5 5 32.5   9/9/20 1.8 Below goal 5 7.5 5 7.5 5 7.5 5 32.5   9/7/20 4.0 Over goal 7.5 hold hold 7.5 5 7.5 5 32.5   8/31/20 1.90 Near goal 7.5 7.5 7.5 7.5 7.5 7.5 7.5 52.5   8/24/20 1.30 Below goal 7.5 7.5 7.5 7.5 7.5 7.5 7.5 49   8/17/20 1.5 Below goal 5 10 7.5 5 5 5 5 42.5   8/10/20 1.9 At goal 5 7.5 7.5 5 5 5 5 40   7/27/20 2.1 At goal 5 7.5 7.5 5 5 5 5 40        Assessment/Plan:    Recent hospitalizations/HC visits None reported    Recent medication changes No   Medications taken regularly that may interact with warfarin or alter INR No significant drug interactions identified   Warfarin dose taken as prescribed Yes   Signs/symptoms of bleeding History of bleeding? No   Vitamin K intake Consistency of servings of green, leafy vegetables per week ? Yes   Recent vomiting/diarrhea/fever None reported   Changes in weight, activity, stress None reported   alcohol use Patient reports having 0 drinks per day   Upcoming surgeries or procedures None reported       Patient's INR is at goal-same dosing schedule  Patient was also reminded to maintain consistent vitamin K intake and call with any bleeding, medication changes, or fever/vomiting/diarrhea. Next INR check: 4/19/21    Addendum:  4/26/21    INR is within goal, continue current dose  Repeat INR in one week/s. Patient/family instructed. Josh Zavala CNP          See anti-coag encounter.     ANTICOAGULATION MONITORING    Page Patel, 1940    Anticoagulation Indication(s):  Afib    Referring Physician:   Dr. Irma Loya  Goal INR Range:  2-3    Duration of Anticoagulation Therapy: indefinite  Home or Lab Draw: [facility]  Product patient has at home: warfarin 2 mg    Lab Results   Component Value Date    INR 2.40 04/26/2021    INR 2.00 04/19/2021    INR 2.50 04/12/2021    PROTIME 12.3 12/10/2018    PROTIME 11.5 11/30/2016    PROTIME 10.1 12/18/2014        Component Value Date INR 2.10 07/27/2020    INR 2.20 07/20/2020    INR 1.40 07/13/2020       INR Summary                          Warfarin regimen (mg)  Date INR A/P Sun Mon Tue Wed Thu Fri Sat Mg/wk                                                         4/12/21 2.5 At goal 2 3 2 2 2 2 2 15   4/5/21 2.0 At goal 2 3 2 2 2 2 2 15   3/29/21 2.6 At goal 2 3 2 2 2 2 2 15   3/15/21 2.4 At goal 2 3 2 2 2 2 2 15   3/1/21 1.9 Just below goal 2 3 2 2 2 2 2 15   2/22/21  3.2 Just above goal 2 2 2 2 2 2 2 14   2/8/21 2.8 At goal 2 2 2 2 2 2 2 14   2/1/21 2.3 At goal 5 5 5 7.5 5 7.5 5 40   1/25/21 4.0 Above goal 5 hold hold 7.5 5 7.5 5 30   1/18/21  Above goal  5 5 5 7.5 5 7.5 5 40   1/11/21 3.0 At goal 5 7.5 5 7.5 5 7.5 5 42.5   1/6/21 3.3 Above goal 5 7.5 5 7.5  5 7.5 5 42.5   12/28/20 3.1 Just above goal 5 7.5 7.5 7.5 5 7.5 5 45   12/21/20 1.8 Below goal 5 7.5 7.5 7.5 5 7.5 5 45   12/14/20 2.9 At goal 5 7.5 5 7.5 5 7.5 5 42.5   12/7/20 2.8 At goal 5 7.5 5 7.5 5 7.5 5 42.5   11/30/20 2.7 At goal 5 7.5 5 7.5 5 7.5 5 42.5   11/23/20 2.3 At goal 5 7.5 5 7.5 5 7.5 5 42.5   11/16/20 3.2 Slightly over goal  5 7.5 5 7.5 5 7.5 5 42.5   11/9/20 2.9 At goal  5 7.5 5 7.5 5 7.5 5 42.5   11/6/20 2.9 At goal 5 7.5 5 7.5 5 7.5 5 42.5   10/26/20 2.3 At goal 5 7.5 5 7.5 5 7.5 5 42.5   10/20/20 3.1 At goal 5 7.5 5 7.5 5 7.5 5 42.5   10/12/20 3.3 Slightly above goal 5 7.5 5 7.5 5 7.5 5 42.5   10/5/20 2.9 At goal 5 7.5 5 7.5 5 7.5 5 42.5   9/28/20 3.2 Slightly above Goal 5 7.5 5 7.5 5 7.5 5 42.5   9/21/20 3.1 At goal 5 7.5 5 7.5 5 7.5 5 32.5   9/9/20 1.8 Below goal 5 7.5 5 7.5 5 7.5 5 32.5   9/7/20 4.0 Over goal 7.5 hold hold 7.5 5 7.5 5 32.5   8/31/20 1.90 Near goal 7.5 7.5 7.5 7.5 7.5 7.5 7.5 52.5   8/24/20 1.30 Below goal 7.5 7.5 7.5 7.5 7.5 7.5 7.5 49   8/17/20 1.5 Below goal 5 10 7.5 5 5 5 5 42.5   8/10/20 1.9 At goal 5 7.5 7.5 5 5 5 5 40   7/27/20 2.1 At goal 5 7.5 7.5 5 5 5 5 40        Assessment/Plan:    Recent hospitalizations/HC visits None reported 7.5 5 7.5 5 40   1/25/21 4.0 Above goal 5 hold hold 7.5 5 7.5 5 30   1/18/21  Above goal  5 5 5 7.5 5 7.5 5 40   1/11/21 3.0 At goal 5 7.5 5 7.5 5 7.5 5 42.5   1/6/21 3.3 Above goal 5 7.5 5 7.5  5 7.5 5 42.5   12/28/20 3.1 Just above goal 5 7.5 7.5 7.5 5 7.5 5 45   12/21/20 1.8 Below goal 5 7.5 7.5 7.5 5 7.5 5 45   12/14/20 2.9 At goal 5 7.5 5 7.5 5 7.5 5 42.5   12/7/20 2.8 At goal 5 7.5 5 7.5 5 7.5 5 42.5   11/30/20 2.7 At goal 5 7.5 5 7.5 5 7.5 5 42.5   11/23/20 2.3 At goal 5 7.5 5 7.5 5 7.5 5 42.5   11/16/20 3.2 Slightly over goal  5 7.5 5 7.5 5 7.5 5 42.5   11/9/20 2.9 At goal  5 7.5 5 7.5 5 7.5 5 42.5   11/6/20 2.9 At goal 5 7.5 5 7.5 5 7.5 5 42.5   10/26/20 2.3 At goal 5 7.5 5 7.5 5 7.5 5 42.5   10/20/20 3.1 At goal 5 7.5 5 7.5 5 7.5 5 42.5   10/12/20 3.3 Slightly above goal 5 7.5 5 7.5 5 7.5 5 42.5   10/5/20 2.9 At goal 5 7.5 5 7.5 5 7.5 5 42.5   9/28/20 3.2 Slightly above Goal 5 7.5 5 7.5 5 7.5 5 42.5   9/21/20 3.1 At goal 5 7.5 5 7.5 5 7.5 5 32.5   9/9/20 1.8 Below goal 5 7.5 5 7.5 5 7.5 5 32.5   9/7/20 4.0 Over goal 7.5 hold hold 7.5 5 7.5 5 32.5   8/31/20 1.90 Near goal 7.5 7.5 7.5 7.5 7.5 7.5 7.5 52.5   8/24/20 1.30 Below goal 7.5 7.5 7.5 7.5 7.5 7.5 7.5 49   8/17/20 1.5 Below goal 5 10 7.5 5 5 5 5 42.5   8/10/20 1.9 At goal 5 7.5 7.5 5 5 5 5 40   7/27/20 2.1 At goal 5 7.5 7.5 5 5 5 5 40        Assessment/Plan:    Recent hospitalizations/HC visits None reported    Recent medication changes No   Medications taken regularly that may interact with warfarin or alter INR No significant drug interactions identified   Warfarin dose taken as prescribed Yes   Signs/symptoms of bleeding History of bleeding? No   Vitamin K intake Consistency of servings of green, leafy vegetables per week ?  Yes   Recent vomiting/diarrhea/fever None reported   Changes in weight, activity, stress None reported   alcohol use Patient reports having 0 drinks per day   Upcoming surgeries or procedures None reported       Patient's INR is at goal-same dosing schedule  Patient was also reminded to maintain consistent vitamin K intake and call with any bleeding, medication changes, or fever/vomiting/diarrhea. Next INR check: 4/12/21    Addendum:  4/26/21    INR is within goal, continue current dose  Repeat INR in one week/s. Patient/family instructed. Renetta Aguirre CNP        See anti-coag encounter.     ANTICOAGULATION MONITORING    Yuki Jones, 1940    Anticoagulation Indication(s):  Afib    Referring Physician:   Dr. Mykel Jasso  Goal INR Range:  2-3    Duration of Anticoagulation Therapy: indefinite  Home or Lab Draw: [facility]  Product patient has at home: warfarin 2 mg    Lab Results   Component Value Date    INR 2.40 04/26/2021    INR 2.00 04/19/2021    INR 2.50 04/12/2021    PROTIME 12.3 12/10/2018    PROTIME 11.5 11/30/2016    PROTIME 10.1 12/18/2014        Component Value Date    INR 2.10 07/27/2020    INR 2.20 07/20/2020    INR 1.40 07/13/2020       INR Summary                          Warfarin regimen (mg)  Date INR A/P Sun Mon Tue Wed Thu Fri Sat Mg/wk                                            4/26/21 2.4 At goal 2 3 2 2 2 2 2 15   4/19/21 2.0 At goal 2 3 2 2 2 2 2 15   4/5/21 2.0 At goal 2 3 2 2 2 2 2 15   3/29/21 2.6 At goal 2 3 2 2 2 2 2 15   3/15/21 2.4 At goal 2 3 2 2 2 2 2 15   3/1/21 1.9 Just below goal 2 3 2 2 2 2 2 15   2/22/21  3.2 Just above goal 2 2 2 2 2 2 2 14   2/8/21 2.8 At goal 2 2 2 2 2 2 2 14   2/1/21 2.3 At goal 5 5 5 7.5 5 7.5 5 40   1/25/21 4.0 Above goal 5 hold hold 7.5 5 7.5 5 30   1/18/21  Above goal  5 5 5 7.5 5 7.5 5 40   1/11/21 3.0 At goal 5 7.5 5 7.5 5 7.5 5 42.5   1/6/21 3.3 Above goal 5 7.5 5 7.5  5 7.5 5 42.5   12/28/20 3.1 Just above goal 5 7.5 7.5 7.5 5 7.5 5 45   12/21/20 1.8 Below goal 5 7.5 7.5 7.5 5 7.5 5 45   12/14/20 2.9 At goal 5 7.5 5 7.5 5 7.5 5 42.5   12/7/20 2.8 At goal 5 7.5 5 7.5 5 7.5 5 42.5   11/30/20 2.7 At goal 5 7.5 5 7.5 5 7.5 5 42.5   11/23/20 2.3 At goal 5 7.5 5 7.5 5 7.5 5

## 2021-05-03 NOTE — PROGRESS NOTES
See anti-coag encounter. ANTICOAGULATION MONITORING    [facility], 1940    Anticoagulation Indication(s):  Afib    Referring Physician:   Dr. Abran Camargo  Goal INR Range:  2-3    Duration of Anticoagulation Therapy: indefinite  Home or Lab Draw: Kevin Paulson  Product patient has at home: warfarin 2 mg    Lab Results   Component Value Date    INR 1.50 05/03/2021    INR 2.40 04/26/2021    INR 2.00 04/19/2021    PROTIME 12.3 12/10/2018    PROTIME 11.5 11/30/2016    PROTIME 10.1 12/18/2014        Component Value Date    INR 2.10 07/27/2020    INR 2.20 07/20/2020    INR 1.40 07/13/2020       INR Summary                          Warfarin regimen (mg)  Date INR A/P Sun Mon Tue Wed Thu Fri Sat Mg/wk                                                                                                                                                                              5/3/21 1.5 Below goal 2 3 3 2 2 2 2 16   4/12/21 2.5 At goal 2 3 2 2 2 2 2 15   4/5/21 2.0 At goal 2 3 2 2 2 2 2 15   3/29/21 2.6 At goal 2 3 2 2 2 2 2 15   3/15/21 2.4 At goal 2 3 2 2 2 2 2 15   3/1/21 1.9 Just below goal 2 3 2 2 2 2 2 15   2/22/21  3.2 Just above goal 2 2 2 2 2 2 2 14   2/8/21 2.8 At goal 2 2 2 2 2 2 2 14   2/1/21 2.3 At goal 5 5 5 7.5 5 7.5 5 40   1/25/21 4.0 Above goal 5 hold hold 7.5 5 7.5 5 30   1/18/21  Above goal  5 5 5 7.5 5 7.5 5 40   1/11/21 3.0 At goal 5 7.5 5 7.5 5 7.5 5 42.5   1/6/21 3.3 Above goal 5 7.5 5 7.5  5 7.5 5 42.5   12/28/20 3.1 Just above goal 5 7.5 7.5 7.5 5 7.5 5 45     Assessment/Plan:    Recent hospitalizations/HC visits None reported    Recent medication changes No   Medications taken regularly that may interact with warfarin or alter INR No significant drug interactions identified   Warfarin dose taken as prescribed Yes   Signs/symptoms of bleeding History of bleeding? No   Vitamin K intake Consistency of servings of green, leafy vegetables per week ?  Yes   Recent vomiting/diarrhea/fever None reported   Changes in weight, activity, stress None reported   alcohol use Patient reports having 0 drinks per day   Upcoming surgeries or procedures None reported       Patient's INR is not at goal : missed taking 4/30 dose  Patient was also reminded to maintain consistent vitamin K intake and call with any bleeding, medication changes, or fever/vomiting/diarrhea. Next INR check: 5/10/21    Addendum:  5/3/21    INR is below goal, gave increased dosage instructions  Repeat INR in one week/s. Patient/family instructed.       Azael Irby CNP           INR Summary                          Warfarin regimen (mg)  Date INR A/P Sun Mon Tue Wed Thu Fri Sat Mg/wk                                                                      4/5/21 2.0 At goal 2 3 2 2 2 2 2 15   3/29/21 2.6 At goal 2 3 2 2 2 2 2 15   3/15/21 2.4 At goal 2 3 2 2 2 2 2 15   3/1/21 1.9 Just below goal 2 3 2 2 2 2 2 15   2/22/21  3.2 Just above goal 2 2 2 2 2 2 2 14   2/8/21 2.8 At goal 2 2 2 2 2 2 2 14   2/1/21 2.3 At goal 5 5 5 7.5 5 7.5 5 40   1/25/21 4.0 Above goal 5 hold hold 7.5 5 7.5 5 30   1/18/21  Above goal  5 5 5 7.5 5 7.5 5 40   1/11/21 3.0 At goal 5 7.5 5 7.5 5 7.5 5 42.5   1/6/21 3.3 Above goal 5 7.5 5 7.5  5 7.5 5 42.5   12/28/20 3.1 Just above goal 5 7.5 7.5 7.5 5 7.5 5 45   12/21/20 1.8 Below goal 5 7.5 7.5 7.5 5 7.5 5 45   12/14/20 2.9 At goal 5 7.5 5 7.5 5 7.5 5 42.5   12/7/20 2.8 At goal 5 7.5 5 7.5 5 7.5 5 42.5   11/30/20 2.7 At goal 5 7.5 5 7.5 5 7.5 5 42.5   11/23/20 2.3 At goal 5 7.5 5 7.5 5 7.5 5 42.5   11/16/20 3.2 Slightly over goal  5 7.5 5 7.5 5 7.5 5 42.5   11/9/20 2.9 At goal  5 7.5 5 7.5 5 7.5 5 42.5   11/6/20 2.9 At goal 5 7.5 5 7.5 5 7.5 5 42.5   10/26/20 2.3 At goal 5 7.5 5 7.5 5 7.5 5 42.5   10/20/20 3.1 At goal 5 7.5 5 7.5 5 7.5 5 42.5   10/12/20 3.3 Slightly above goal 5 7.5 5 7.5 5 7.5 5 42.5   10/5/20 2.9 At goal 5 7.5 5 7.5 5 7.5 5 42.5   9/28/20 3.2 Slightly above Goal 5 7.5 5 7.5 5 7.5 5 42.5   9/21/20 3.1 At goal 5 7.5 5 7.5 5 7.5 5 32.5   9/9/20 1.8 Below goal 5 7.5 5 7.5 5 7.5 5 32.5   9/7/20 4.0 Over goal 7.5 hold hold 7.5 5 7.5 5 32.5   8/31/20 1.90 Near goal 7.5 7.5 7.5 7.5 7.5 7.5 7.5 52.5   8/24/20 1.30 Below goal 7.5 7.5 7.5 7.5 7.5 7.5 7.5 49   8/17/20 1.5 Below goal 5 10 7.5 5 5 5 5 42.5   8/10/20 1.9 At goal 5 7.5 7.5 5 5 5 5 40   7/27/20 2.1 At goal 5 7.5 7.5 5 5 5 5 40        Assessment/Plan:    Recent hospitalizations/HC visits None reported    Recent medication changes No   Medications taken regularly that may interact with warfarin or alter INR No significant drug interactions identified   Warfarin dose taken as prescribed Yes   Signs/symptoms of bleeding History of bleeding? No   Vitamin K intake Consistency of servings of green, leafy vegetables per week ? Yes   Recent vomiting/diarrhea/fever None reported   Changes in weight, activity, stress None reported   alcohol use Patient reports having 0 drinks per day   Upcoming surgeries or procedures None reported       Patient's INR is at goal-same dosing schedule  Patient was also reminded to maintain consistent vitamin K intake and call with any bleeding, medication changes, or fever/vomiting/diarrhea. Next INR check: 4/12/21    Addendum:  5/3/21    INR is within goal, continue current dose  Repeat INR in one week/s. Patient/family instructed. Ernestine Hargrove CNP        See anti-coag encounter.     ANTICOAGULATION MONITORING    [facility], 1940    Anticoagulation Indication(s):  Afib    Referring Physician:   Dr. Alma Whalen  Goal INR Range:  2-3    Duration of Anticoagulation Therapy: indefinite  Home or Lab Draw: [facility]  Product patient has at home: warfarin 2 mg    Lab Results   Component Value Date    INR 1.50 05/03/2021    INR 2.40 04/26/2021    INR 2.00 04/19/2021    PROTIME 12.3 12/10/2018    PROTIME 11.5 11/30/2016    PROTIME 10.1 12/18/2014        Component Value Date    INR 2.10 07/27/2020    INR 2.20 07/20/2020    INR 1.40 07/13/2020       INR Medications taken regularly that may interact with warfarin or alter INR No significant drug interactions identified   Warfarin dose taken as prescribed Yes   Signs/symptoms of bleeding History of bleeding? No   Vitamin K intake Consistency of servings of green, leafy vegetables per week ? Yes   Recent vomiting/diarrhea/fever None reported   Changes in weight, activity, stress None reported   alcohol use Patient reports having 0 drinks per day   Upcoming surgeries or procedures None reported       Patient's INR is at goal-same dosing schedule  Patient was also reminded to maintain consistent vitamin K intake and call with any bleeding, medication changes, or fever/vomiting/diarrhea. Next INR check: 5/3/21    Addendum:  5/3/21    INR is within goal, continue current dose  Repeat INR in one week/s. Patient/family instructed.       Kanu Patrick, CNP

## 2021-05-11 NOTE — PROGRESS NOTES
See anti-coag encounter. ANTICOAGULATION MONITORING    [facility], 1940    Anticoagulation Indication(s):  Afib    Referring Physician:   Dr. Abran Camargo  Goal INR Range:  2-3    Duration of Anticoagulation Therapy: indefinite  Home or Lab Draw: [facility]  Product patient has at home: warfarin 2 mg    Lab Results   Component Value Date    INR 3.70 05/10/2021    INR 1.50 05/03/2021    INR 2.40 04/26/2021    PROTIME 12.3 12/10/2018    PROTIME 11.5 11/30/2016    PROTIME 10.1 12/18/2014        Component Value Date    INR 2.10 07/27/2020    INR 2.20 07/20/2020    INR 1.40 07/13/2020       INR Summary                          Warfarin regimen (mg)  Date INR A/P Sun Mon Tue Wed Thu Fri Sat Mg/wk                                                                                                                                                                              5/3/21 1.5 Below goal 2 3 3 2 2 2 2 16   4/12/21 2.5 At goal 2 3 2 2 2 2 2 15   4/5/21 2.0 At goal 2 3 2 2 2 2 2 15   3/29/21 2.6 At goal 2 3 2 2 2 2 2 15   3/15/21 2.4 At goal 2 3 2 2 2 2 2 15   3/1/21 1.9 Just below goal 2 3 2 2 2 2 2 15   2/22/21  3.2 Just above goal 2 2 2 2 2 2 2 14   2/8/21 2.8 At goal 2 2 2 2 2 2 2 14   2/1/21 2.3 At goal 5 5 5 7.5 5 7.5 5 40   1/25/21 4.0 Above goal 5 hold hold 7.5 5 7.5 5 30   1/18/21  Above goal  5 5 5 7.5 5 7.5 5 40   1/11/21 3.0 At goal 5 7.5 5 7.5 5 7.5 5 42.5   1/6/21 3.3 Above goal 5 7.5 5 7.5  5 7.5 5 42.5   12/28/20 3.1 Just above goal 5 7.5 7.5 7.5 5 7.5 5 45     Assessment/Plan:    Recent hospitalizations/HC visits None reported    Recent medication changes No   Medications taken regularly that may interact with warfarin or alter INR No significant drug interactions identified   Warfarin dose taken as prescribed Yes   Signs/symptoms of bleeding History of bleeding? No   Vitamin K intake Consistency of servings of green, leafy vegetables per week ?  Yes   Recent vomiting/diarrhea/fever None reported   Changes in weight, activity, stress None reported   alcohol use Patient reports having 0 drinks per day   Upcoming surgeries or procedures None reported       Patient's INR is not at goal : missed taking 4/30 dose  Patient was also reminded to maintain consistent vitamin K intake and call with any bleeding, medication changes, or fever/vomiting/diarrhea. Next INR check: 5/10/21    Addendum:  5/3/21    INR is below goal, gave increased dosage instructions  Repeat INR in one week/s. Patient/family instructed.       George Orta CNP           INR Summary                          Warfarin regimen (mg)  Date INR A/P Sun Mon Tue Wed Thu Fri Sat Mg/wk                                                                      4/5/21 2.0 At goal 2 3 2 2 2 2 2 15   3/29/21 2.6 At goal 2 3 2 2 2 2 2 15   3/15/21 2.4 At goal 2 3 2 2 2 2 2 15   3/1/21 1.9 Just below goal 2 3 2 2 2 2 2 15   2/22/21  3.2 Just above goal 2 2 2 2 2 2 2 14   2/8/21 2.8 At goal 2 2 2 2 2 2 2 14   2/1/21 2.3 At goal 5 5 5 7.5 5 7.5 5 40   1/25/21 4.0 Above goal 5 hold hold 7.5 5 7.5 5 30   1/18/21  Above goal  5 5 5 7.5 5 7.5 5 40   1/11/21 3.0 At goal 5 7.5 5 7.5 5 7.5 5 42.5   1/6/21 3.3 Above goal 5 7.5 5 7.5  5 7.5 5 42.5   12/28/20 3.1 Just above goal 5 7.5 7.5 7.5 5 7.5 5 45   12/21/20 1.8 Below goal 5 7.5 7.5 7.5 5 7.5 5 45   12/14/20 2.9 At goal 5 7.5 5 7.5 5 7.5 5 42.5   12/7/20 2.8 At goal 5 7.5 5 7.5 5 7.5 5 42.5   11/30/20 2.7 At goal 5 7.5 5 7.5 5 7.5 5 42.5   11/23/20 2.3 At goal 5 7.5 5 7.5 5 7.5 5 42.5   11/16/20 3.2 Slightly over goal  5 7.5 5 7.5 5 7.5 5 42.5   11/9/20 2.9 At goal  5 7.5 5 7.5 5 7.5 5 42.5   11/6/20 2.9 At goal 5 7.5 5 7.5 5 7.5 5 42.5   10/26/20 2.3 At goal 5 7.5 5 7.5 5 7.5 5 42.5   10/20/20 3.1 At goal 5 7.5 5 7.5 5 7.5 5 42.5   10/12/20 3.3 Slightly above goal 5 7.5 5 7.5 5 7.5 5 42.5   10/5/20 2.9 At goal 5 7.5 5 7.5 5 7.5 5 42.5   9/28/20 3.2 Slightly above Goal 5 7.5 5 7.5 5 7.5 5 42.5   9/21/20 3.1 At goal 5 7.5 5 7.5 5 7.5 5 32.5   9/9/20 1.8 Below goal 5 7.5 5 7.5 5 7.5 5 32.5   9/7/20 4.0 Over goal 7.5 hold hold 7.5 5 7.5 5 32.5   8/31/20 1.90 Near goal 7.5 7.5 7.5 7.5 7.5 7.5 7.5 52.5   8/24/20 1.30 Below goal 7.5 7.5 7.5 7.5 7.5 7.5 7.5 49   8/17/20 1.5 Below goal 5 10 7.5 5 5 5 5 42.5   8/10/20 1.9 At goal 5 7.5 7.5 5 5 5 5 40   7/27/20 2.1 At goal 5 7.5 7.5 5 5 5 5 40        Assessment/Plan:    Recent hospitalizations/HC visits None reported    Recent medication changes No   Medications taken regularly that may interact with warfarin or alter INR No significant drug interactions identified   Warfarin dose taken as prescribed Yes   Signs/symptoms of bleeding History of bleeding? No   Vitamin K intake Consistency of servings of green, leafy vegetables per week ? Yes   Recent vomiting/diarrhea/fever None reported   Changes in weight, activity, stress None reported   alcohol use Patient reports having 0 drinks per day   Upcoming surgeries or procedures None reported       Patient's INR is at goal-same dosing schedule  Patient was also reminded to maintain consistent vitamin K intake and call with any bleeding, medication changes, or fever/vomiting/diarrhea. Next INR check: 4/12/21    Addendum:  5/11/21    INR is within goal, continue current dose  Repeat INR in one week/s. Patient/family instructed. Kym Shin CNP        See anti-coag encounter.     ANTICOAGULATION MONITORING    [facility], 1940    Anticoagulation Indication(s):  Afib    Referring Physician:   Dr. Bell Berger  Goal INR Range:  2-3    Duration of Anticoagulation Therapy: indefinite  Home or Lab Draw: [facility]  Product patient has at home: warfarin 2 mg    Lab Results   Component Value Date    INR 3.70 05/10/2021    INR 1.50 05/03/2021    INR 2.40 04/26/2021    PROTIME 12.3 12/10/2018    PROTIME 11.5 11/30/2016    PROTIME 10.1 12/18/2014        Component Value Date    INR 2.10 07/27/2020    INR 2.20 07/20/2020    INR 1.40 07/13/2020       INR Summary                          Warfarin regimen (mg)  Date INR A/P Sun Mon Tue Wed Thu Fri Sat Mg/wk                               5/3/21 1.5 Below goal           4/26/21 2.4 At goal 2 3 2 2 2 2 2 15   4/19/21 2.0 At goal 2 3 2 2 2 2 2 15   4/5/21 2.0 At goal 2 3 2 2 2 2 2 15   3/29/21 2.6 At goal 2 3 2 2 2 2 2 15   3/15/21 2.4 At goal 2 3 2 2 2 2 2 15   3/1/21 1.9 Just below goal 2 3 2 2 2 2 2 15   2/22/21  3.2 Just above goal 2 2 2 2 2 2 2 14   2/8/21 2.8 At goal 2 2 2 2 2 2 2 14   2/1/21 2.3 At goal 5 5 5 7.5 5 7.5 5 40   1/25/21 4.0 Above goal 5 hold hold 7.5 5 7.5 5 30   1/18/21  Above goal  5 5 5 7.5 5 7.5 5 40   1/11/21 3.0 At goal 5 7.5 5 7.5 5 7.5 5 42.5   1/6/21 3.3 Above goal 5 7.5 5 7.5  5 7.5 5 42.5   12/28/20 3.1 Just above goal 5 7.5 7.5 7.5 5 7.5 5 45   12/21/20 1.8 Below goal 5 7.5 7.5 7.5 5 7.5 5 45   12/14/20 2.9 At goal 5 7.5 5 7.5 5 7.5 5 42.5   12/7/20 2.8 At goal 5 7.5 5 7.5 5 7.5 5 42.5   11/30/20 2.7 At goal 5 7.5 5 7.5 5 7.5 5 42.5   11/23/20 2.3 At goal 5 7.5 5 7.5 5 7.5 5 42.5   11/16/20 3.2 Slightly over goal  5 7.5 5 7.5 5 7.5 5 42.5   11/9/20 2.9 At goal  5 7.5 5 7.5 5 7.5 5 42.5   11/6/20 2.9 At goal 5 7.5 5 7.5 5 7.5 5 42.5   10/26/20 2.3 At goal 5 7.5 5 7.5 5 7.5 5 42.5   10/20/20 3.1 At goal 5 7.5 5 7.5 5 7.5 5 42.5   10/12/20 3.3 Slightly above goal 5 7.5 5 7.5 5 7.5 5 42.5   10/5/20 2.9 At goal 5 7.5 5 7.5 5 7.5 5 42.5   9/28/20 3.2 Slightly above Goal 5 7.5 5 7.5 5 7.5 5 42.5   9/21/20 3.1 At goal 5 7.5 5 7.5 5 7.5 5 32.5   9/9/20 1.8 Below goal 5 7.5 5 7.5 5 7.5 5 32.5   9/7/20 4.0 Over goal 7.5 hold hold 7.5 5 7.5 5 32.5   8/31/20 1.90 Near goal 7.5 7.5 7.5 7.5 7.5 7.5 7.5 52.5   8/24/20 1.30 Below goal 7.5 7.5 7.5 7.5 7.5 7.5 7.5 49   8/17/20 1.5 Below goal 5 10 7.5 5 5 5 5 42.5   8/10/20 1.9 At goal 5 7.5 7.5 5 5 5 5 40   7/27/20 2.1 At goal 5 7.5 7.5 5 5 5 5 40        Assessment/Plan:    Recent hospitalizations/HC visits None reported    Recent medication changes No Medications taken regularly that may interact with warfarin or alter INR No significant drug interactions identified   Warfarin dose taken as prescribed Yes   Signs/symptoms of bleeding History of bleeding? No   Vitamin K intake Consistency of servings of green, leafy vegetables per week ? Yes   Recent vomiting/diarrhea/fever None reported   Changes in weight, activity, stress None reported   alcohol use Patient reports having 0 drinks per day   Upcoming surgeries or procedures None reported       Patient's INR is at goal-same dosing schedule  Patient was also reminded to maintain consistent vitamin K intake and call with any bleeding, medication changes, or fever/vomiting/diarrhea. Next INR check: 5/3/21    Addendum:  5/11/21    INR is within goal, continue current dose  Repeat INR in one week/s. Patient/family instructed.       Pk Palacios, CNP

## 2021-05-11 NOTE — PROGRESS NOTES
See anti-coag encounter. ANTICOAGULATION MONITORING    [facility], 1940    Anticoagulation Indication(s):  Afib    Referring Physician:   Dr. Tigre Eric  Goal INR Range:  2-3    Duration of Anticoagulation Therapy: indefinite  Home or Lab Draw: [facility]ia [facility]  Product patient has at home: warfarin 2 mg    Lab Results   Component Value Date    INR 3.70 05/10/2021    INR 1.50 05/03/2021    INR 2.40 04/26/2021    PROTIME 12.3 12/10/2018    PROTIME 11.5 11/30/2016    PROTIME 10.1 12/18/2014        Component Value Date    INR 2.10 07/27/2020    INR 2.20 07/20/2020    INR 1.40 07/13/2020       INR Summary                          Warfarin regimen (mg)  Date INR A/P Sun Mon Tue Wed Thu Fri Sat Mg/wk                                                                                                                                                                 5/11/21 3.7 Above goal 2 pt held 2 2 2 2 2 12   5/3/21 1.5 Below goal 2 3 3 2 2 2 2 16   4/12/21 2.5 At goal 2 3 2 2 2 2 2 15   4/5/21 2.0 At goal 2 3 2 2 2 2 2 15   3/29/21 2.6 At goal 2 3 2 2 2 2 2 15   3/15/21 2.4 At goal 2 3 2 2 2 2 2 15   3/1/21 1.9 Just below goal 2 3 2 2 2 2 2 15   2/22/21  3.2 Just above goal 2 2 2 2 2 2 2 14   2/8/21 2.8 At goal 2 2 2 2 2 2 2 14   2/1/21 2.3 At goal 5 5 5 7.5 5 7.5 5 40   1/25/21 4.0 Above goal 5 hold hold 7.5 5 7.5 5 30   1/18/21  Above goal  5 5 5 7.5 5 7.5 5 40   1/11/21 3.0 At goal 5 7.5 5 7.5 5 7.5 5 42.5   1/6/21 3.3 Above goal 5 7.5 5 7.5  5 7.5 5 42.5   12/28/20 3.1 Just above goal 5 7.5 7.5 7.5 5 7.5 5 45     Assessment/Plan:    Recent hospitalizations/HC visits None reported    Recent medication changes No   Medications taken regularly that may interact with warfarin or alter INR No significant drug interactions identified   Warfarin dose taken as prescribed Yes   Signs/symptoms of bleeding History of bleeding? No   Vitamin K intake Consistency of servings of green, leafy vegetables per week ?  Yes   Recent vomiting/diarrhea/fever None reported   Changes in weight, activity, stress None reported   alcohol use Patient reports having 0 drinks per day   Upcoming surgeries or procedures None reported       Patient's INR is not at goal : did not take the 3 mg dose 5/10/21, because she was very ill after dialysis. Patient was also reminded to maintain consistent vitamin K intake and call with any bleeding, medication changes, or fever/vomiting/diarrhea. Next INR check: 5/1321    Addendum:  5/10/21    INR is above goal, gave dosage instructions    Patient/family instructed.       Jailene Jones, CNP           INR Summary                          Warfarin regimen (mg)  Date INR A/P Sun Mon Tue Wed Thu Fri Sat Mg/wk                                                                      4/5/21 2.0 At goal 2 3 2 2 2 2 2 15   3/29/21 2.6 At goal 2 3 2 2 2 2 2 15   3/15/21 2.4 At goal 2 3 2 2 2 2 2 15   3/1/21 1.9 Just below goal 2 3 2 2 2 2 2 15   2/22/21  3.2 Just above goal 2 2 2 2 2 2 2 14   2/8/21 2.8 At goal 2 2 2 2 2 2 2 14   2/1/21 2.3 At goal 5 5 5 7.5 5 7.5 5 40   1/25/21 4.0 Above goal 5 hold hold 7.5 5 7.5 5 30   1/18/21  Above goal  5 5 5 7.5 5 7.5 5 40   1/11/21 3.0 At goal 5 7.5 5 7.5 5 7.5 5 42.5   1/6/21 3.3 Above goal 5 7.5 5 7.5  5 7.5 5 42.5   12/28/20 3.1 Just above goal 5 7.5 7.5 7.5 5 7.5 5 45   12/21/20 1.8 Below goal 5 7.5 7.5 7.5 5 7.5 5 45   12/14/20 2.9 At goal 5 7.5 5 7.5 5 7.5 5 42.5   12/7/20 2.8 At goal 5 7.5 5 7.5 5 7.5 5 42.5   11/30/20 2.7 At goal 5 7.5 5 7.5 5 7.5 5 42.5   11/23/20 2.3 At goal 5 7.5 5 7.5 5 7.5 5 42.5   11/16/20 3.2 Slightly over goal  5 7.5 5 7.5 5 7.5 5 42.5   11/9/20 2.9 At goal  5 7.5 5 7.5 5 7.5 5 42.5   11/6/20 2.9 At goal 5 7.5 5 7.5 5 7.5 5 42.5   10/26/20 2.3 At goal 5 7.5 5 7.5 5 7.5 5 42.5   10/20/20 3.1 At goal 5 7.5 5 7.5 5 7.5 5 42.5   10/12/20 3.3 Slightly above goal 5 7.5 5 7.5 5 7.5 5 42.5   10/5/20 2.9 At goal 5 7.5 5 7.5 5 7.5 5 42.5   9/28/20 3.2 Slightly above Goal 5 7.5 5 7.5 5 7.5 5 42.5   9/21/20 3.1 At goal 5 7.5 5 7.5 5 7.5 5 32.5   9/9/20 1.8 Below goal 5 7.5 5 7.5 5 7.5 5 32.5   9/7/20 4.0 Over goal 7.5 hold hold 7.5 5 7.5 5 32.5   8/31/20 1.90 Near goal 7.5 7.5 7.5 7.5 7.5 7.5 7.5 52.5   8/24/20 1.30 Below goal 7.5 7.5 7.5 7.5 7.5 7.5 7.5 49   8/17/20 1.5 Below goal 5 10 7.5 5 5 5 5 42.5   8/10/20 1.9 At goal 5 7.5 7.5 5 5 5 5 40   7/27/20 2.1 At goal 5 7.5 7.5 5 5 5 5 40        Assessment/Plan:    Recent hospitalizations/HC visits None reported    Recent medication changes No   Medications taken regularly that may interact with warfarin or alter INR No significant drug interactions identified   Warfarin dose taken as prescribed Yes   Signs/symptoms of bleeding History of bleeding? No   Vitamin K intake Consistency of servings of green, leafy vegetables per week ? Yes   Recent vomiting/diarrhea/fever None reported   Changes in weight, activity, stress None reported   alcohol use Patient reports having 0 drinks per day   Upcoming surgeries or procedures None reported       Patient's INR is at goal-same dosing schedule  Patient was also reminded to maintain consistent vitamin K intake and call with any bleeding, medication changes, or fever/vomiting/diarrhea. Next INR check: 4/12/21    Addendum:  5/11/21    INR is within goal, continue current dose  Repeat INR in one week/s. Patient/family instructed. Darren Hu CNP        See anti-coag encounter.     ANTICOAGULATION MONITORING    Shlomo Chavarria, 1940    Anticoagulation Indication(s):  Afib    Referring Physician:   Dr. Holly Casillas  Goal INR Range:  2-3    Duration of Anticoagulation Therapy: indefinite  Home or Lab Draw: [facility]  Product patient has at home: warfarin 2 mg    Lab Results   Component Value Date    INR 3.70 05/10/2021    INR 1.50 05/03/2021    INR 2.40 04/26/2021    PROTIME 12.3 12/10/2018    PROTIME 11.5 11/30/2016    PROTIME 10.1 12/18/2014        Component Value Date    INR 2.10 hospitalizations/HC visits None reported    Recent medication changes No   Medications taken regularly that may interact with warfarin or alter INR No significant drug interactions identified   Warfarin dose taken as prescribed Yes   Signs/symptoms of bleeding History of bleeding? No   Vitamin K intake Consistency of servings of green, leafy vegetables per week ? Yes   Recent vomiting/diarrhea/fever None reported   Changes in weight, activity, stress None reported   alcohol use Patient reports having 0 drinks per day   Upcoming surgeries or procedures None reported       Patient's INR is at goal-same dosing schedule  Patient was also reminded to maintain consistent vitamin K intake and call with any bleeding, medication changes, or fever/vomiting/diarrhea. Next INR check: 5/13/21    Addendum:  5/11/21    INR is within goal, continue current dose  Repeat INR in one week/s. Patient/family instructed.       Mony Rockwell, CNP

## 2021-05-13 NOTE — TELEPHONE ENCOUNTER
I spoke w/ Ms Marielena Gerard. She states after dialysis her b/p is very low, 22'E-32'R systolic. The dialysis nurses will not let her go until sb/p >100. She states when her b/p is this low she feels terrible w/ nausea and headaches. Afterwards she feels bad most of the day. She does not take Metoprolol prior to dialysis, but does take Midodrine as instructed. Her dry weight is 98.5 and most days they get her to that weight.

## 2021-05-13 NOTE — TELEPHONE ENCOUNTER
She went to get her hair cut today and fainted while out. Her granddaughter called 44 and the squad came . She didn't go to the hospital because once they got her up she felt ok . The paramedics agreed that she was having too much fliud remmoved at dialysis so she will talk to them tomorrow .

## 2021-05-17 NOTE — TELEPHONE ENCOUNTER
Bhanu faxed over an INR today of 3.8. I Called patient left message on machine to call us back. I did leave instructions that she should hold tonight's dose. We need to see what she has been taking and how she is feeling. Last INR was 3.7 and she was to recheck on 5/13. Bhanu states they check her every Monday. I advised them she should be checked on Friday. Pt will still need dosing for the rest of the week once we get a call back.

## 2021-05-18 NOTE — PROGRESS NOTES
See anti-coag encounter. ANTICOAGULATION MONITORING    Page Patel, 1940    Anticoagulation Indication(s):  Afib    Referring Physician:   Dr. Irma Loya  Goal INR Range:  2-3    Duration of Anticoagulation Therapy: indefinite  Home or Lab Draw: Perla Laws  Product patient has at home: warfarin 2 mg    Lab Results   Component Value Date    INR 3.80 05/17/2021    INR 3.70 05/10/2021    INR 1.50 05/03/2021    PROTIME 12.3 12/10/2018    PROTIME 11.5 11/30/2016    PROTIME 10.1 12/18/2014        Component Value Date    INR 2.10 07/27/2020    INR 2.20 07/20/2020    INR 1.40 07/13/2020       INR Summary                          Warfarin regimen (mg)  Date INR A/P Sun Mon Tue Wed Thu Fri Sat Mg/wk                                                                                                                                                    5/17/21 3.8 Above goal 2 HOLD 2 2 2 2 2 12   5/11/21 3.7 Above goal 2 pt held 2 2 2 2 2 12   5/3/21 1.5 Below goal 2 3 3 2 2 2 2 16   4/12/21 2.5 At goal 2 3 2 2 2 2 2 15   4/5/21 2.0 At goal 2 3 2 2 2 2 2 15   3/29/21 2.6 At goal 2 3 2 2 2 2 2 15   3/15/21 2.4 At goal 2 3 2 2 2 2 2 15   3/1/21 1.9 Just below goal 2 3 2 2 2 2 2 15   2/22/21  3.2 Just above goal 2 2 2 2 2 2 2 14   2/8/21 2.8 At goal 2 2 2 2 2 2 2 14   2/1/21 2.3 At goal 5 5 5 7.5 5 7.5 5 40   1/25/21 4.0 Above goal 5 hold hold 7.5 5 7.5 5 30   1/18/21  Above goal  5 5 5 7.5 5 7.5 5 40   1/11/21 3.0 At goal 5 7.5 5 7.5 5 7.5 5 42.5   1/6/21 3.3 Above goal 5 7.5 5 7.5  5 7.5 5 42.5   12/28/20 3.1 Just above goal 5 7.5 7.5 7.5 5 7.5 5 45     Assessment/Plan:    Recent hospitalizations/HC visits None reported    Recent medication changes No   Medications taken regularly that may interact with warfarin or alter INR No significant drug interactions identified   Warfarin dose taken as prescribed Yes   Signs/symptoms of bleeding History of bleeding?  No   Vitamin K intake Consistency of servings of green, leafy vegetables per week ? Yes   Recent vomiting/diarrhea/fever None reported   Changes in weight, activity, stress None reported   alcohol use Patient reports having 0 drinks per day   Upcoming surgeries or procedures None reported       Patient's INR is above goal-hold the dose 5/17, then resume dose. Recheck 5/24/21  Patient was also reminded to maintain consistent vitamin K intake and call with any bleeding, medication changes, or fever/vomiting/diarrhea. Next INR check: 5/24/21    Addendum:  5/18/21    Addendum:  5/18/21  Reviewed RN assessment and plan. INR is above goal, decrease current dose by 2mg  Repeat INR in 1 week/s. Patient/family instructed by RN.      Kush Marx CNP             INR Summary                          Warfarin regimen (mg)  Date INR A/P Sun Mon Tue Wed Thu Fri Sat Mg/wk                                                                      4/5/21 2.0 At goal 2 3 2 2 2 2 2 15   3/29/21 2.6 At goal 2 3 2 2 2 2 2 15   3/15/21 2.4 At goal 2 3 2 2 2 2 2 15   3/1/21 1.9 Just below goal 2 3 2 2 2 2 2 15   2/22/21  3.2 Just above goal 2 2 2 2 2 2 2 14   2/8/21 2.8 At goal 2 2 2 2 2 2 2 14   2/1/21 2.3 At goal 5 5 5 7.5 5 7.5 5 40   1/25/21 4.0 Above goal 5 hold hold 7.5 5 7.5 5 30   1/18/21  Above goal  5 5 5 7.5 5 7.5 5 40   1/11/21 3.0 At goal 5 7.5 5 7.5 5 7.5 5 42.5   1/6/21 3.3 Above goal 5 7.5 5 7.5  5 7.5 5 42.5   12/28/20 3.1 Just above goal 5 7.5 7.5 7.5 5 7.5 5 45   12/21/20 1.8 Below goal 5 7.5 7.5 7.5 5 7.5 5 45   12/14/20 2.9 At goal 5 7.5 5 7.5 5 7.5 5 42.5   12/7/20 2.8 At goal 5 7.5 5 7.5 5 7.5 5 42.5   11/30/20 2.7 At goal 5 7.5 5 7.5 5 7.5 5 42.5   11/23/20 2.3 At goal 5 7.5 5 7.5 5 7.5 5 42.5   11/16/20 3.2 Slightly over goal  5 7.5 5 7.5 5 7.5 5 42.5   11/9/20 2.9 At goal  5 7.5 5 7.5 5 7.5 5 42.5   11/6/20 2.9 At goal 5 7.5 5 7.5 5 7.5 5 42.5   10/26/20 2.3 At goal 5 7.5 5 7.5 5 7.5 5 42.5   10/20/20 3.1 At goal 5 7.5 5 7.5 5 7.5 5 42.5   10/12/20 3.3 Slightly above goal 5 7.5 5 7.5 5 7/27/20 2.1 At goal 5 7.5 7.5 5 5 5 5 40        Assessment/Plan:    Recent hospitalizations/HC visits None reported    Recent medication changes No   Medications taken regularly that may interact with warfarin or alter INR No significant drug interactions identified   Warfarin dose taken as prescribed Yes   Signs/symptoms of bleeding History of bleeding? No   Vitamin K intake Consistency of servings of green, leafy vegetables per week ? Yes   Recent vomiting/diarrhea/fever None reported   Changes in weight, activity, stress None reported   alcohol use Patient reports having 0 drinks per day   Upcoming surgeries or procedures None reported       Patient's INR is above goal-hold the [place]cilla 5/17, then resume dosing schedule  Patient was also reminded to maintain consistent vitamin K intake and call with any bleeding, medication changes, or fever/vomiting/diarrhea. Next INR check: 5/24/21    Addendum:  5/18/21    INR is within goal, continue current dose  Repeat INR in one week/s. Patient/family instructed.      CNP

## 2021-05-21 NOTE — PROGRESS NOTES
goal 5 7.5 7.5 5 5 5 5 40   7/27/20 2.1 At goal 5 7.5 7.5 5 5 5 5 40        Assessment/Plan:    Recent hospitalizations/HC visits None reported    Recent medication changes No   Medications taken regularly that may interact with warfarin or alter INR No significant drug interactions identified   Warfarin dose taken as prescribed Yes   Signs/symptoms of bleeding History of bleeding? No   Vitamin K intake Consistency of servings of green, leafy vegetables per week ? Yes   Recent vomiting/diarrhea/fever None reported   Changes in weight, activity, stress None reported   alcohol use Patient reports having 0 drinks per day   Upcoming surgeries or procedures None reported       Patient's INR is at goal-2 mg daily, except Monday, take 1 mg. Recheck in one week. Patient was also reminded to maintain consistent vitamin K intake and call with any bleeding, medication changes, or fever/vomiting/diarrhea. Next INR check: 5/28/21    Addendum:  5/21/21    INR is within goal  Repeat INR in one week/s. Patient/family instructed.        Laureen Kay MD

## 2021-05-25 PROBLEM — I21.4 NSTEMI (NON-ST ELEVATED MYOCARDIAL INFARCTION) (HCC): Status: ACTIVE | Noted: 2021-01-01

## 2021-05-25 NOTE — TELEPHONE ENCOUNTER
Patient asking to speak to LES nurse asap about severe SOB. She saw pcp today and was told she has an aorta problem . pcp office note being faxed to this office .

## 2021-05-25 NOTE — ED PROVIDER NOTES
[facility]        Pt Name: Perry Bermeo  MRN: [id number]  [place] 1940  Date of evaluation: 5/25/2021  Provider: XU Bergman  PCP: Marium Roca MD  Note Started: 5:16 PM EDT        I have seen and evaluated this patient with my supervising physician Cynthia Garland MD.    [address] [facility]       Chief Complaint   Patient presents with    Fatigue     Pt sent by PCP for fatigue, dizziness, and SOB. Reports falling 3 times in past week. Hemodialysis MWF    Shortness of Breath       HISTORY OF PRESENT ILLNESS   (Location, Timing/Onset, Context/Setting, Quality, Duration, Modifying Factors, Severity, Associated Signs and Symptoms)  Note limiting factors. Perry Bermeo is a 78 y.o. female with past medical strip CAD, CHF, chronic kidney disease, diabetes, end-stage renal disease on hemodialysis every Monday Wednesday Friday, hyperlipidemia, hypertension who presents to the ED with complaint of shortness of breath. Patient did receive full dialysis treatment yesterday. Patient states she has had fatigue, lightheadedness, weakness and multiple falls/syncopal episodes over the past several months. Patient states she saw her PCP earlier today who told her she had a \"leaky aortic valve\". Patient states she was told they could not do much for it. Patient states she called her cardiologist, Dr. Kartik Sheikh, who recommended she come to the emergency department for further evaluation treatment. Patient states she has had fatigue, lightheadedness, decreased activity and multiple falls/syncopal episodes over the past couple months. States she has shortness of breath with associated leg swelling and difficulty with ambulation secondary to significant shortness of breath. Patient denies any chest pain or chest tightness. Denies abdominal pain/distention.   Denies any nausea, vomiting, decrease in urine output or changes in bowel movements. Denies fever chills. Denies cough or hemoptysis. Patient became concerned and came to the ED at the request of cardiology for further evaluation and treatment. Is anticoagulated on Coumadin. Patient denies any headache, visual changes, speech disturbances, numbness/tingling or injuries from the falls. Denies any neck pain. Nursing Notes were all reviewed and agreed with or any disagreements were addressed in the HPI. REVIEW OF SYSTEMS    (2-9 systems for level 4, 10 or more for level 5)     Review of Systems   Constitutional: Positive for activity change and fatigue. Negative for appetite change, chills, diaphoresis and fever. Eyes: Negative for photophobia and visual disturbance. Respiratory: Positive for shortness of breath. Negative for cough and chest tightness. Cardiovascular: Positive for leg swelling. Negative for chest pain and palpitations. Gastrointestinal: Negative for abdominal pain, constipation, diarrhea, nausea and vomiting. Genitourinary: Negative for decreased urine volume, difficulty urinating, dysuria, flank pain, frequency, hematuria and urgency. Musculoskeletal: Negative for arthralgias, back pain, myalgias, neck pain and neck stiffness. Skin: Negative for color change, pallor, rash and wound. Neurological: Positive for syncope, weakness and light-headedness. Negative for dizziness, tremors, seizures, facial asymmetry, speech difficulty, numbness and headaches. Positives and Pertinent negatives as per HPI. Except as noted above in the ROS, all other systems were reviewed and negative. PAST MEDICAL HISTORY     Past Medical History:   Diagnosis Date    Arthritis     Blood circulation, collateral     CAD (coronary artery disease)     CHF (congestive heart failure) (HCC)     Chronic kidney disease     Diabetes mellitus ([facility]     Hemodialysis patient ([facility]     Mon. Wed. Fri.     Hypercholesteremia     Hypertension     Other disorders of kidney and ureter     Pneumonia     pneumonia    Thyroid disease          SURGICAL HISTORY     Past Surgical History:   Procedure Laterality Date    ABDOMEN SURGERY      CARDIAC SURGERY      CORONARY ARTERY BYPASS GRAFT      4 grafts, approx 5 yrs ago    CYST REMOVAL      DIALYSIS FISTULA CREATION  12/18/14    EYE SURGERY      cataracts removed/leaks    OTHER SURGICAL HISTORY Left 12/18/14    LEFT RADIOCEPHALIC FISTULA    REFRACTIVE SURGERY      TONSILLECTOMY      VASCULAR SURGERY           CURRENTMEDICATIONS       Previous Medications    ACCU-CHEK COMPACT PLUS STRIP        CHOLECALCIFEROL (VITAMIN D PO)    Take 50,000 Units by mouth every 14 days     CINACALCET (SENSIPAR) 30 MG TABLET    Take 30 mg by mouth every other day     COMFORT ASSIST INSULIN SYRINGE 30G X 5/16\" 0.5 ML MISC        DOCUSATE SODIUM (COLACE) 100 MG CAPSULE    Take 100 mg by mouth daily. FISH OIL-OMEGA-3 FATTY ACIDS 1000 MG CAPSULE    Take 1 g by mouth 3 times daily. INSULIN LISPRO (HUMALOG) 100 UNIT/ML INJECTION VIAL    Inject 15 Units into the skin every morning (before breakfast) sliding scale    LANTUS 100 UNIT/ML INJECTION VIAL    Inject 15 Units into the skin See Admin Instructions Holds for a BS < 100, does a sliding scale with Lantus    LEVOTHYROXINE (SYNTHROID) 25 MCG TABLET    Take 25 mcg by mouth Daily. METOPROLOL TARTRATE (LOPRESSOR) 25 MG TABLET    Take 25 mg by mouth 2 times daily    MIDODRINE (PROAMATINE) 10 MG TABLET    Take 10-20 mg by mouth 3 times daily Only take if instructed by dialysis     MISC. DEVICES (ROLLATOR) MISC    1 Device by Does not apply route daily    PANTOPRAZOLE (PROTONIX) 40 MG TABLET    Take 40 mg by mouth daily    RENVELA 800 MG TABLET    Take 1 tablet by mouth 3 times daily     ROSUVASTATIN (CRESTOR) 20 MG TABLET    Take 20 mg by mouth daily.     WARFARIN (COUMADIN) 2 MG TABLET    TAKE ONE TABLET BY MOUTH DAILY OR AS DIRECTED BASED ON INR RESULTS    WARFARIN (COUMADIN) 5 MG TABLET Take 1 tablet by mouth daily         ALLERGIES     Lidocaine, Lisinopril, Betadine [povidone iodine], and Levaquin [levofloxacin in d5w]    FAMILYHISTORY       Family History   Problem Relation Age of Onset    Heart Disease Father     High Blood Pressure Father     High Cholesterol Father     Heart Attack Father     Diabetes Maternal Uncle     Diabetes Maternal Aunt           SOCIAL HISTORY       Social History     Tobacco Use    Smoking status: Never Smoker    Smokeless tobacco: Never Used   Vaping Use    Vaping Use: Never used   Substance Use Topics    Alcohol use: No    Drug use: No       SCREENINGS             PHYSICAL EXAM    (up to 7 for level 4, 8 or more for level 5)     ED Triage Vitals [05/25/21 1618]   BP Temp Temp Source Pulse Resp SpO2 Height Weight   101/63 97.5 °F (36.4 °C) Oral 76 20 100 % -- 215 lb (97.5 kg)       Physical Exam  Constitutional:       General: She is not in acute distress. Appearance: Normal appearance. She is well-developed. She is not ill-appearing, toxic-appearing or diaphoretic. HENT:      Head: Normocephalic and atraumatic. Right Ear: External ear normal.      Left Ear: External ear normal.   Eyes:      General:         Right eye: No discharge. Left eye: No discharge. Extraocular Movements: Extraocular movements intact. Conjunctiva/sclera: Conjunctivae normal.      Pupils: Pupils are equal, round, and reactive to light. Cardiovascular:      Rate and Rhythm: Normal rate and regular rhythm. Pulses: Normal pulses. Heart sounds: Normal heart sounds. No murmur heard. No friction rub. No gallop. Comments: 2+ radial pulses bilaterally. 2+ pitting edema to the bilateral lower extremities. No calf tenderness. No JVD. Pulmonary:      Effort: Pulmonary effort is normal. No respiratory distress. Breath sounds: Normal breath sounds. No stridor. No wheezing, rhonchi or rales.       Comments: Does have some conversational (*)     Chloride 90 (*)     Glucose 224 (*)     CREATININE 4.7 (*)     GFR Non- 9 (*)     GFR African American 11 (*)     Alkaline Phosphatase 146 (*)     All other components within normal limits    Narrative:     Gwenyth Dance [facility] tel. [phone],  Chemistry results called to and read back by Jonel Baron, 05/25/2021  18:07, by [facility]  Performed at:  [facility] [address], [facility], [address]   Phone [phone]   TROPONIN - Abnormal; Notable for the following components:    Troponin 1.71 (*)     All other components within normal limits    Narrative:     Gwenyth Dance [facility] tel. [phone],  Chemistry results called to and read back by Jonel Baron, 05/25/2021  18:07, by [facility]  Performed at:  [facility] [address], [facility], [facility][note 2] [address]   Phone [phone]   [address] - Abnormal; Notable for the following components:    Pro-BNP >70,000 (*)     All other components within normal limits    Narrative:     Performed at:  [facility] [address], [facility], [address]   Phone [phone]   PROTIME-INR - Abnormal; Notable for the following components:    Protime 48.4 (*)     INR 4.11 (*)     All other components within normal limits    Narrative:     Performed at:  [facility] [address], [facility], [facility][note 2] [address]   Phone [phone]   URINE RT REFLEX TO CULTURE       All other labs were within normal range or not returned as of this dictation. EKG: All EKG's are interpreted by the Emergency Department Physician in the absence of a cardiologist.  Please see their note for interpretation of EKG.     RADIOLOGY:   Non-plain film images such as CT, Ultrasound and MRI are read by the radiologist. Plain radiographic images are visualized and preliminarily interpreted by the ED Provider with the below findings:        Interpretation per the Radiologist below, if available at the time of this note:    XR CHEST PORTABLE   Final Result   Cardiomegaly with no evidence of pulmonary edema         CT HEAD WO CONTRAST   Final Result   No acute intracranial abnormality. Diffuse atrophic changes with findings suggesting chronic microvascular   ischemia           No results found. PROCEDURES   Unless otherwise noted below, none     Procedures    CRITICAL CARE TIME   The total critical care time spent while evaluating and treating this patient was 42 minutes. This excludes time spent doing separately billable procedures. This includes time at the bedside, data interpretation, medication management, obtaining critical history from collateral sources if the patient is unable to provide it directly, and physician consultation. Specifics of interventions taken and potentially life-threatening diagnostic considerations are listed above in the medical decision making. CONSULTS:  IP CONSULT TO CARDIOLOGY      EMERGENCY DEPARTMENT COURSE and DIFFERENTIAL DIAGNOSIS/MDM:   Vitals:    Vitals:    05/25/21 1618 05/25/21 1908   BP: 101/63 (!) 97/46   Pulse: 76 76   Resp: 20 23   Temp: 97.5 °F (36.4 °C)    TempSrc: Oral    SpO2: 100%    Weight: 215 lb (97.5 kg)        Patient was given the following medications:  Medications   aspirin chewable tablet 324 mg (has no administration in time range)   phytonadione (ADULT) (VITAMIN K) 5 mg in dextrose 5 % 100 mL IVPB (has no administration in time range)           Patient is a 78-year-old female who presents to the ED with complaint of weakness and fatigue. Complaint of generalized weakness, fatigue and dyspnea upon exertion with couple syncopal episodes. Apparently has been ongoing for the past couple months ever since she received her Covid vaccine back in March.   Patient states she has had generalized weakness fatigue for the past couple months with intermittent syncopal episodes. Patient states over the past week she has had dyspnea on exertion that appears slightly worse. Patient denies any chest pain or chest tightness. Patient states she saw PCP earlier today who told her she could have a leaky aortic valve. Did review outpatient records and has not had recent echocardiogram.  Patient does have a history of CABG x4 back in 2011 with recent stress test back in 2014. She called her cardiologist who recommended she come to the ED for further evaluation and treatment. Patient complain of dyspnea on exertion with leg swelling and what appears to be crackles to the bibasilar lungs concern for potential fluid overload and CHF. IV established and blood work obtained. EKG abnormal and repeat EKG showed what appears to be T wave inversions and some slight depressions in the inferior lateral leads. For further EKG interpretation please see attending provider note. CBC showed normal white count and hemoglobin. Normal platelets. CMP showed creatinine of 4.7 consistent with history of end-stage renal disease on hemodialysis. Troponin I 0.71. Patient has had nonzero troponin in the past but has never been significantly elevated to this level. Concern for NSTEMI especially given abnormal EKG. BNP greater than 70,000. INR supratherapeutic at 4.11. Chest x-ray showed cardiomegaly without pulmonary edema. CT of the head obtained given multiple falls and syncopal episodes with supratherapeutic INR unremarkable. Given history and physical examination patient suffering from elevated troponin with weakness, fatigue and dyspnea on exertion. Case discussed with cardiologist given abnormal EKG, Dr. Mata Malcolm, who recommended giving 10mg vitamin K for supratherapeutic INR and admission to hospital service for echocardiogram in the morning and evaluation by the service potential catheterization. No emergent catheterization per cardiology recommendations at this time.   Patient given aspirin here in the ED. Case will be discussed with hospital service for admission. FINAL IMPRESSION      1. NSTEMI (non-ST elevated myocardial infarction) ([address]    2. Other fatigue    3. CRUZ (dyspnea on exertion)    4. Syncope and collapse    5. Supratherapeutic INR          DISPOSITION/PLAN   DISPOSITION Decision To Admit 05/25/2021 07:02:40 PM      PATIENT REFERRED TO:  No follow-up provider specified.     DISCHARGE MEDICATIONS:  New Prescriptions    No medications on file       DISCONTINUED MEDICATIONS:  Discontinued Medications    No medications on file              (Please note that portions of this note were completed with a voice recognition program.  Efforts were made to edit the dictations but occasionally words are mis-transcribed.)    XU Benitez (electronically signed)          XU Rivas  05/25/21 5331

## 2021-05-25 NOTE — ED PROVIDER NOTES
As physician-in-triage, I performed a medical screening history and physical exam on Debbie Miranda. I also cared for and evaluated the patient in conjunction with the ED Advanced Practice Provider. All diagnostic, treatment, and disposition decisions were made by myself in conjunction with the advanced practice provider. For all further details of the patient's emergency department visit, please see the advanced practice provider's documentation. Patient presents to the ER for evaluation of epigastric and positive chest discomfort fullness, end-stage renal disease on hemodialysis Monday Wednesday Friday, from [facility]. Dr. Chantell Anne sooner as her cardiologist, she presents ER for evaluation of recurrent syncope discomfort of her chest, fullness, she has marked ST segment depression inferior lateral with a troponin of 1.71. No recent stress test or angiogram.  Normal mental status. Her blood pressure is too low for nitroglycerin this point time she will have reversal of her INR which is elevated above 4 which warfarin is used for her atrial fibrillation. Her pain is a 2 out of 10 she will be admitted for serial troponin evaluation possible echocardiogram possible cardiac catheterization. She verbalized understanding of her disposition and management. Impression:  Non-STEMI end-stage renal disease on hemodialysis. Diabetes. Precordial chest pain. Elevated troponin.   Abnormal EKG      Jovani Casanova MD  43/81/12 [address] Gordon MD  76/69/83 9252

## 2021-05-25 NOTE — ED NOTES
Pt. Is awake, alert and oriented x4. Skin pale and dry. Bruise noted to left knee s/p a fall a few days ago. Bilateral ankle edema noted. resp easy and regular. No cough or SOB noted. Denies any pain at present. Pt. Updated on plan of care. Call light in reach.       Joby Longo RN  05/25/21 1935

## 2021-05-25 NOTE — ED NOTES
Bed: 09  Expected date:   Expected time:   Means of arrival:   Comments:  Ruba Painting RN  05/25/21 9200

## 2021-05-26 PROBLEM — R79.1 SUPRATHERAPEUTIC INR: Status: ACTIVE | Noted: 2021-01-01

## 2021-05-26 NOTE — CONSULTS
daily      midodrine (PROAMATINE) 10 MG tablet Take 10-20 mg by mouth 3 times daily Only take if instructed by dialysis       Misc. Devices (ROLLATOR) MISC 1 Device by Does not apply route daily 1 each 0    insulin lispro (HUMALOG) 100 UNIT/ML injection vial Inject 15 Units into the skin every morning (before breakfast) sliding scale      cinacalcet (SENSIPAR) 30 MG tablet Take 30 mg by mouth every other day       ACCU-CHEK COMPACT PLUS strip       LANTUS 100 UNIT/ML injection vial Inject 15 Units into the skin See Admin Instructions Holds for a BS < 100, does a sliding scale with Lantus      COMFORT ASSIST INSULIN SYRINGE 30G X 5/16\" 0.5 ML MISC       RENVELA 800 MG tablet Take 1 tablet by mouth 3 times daily       Cholecalciferol (VITAMIN D PO) Take 50,000 Units by mouth every 14 days       levothyroxine (SYNTHROID) 25 MCG tablet Take 25 mcg by mouth Daily.  rosuvastatin (CRESTOR) 20 MG tablet Take 20 mg by mouth daily.  fish oil-omega-3 fatty acids 1000 MG capsule Take 1 g by mouth 3 times daily.  docusate sodium (COLACE) 100 MG capsule Take 100 mg by mouth daily.       warfarin (COUMADIN) 5 MG tablet Take 1 tablet by mouth daily 60 tablet 3    metoprolol tartrate (LOPRESSOR) 25 MG tablet Take 25 mg by mouth 2 times daily         Allergies:  Metoprolol, Lidocaine, Lisinopril, Betadine [povidone iodine], and Levaquin [levofloxacin in d5w]    Social History:    Social History     Socioeconomic History    Marital status: Single     Spouse name: Not on file    Number of children: Not on file    Years of education: 15    Highest education level: Not on file   Occupational History    Not on file   Tobacco Use    Smoking status: Never Smoker    Smokeless tobacco: Never Used   Vaping Use    Vaping Use: Never used   Substance and Sexual Activity    Alcohol use: No    Drug use: No    Sexual activity: Not on file   Other Topics Concern    Not on file   Social History Narrative    Not Positive for dizziness. Negative for tremors, seizures and headaches. Psychiatric/Behavioral: Negative for confusion and hallucinations. PHYSICAL EXAM:      Vitals:    BP (!) 96/58   Pulse 73   Temp 98.2 °F (36.8 °C) (Oral)   Resp 16   Ht 5' 2\" (1.575 m)   Wt 221 lb 3.2 oz (100.3 kg)   SpO2 96%   BMI 40.46 kg/m²   I/O last 3 completed shifts: In: 65.7 [I.V.:65.7]  Out: -   No intake/output data recorded. Physical Exam:  General : AAOx3, not in pain or respiratory distress, resting in bed  HEENT : normocephalic, atraumatic, mucosa moist, no palor or icterus  CVS: S1 S2 normal, regular rhythm, no murmurs or rubs. Lungs: Clear, no wheezing or crackles. Abd: Soft, bowel sounds normal, non-tender. Ext: LE edema +, no cyanosis  Skin: Warm. No rashes appreciated. : bladder non-distended, no tenderness over the bladder  Neuro: Alert and oriented x 3, nonfocal.  Joints: No erythema noted over joints.   Access : left arm AVF+, thrill/bruit+    DATA:    CBC with Differential:    Lab Results   Component Value Date    WBC 8.8 05/26/2021    RBC 3.36 05/26/2021    HGB 11.4 05/26/2021    HCT 34.7 05/26/2021     05/26/2021    .5 05/26/2021    MCH 34.1 05/26/2021    MCHC 33.0 05/26/2021    RDW 15.0 05/26/2021    BANDSPCT 2 12/12/2018    LYMPHOPCT 17.6 05/25/2021    MONOPCT 10.2 05/25/2021    BASOPCT 0.6 05/25/2021    MONOSABS 0.8 05/25/2021    LYMPHSABS 1.4 05/25/2021    EOSABS 0.1 05/25/2021    BASOSABS 0.0 05/25/2021     BMP:    Lab Results   Component Value Date     05/26/2021    K 4.9 05/26/2021    CL 92 05/26/2021    CO2 24 05/26/2021    BUN 22 05/26/2021    LABALBU 3.7 05/25/2021    CREATININE 6.1 05/26/2021    CALCIUM 9.9 05/26/2021    GFRAA 8 05/26/2021    GFRAA 23 05/15/2013    LABGLOM 7 05/26/2021    GLUCOSE 215 05/26/2021     Ionized Calcium:  No results found for: IONCA  Magnesium:    Lab Results   Component Value Date    MG 1.90 12/17/2018     Phosphorus:    Lab Results Component Value Date    PHOS 3.5 12/17/2018     Troponin:    Lab Results   Component Value Date    TROPONINI 1.55 05/26/2021     Last 3 Troponin:    Lab Results   Component Value Date    TROPONINI 1.55 05/26/2021    TROPONINI 1.66 05/25/2021    TROPONINI 1.71 05/25/2021       ASSESSMENT/PLAN:      1. ESRD on HD MWF  HD today as per schedule  Will need midodrine with dialysis, fluid removal could be challenging  2. NSTEMI   Awaiting cardiology input  3. Shortness of breath  ?angina  CXR with no evidence of pulmonary edema  4. Hyponatremia mild  Expect improvement with dialysis  5. Anemia in CKD  Hb at goal.  No need of ESAs  6. CKD-MBD check phos  Continue sevelamer      Thank you for allowing me to participate in the care of this patient. I will continue to follow along. Please call with questions.     Nima La MD, MD.  Office Phone : [phone]  5/26/2021

## 2021-05-26 NOTE — CARE COORDINATION
Discharge Planning Assessment    SW discharge planner met with patient to discuss reason for admission, current living situation, and potential needs at the time of discharge. [address] for dialysis - M/W/F    Demographics/Insurance verified:  Yes    Current type of dwelling:  House - pt has stair lift    Living arrangements:  W/neice and niece's     Level of function/Support:  Pt reports she uses a 4 wheel walker at all times. Reported recent falls and unable to get up independently. Stated niece and her  are supportive but unable to assist pt physically. Had to call life squad each time she fell to help her up. PCP:  Dr. Harry Rehman    Last Visit to PCP:  Can't remember    DME:  4 wheel walker, stair lift, ramp. Pt reported she needs a wheelchair and tere lift. PT/OT pending. Active with any community resources/agencies/skilled home care:  None. Pt used to be active w/[facility] but not currently. Declined new referral stating she did not have any non-skilled needs. Niece drives to/from Dialysis appointments. Medication compliance issues:  No    Financial issues that could impact healthcare:  No    Tentative discharge plan:  Pt prefers home w/HHC. Has had [facility] in the past and ok w/them again. Also needs a wheelchair and tere lift. Would need orders for this if recommended by therapy. PT/OT pending. Discussed with patient and/or family that on the day of discharge home tentative time of discharge will be between 10 AM and noon. Transportation at the time of discharge:  Niece can assist home.     Electronically signed by MEREDITH Carlos LSW on 5/26/2021 at 6:33 PM

## 2021-05-26 NOTE — CONSULTS
[address] [facility]  [phone]        Reason for Consultation/Chief Complaint: \"Chest discomfort. \"    History of Pr shortness of breath and chest discomfort esent Illness:  Arian Pearson is a 78 y.o. patient who presented to the hospital with complaints of shortness of breath and chest discomfort. She was evaluated by PCP with worsening shortness of breath. Noted new and worsening leg swelling. She did note mid-sternal chest discomfort, pressure, 1/10, non-radiating. Past history of CAD s/p CABG. Currently no chest discomfort. Past Medical History:   has a past medical history of Arthritis, Blood circulation, collateral, CAD (coronary artery disease), CHF (congestive heart failure) ([facility] [address] Chronic kidney disease, Diabetes mellitus ([facility] [address] Hemodialysis patient ([facility] [address] Hypercholesteremia, Hypertension, Other disorders of kidney and ureter, Pneumonia, and Thyroid disease. Surgical History:   has a past surgical history that includes Coronary artery bypass graft; Cardiac surgery; cyst removal; Refractive surgery; Tonsillectomy; other surgical history (Left, 12/18/14); Dialysis fistula creation (12/18/14); Abdomen surgery; eye surgery; and vascular surgery. Social History:   reports that she has never smoked. She has never used smokeless tobacco. She reports that she does not drink alcohol and does not use drugs. Family History:  family history includes Diabetes in her maternal aunt and maternal uncle; Heart Attack in her father; Heart Disease in her father; High Blood Pressure in her father; High Cholesterol in her father. Home Medications:  Were reviewed and are listed in nursing record. and/or listed below  Prior to Admission medications    Medication Sig Start Date End Date Taking?  Authorizing Provider   warfarin (COUMADIN) 2 MG tablet TAKE ONE TABLET BY MOUTH DAILY OR AS DIRECTED BASED ON INR RESULTS 4/9/21  Yes TOBY Red CNP   pantoprazole (PROTONIX) 40 MG tablet updated today and is negative except as noted in the history of present illness. Physical Examination:    Vitals:    05/26/21 0830   BP:    Pulse: 73   Resp:    Temp:    SpO2:     Weight: 221 lb 3.2 oz (100.3 kg)         General Appearance:  Alert, cooperative, no distress, appears stated age   Head:  Normocephalic, without obvious abnormality, atraumatic   Eyes:  EOMI, conjunctiva/corneas clear       Nose: Nares normal   Throat: Lips normal   Neck: Supple, symmetrical, trachea midline,  no carotid bruit or JVD       Lungs:   Clear to auscultation bilaterally, respirations unlabored   Chest Wall:  No tenderness or deformity   Heart:  Regular rate and rhythm, S1, S2 normal, 2/6 systolic murmur at RUSB and LUSB, no rub or gallop   Abdomen:   Soft, non-tender, bowel sounds active all four quadrants,  no masses, no organomegaly           Extremities: Extremities normal, atraumatic, no cyanosis.  1+ edema   Pulses: 2+ and symmetric   Skin: Skin color, texture, turgor normal, no rashes or lesions   Pysch: Normal mood and affect   Neurologic: Normal gross motor and sensory exam.         Labs  CBC:   Lab Results   Component Value Date    WBC 8.8 05/26/2021    RBC 3.36 05/26/2021    HGB 11.4 05/26/2021    HCT 34.7 05/26/2021    .5 05/26/2021    RDW 15.0 05/26/2021     05/26/2021     CMP:    Lab Results   Component Value Date     05/26/2021    K 4.9 05/26/2021    CL 92 05/26/2021    CO2 24 05/26/2021    BUN 22 05/26/2021    CREATININE 6.1 05/26/2021    GFRAA 8 05/26/2021    GFRAA 23 05/15/2013    AGRATIO 1.4 05/25/2021    LABGLOM 7 05/26/2021    GLUCOSE 215 05/26/2021    PROT 6.4 05/25/2021    PROT 6.8 11/06/2012    CALCIUM 9.9 05/26/2021    BILITOT 0.5 05/25/2021    ALKPHOS 146 05/25/2021    AST 26 05/25/2021    ALT 33 05/25/2021     LIPIDS: No components found for: TOTAL CHOLESTEROL,  HDL,  LDL,  TRIGLYCERIDES  PT/INR:  No results found for: PTINR  Lab Results   Component Value Date    TROPONINI 1.55 ([facility]) 05/26/2021       EKG:  I have reviewed EKG with the following interpretation:  Impression:    25-MAY-2021 16:30:19 [facility] ROUTINE RECORD  Sinus rhythm with Premature supraventricular complexes  Anterior infarct , age undetermined  Marked ST abnormality, possible inferolateral subendocardial injury  Abnormal ECG    Imaging/Procedures:   Echo 6/16/2020:  Summary   -Overall left ventricular function is normal.   -Ejection fraction is visually estimated to be 55-60 %. E/e'= 20.05 cm.   -Grade II diastolic dysfunction with elevated LV filling pressures. -Mitral annular calcification is present.   -The mitral valve leaflets appear myxomatous. -Moderate mitral regurgitation.   -Dilated left atrium with a volume of 79.6 ml.   -There is moderate fibrocalcific sclerosis of the aortic valve with evidence of mild aortic stenosis with mean gradient across the aortic valve of 13 mmHg and calculated aortic valve area was 1.64 square centimeters, all suggesting mild aortic stenosis. -Moderate tricuspid regurgitation. Estimated pulmonary artery systolic pressure is mildly to moderately elevated at 65 mmHg assuming a right atrial   pressure of 3 mmHg. Assessment/Plan:  Principal Problem:    NSTEMI (non-ST elevated myocardial infarction) ([address]  Plan: Currently chest discomfort free. ECG abnormal with new changes. Repeat ECG. Medical management for now. Will discuss with Dr. Brenda Traore, primary cardiologist.  2D echo with Doppler. IV heparin for 48 hours. Active Problems:    Coronary atherosclerosis of native coronary artery  Plan: S/P CABG. Acute non-stemi. ESRD on hemodialysis ([address]  Plan: Chronic. Per Nephrology. Mixed hyperlipidemia  Plan: Statin. Essential hypertension  Plan: Stable. Chronic diastolic heart failure (HCC)  Plan: Some volume overload. Volume removal with dialysis. Atrial fibrillation ([facility] [address]  Plan: PAF. Anticoagulated with Warfarin. Currently sinus rhythm.   INR

## 2021-05-26 NOTE — PLAN OF CARE
Pt from home and lives with niece; pt receives dialysis M-W-F. Pt stated she has been having increased shortness of breath at rest & with exertion for the past three weeks. Pt also stated that she has cysts on the back of her head that \"pop and cause vertigo when they pop. \"  Currently, pt has two cysts on the posterior right side of her head. Pt experiences shortness of breath at rest; when assessed her oxygen saturation remains greater than 95%. Pt complains of neck & back pain rated at 10 on scale of 0-10 that she takes tylenol for at home. Pt does have redness & moisture under pannus; Interdry applied. Pt stated she is unable to supply urine same for urinalysis. Pt stated she is continually constipated; her last bowel movement was 5/25/21 described as small pebble. Pt is unsteady on her feet and has had frequent falls at home. She has a bruise on her left knee related to a recent fall. Pt does have difficulty walking and with stairs. At home, pt uses a walker & has a lift chair. Pt remains free from falls & injury; pt uses call light appropriately & bed alarm is on. Pt requested that all four side rails remain up, as she stated she falls out of bed. Heparin bolus and IV was started 0106 with scheduled lab draw aPTT at 0700; pt understands reason for Heparin and risk for bleeding. Pt benefits from increased frequency of rounds; will continue to assess.       Problem: Pain:  Goal: Pain level will decrease  Description: Pain level will decrease  Outcome: Ongoing  Goal: Control of acute pain  Description: Control of acute pain  Outcome: Ongoing  Goal: Control of chronic pain  Description: Control of chronic pain  Outcome: Ongoing     Problem: Skin Integrity:  Goal: Will show no infection signs and symptoms  Description: Will show no infection signs and symptoms  Outcome: Ongoing  Goal: Absence of new skin breakdown  Description: Absence of new skin breakdown  Outcome: Ongoing     Problem: Falls - Risk of:  Goal: Will remain free from falls  Description: Will remain free from falls  Outcome: Ongoing  Goal: Absence of physical injury  Description: Absence of physical injury  Outcome: Ongoing     Problem:  Activity:  Goal: Capacity to carry out activities will improve  Description: Capacity to carry out activities will improve  Outcome: Ongoing  Goal: Will verbalize the importance of balancing activity with adequate rest periods  Description: Will verbalize the importance of balancing activity with adequate rest periods  Outcome: Ongoing     Problem: Cardiac:  Goal: Hemodynamic stability will improve  Description: Hemodynamic stability will improve  Outcome: Ongoing  Goal: Ability to maintain an adequate cardiac output will improve  Description: Ability to maintain an adequate cardiac output will improve  Outcome: Ongoing     Problem: Coping:  Goal: Verbalizations of decreased anxiety will decrease  Description: Verbalizations of decreased anxiety will decrease  Outcome: Ongoing     Problem: Fluid Volume:  Goal: Risk for excess fluid volume will decrease  Description: Risk for excess fluid volume will decrease  Outcome: Ongoing  Goal: Maintenance of adequate hydration will improve  Description: Maintenance of adequate hydration will improve  Outcome: Ongoing  Goal: Will show no signs and symptoms of electrolyte imbalance  Description: Will show no signs and symptoms of electrolyte imbalance  Outcome: Ongoing     Problem: Health Behavior:  Goal: Ability to manage health-related needs will improve  Description: Ability to manage health-related needs will improve  Outcome: Ongoing  Goal: Ability to seek appropriate health care will improve  Description: Ability to seek appropriate health care will improve  Outcome: Ongoing     Problem: Nutritional:  Goal: Maintenance of adequate nutrition will improve  Description: Maintenance of adequate nutrition will improve  Outcome: Ongoing     Problem: Physical Regulation:  Goal: Complications related to the disease process, condition or treatment will be avoided or minimized  Description: Complications related to the disease process, condition or treatment will be avoided or minimized  Outcome: Ongoing     Problem: Respiratory:  Goal: Ability to maintain adequate ventilation will improve  Description: Ability to maintain adequate ventilation will improve  Outcome: Ongoing  Goal: Respiratory status will improve  Description: Respiratory status will improve  Outcome: Ongoing

## 2021-05-26 NOTE — ACP (ADVANCE CARE PLANNING)
Advanced Care Planning Note. Purpose of Encounter: Advanced care planning in light of NSTEMI  Parties In Attendance: Patient,   Decisional Capacity: Yes  Subjective: Patient presented with dizziness and shortness of breath   objective: Troponin elevated, diagnosed with NSTEMI   goals of Care Determination: Patient stated she wants to be a DNR. Discussed different types of DNR, [facility] versus DNR CCA. Patient would like to be DNR CCA without intubation. She wants to continue with current medical care and treatment, but if her heart were to stop or she were to stop breathing she does not want any further intervention.   Plan: Continue current medical treatment   code Status: DNR CCA no intubation  Time spent on Advanced care Plannin minutes  Advanced Care Planning Documents: None  Myriam Watson, APRN - CNP  2021 4:00 PM

## 2021-05-26 NOTE — H&P
[address]                     [address] Located within [facility], [address]                              HISTORY AND PHYSICAL    PATIENT NAME: Logan South                       :        1940  MED REC NO:   [id number]                          ROOM:       4275  ACCOUNT NO:   [de-identified]                           ADMIT DATE: 2021  PROVIDER:     David Garcia MD    DATE OF SERVICE:  I obtained the history and performed physical exam on  the patient in the emergency room on 2021. CHIEF COMPLAINT:  Shortness of breath. HISTORY OF PRESENT ILLNESS:  An 58-year-old  female presenting  to the hospital with chief complaints of a few weeks history of subacute  onset of gradually progressive intermittent worsening shortness of  breath which was significantly more when the patient tried to do any  activity, was partially relieved when the patient rested, not associated  with any nausea or vomiting. The patient did have significantly  increasing fatigue, dizziness with 2 or 3 falls in the past week. The  patient also is on dialysis Monday, Wednesday, Friday and notes that  after the dialysis she especially became really dizzy. PAST MEDICAL HISTORY:  1. End-stage renal disease on dialysis. 2.  Coronary artery disease status post CABG around 12-13 years ago. 3.  Type 2 diabetes mellitus. 4.  Hypertension. 5.  Dyslipidemia. PAST SURGICAL HISTORY:  1. Coronary artery bypass grafting. 2.  Abdominal surgeries. 3.  Left forearm AV fistula. ALLERGIC HISTORY:  The patient is allergic to METOPROLOL, LIDOCAINE,  LISINOPRIL, LEVAQUIN, BETADINE. FAMILY HISTORY:  Reviewed by me and is currently noncontributory. SOCIAL HISTORY:  Nonsmoker. No illicit substance use. MEDICATIONS:  Home medication list was documented in the EMR and the  list was reviewed by me.     REVIEW OF SYSTEMS:  Significant for the patient's shortness of breath  and per the history of present illness. All other systems reviewed and  are negative except for the history of present illness. PHYSICAL EXAMINATION:  The patient was examined by me in the emergency  room. VITAL SIGNS:  Temperature 97.5, respiratory rate 20, pulse 76, blood  pressure 101/63, saturating 100%. CNS:  Alert, awake and oriented. PSYCH:  Cooperative, answering questions appropriately. EYES:  Pupils are reactive to light. ENT:  Extraocular muscle movements are intact. RESPIRATORY SYSTEM:  No obvious rales, rubs or rhonchi. CVS:  S1, S2 are heard. No murmurs or rubs. Currently regular rate and  rhythm. ABDOMEN:  Nondistended. MUSCULOSKELETAL:  No acute deformities. SKIN:  The patient has got left upper extremity forearm AV fistula with  a good thrill. The patient has got bilateral lower extremity minimal 1+  pitting edema. DIAGNOSTIC DATA:  EKG independently reviewed by me has very very poor  data quality. CBC shows white count of 8.0. INR 4.11. BNP greater  than 70,000. Troponin 1.71. BUN 16, creatinine 4.7, sodium 131,  potassium 4.1. Chest x-ray shows cardiomegaly without pulmonary edema. CT of the head does not show any evidence of acute intracranial anomaly. CONSULTATIONS REQUESTED:  1. Cardiology. 2.  Nephrology. REVIEW OF PREVIOUS MEDICAL RECORDS:  Shows a 2D echo from 06/16/2020  that showed grade II diastolic dysfunction, LVEF of 55-60% with mild  aortic stenosis. ASSESSMENT:  1.  Non-ST elevation myocardial infarction in a patient with underlying  native vessel coronary artery disease. 2.  End-stage renal disease on dialysis. 3.  Hypertension. 4.  Dyslipidemia. 5.  Type 2 diabetes. PLAN OF CARE:  The patient is admitted to Internal Medicine Service. N.p.o. after midnight. Aspirin will be continued. Statins will be  continued. Heparin has been initiated and heparin will be started once  the patient's INR is less than 2.0.   Cardiology and Nephrology consults requested. CODE STATUS:  Full. EXPECTED LENGTH OF STAY:  More than two midnights based on plan of care  above. RISK:  High due to the patient's presentation with non-STEMI. DISPOSITION:  Admitted to Internal Medicine Service.         Ernesto Huang MD    D: 05/26/2021 0:05:40       T: 05/26/2021 0:11:58     [id number]  Job#: [id number]     Doc#: [id number]    CC:

## 2021-05-26 NOTE — PROGRESS NOTES
High    Coronary atherosclerosis of native coronary artery [I25.10]      Priority: High    Mixed hyperlipidemia [E78.2]      Priority: High    Essential hypertension [I10]      Priority: Medium    NSTEMI (non-ST elevated myocardial infarction) ([facility] [place] [facility] [I21.4]     Atrial fibrillation (HCC) [I48.91]     ESRD on hemodialysis ([facility] [place] [facility] [N18.6, Z99.2]     Chronic diastolic heart failure (HCC) [I50.32]     Chronic anemia [D64.9]        Medications:  Reviewed    Infusion Medications    sodium chloride       Scheduled Medications    pantoprazole  40 mg Oral Daily    sevelamer  800 mg Oral TID    rosuvastatin  40 mg Oral Daily    sodium chloride flush  5-40 mL Intravenous 2 times per day    aspirin  81 mg Oral Daily     PRN Meds: acetaminophen, sodium chloride flush, sodium chloride, ondansetron      Intake/Output Summary (Last 24 hours) at 5/26/2021 1049  Last data filed at 5/26/2021 0640  Gross per 24 hour   Intake 65.72 ml   Output --   Net 65.72 ml       Physical Exam Performed:    BP (!) 96/58   Pulse 73   Temp 98.2 °F (36.8 °C) (Oral)   Resp 16   Ht 5' 2\" (1.575 m)   Wt 221 lb 3.2 oz (100.3 kg)   SpO2 96%   BMI 40.46 kg/m²     General appearance: No apparent distress, appears stated age and cooperative. HEENT: Pupils equal, round, and reactive to light. Conjunctivae/corneas clear. Neck: Supple, with full range of motion. No jugular venous distention. Trachea midline. Respiratory:  Normal respiratory effort. Clear to auscultation, bilaterally without Rales/Wheezes/Rhonchi. Cardiovascular: Regular rate and rhythm with normal S1/S2 without murmurs, rubs or gallops. Abdomen: Soft, non-tender, non-distended with normal bowel sounds. Musculoskeletal: No clubbing, cyanosis or edema bilaterally. Full range of motion without deformity. Skin: Skin color, texture, turgor normal.  No rashes or lesions. Neurologic:  Neurovascularly intact without any focal sensory/motor deficits.  Cranial nerves: II-XII intact, grossly non-focal.  Psychiatric: Alert and oriented, thought content appropriate, normal insight  Capillary Refill: Brisk,< 3 seconds   Peripheral Pulses: +2 palpable, equal bilaterally       Labs:   Recent Labs     05/25/21  1709 05/26/21  0724   WBC 8.0 8.8   HGB 12.1 11.4*   HCT 36.7 34.7*    169     Recent Labs     05/25/21  1709 05/26/21  0724   * 131*   K 4.1 4.9   CL 90* 92*   CO2 25 24   BUN 16 22*   CREATININE 4.7* 6.1*   CALCIUM 9.8 9.9     Recent Labs     05/25/21  1709   AST 26   ALT 33   BILITOT 0.5   ALKPHOS 146*     Recent Labs     05/24/21  1608 05/25/21  1709 05/26/21  0957   INR 4.8* 4.11* 1.60*     Recent Labs     05/25/21  1709 05/25/21  2252 05/26/21  0133   TROPONINI 1.71* 1.66* 1.55*       Urinalysis:      Lab Results   Component Value Date    NITRU Negative 02/05/2015    WBCUA 3-5 02/05/2015    BACTERIA 1+ 02/05/2015    RBCUA 0-2 02/05/2015    BLOODU Negative 02/05/2015    SPECGRAV 1.020 02/05/2015    GLUCOSEU 100 02/05/2015       Radiology:  XR CHEST PORTABLE   Final Result   Cardiomegaly with no evidence of pulmonary edema         CT HEAD WO CONTRAST   Final Result   No acute intracranial abnormality. Diffuse atrophic changes with findings suggesting chronic microvascular   ischemia                 DVT Prophylaxis: supra therapeutic INR  Diet: Diet NPO Effective Now  Code Status: Full Code    PT/OT Eval Status: ordered eval    Dispo - home once medically stable     Azael Vega, APRN - CNP      NOTE:  This report was transcribed using voice recognition software. Every effort was made to ensure accuracy; however, inadvertent computerized transcription errors may be present.

## 2021-05-26 NOTE — PROGRESS NOTES
4 Eyes Skin Assessment     NAME:  Shlomo Chavarria  YOB: 1940  MEDICAL RECORD NUMBER:  [id number]    The patient is being assess for  Admission    I agree that 2 RN's have performed a thorough Head to Toe Skin Assessment on the patient. ALL assessment sites listed below have been assessed. Areas assessed by both nurses:    Head, Face, Ears, Shoulders, Back, Chest, Arms, Elbows, Hands, Sacrum. Buttock, Coccyx, Ischium and Legs. Feet and Heels        Does the Patient have a Wound?  No noted wound(s)       Victor M Prevention initiated:  Yes   Wound Care Orders initiated:  No    Pressure Injury (Stage 3,4, Unstageable, DTI, NWPT, and Complex wounds) if present place consult order under [de-identified] No    New and Established Ostomies if present place consult order under : No      Nurse 1 eSignature: Electronically signed by Crystal Martinez RN on 5/26/21 at 12:24 AM EDT    **SHARE this note so that the co-signing nurse is able to place an eSignature**    Nurse 2 eSignature: Electronically signed by Alo Hernandez RN on 5/26/21 at 12:24 AM EDT

## 2021-05-26 NOTE — PROGRESS NOTES
Treatment time: 135 minutes; ended early per Dr. Adama Rodrigez d/t hypotension    Net UF: positive 600 ml    Pre weight: 99.2 kg  Post weight: 99.7 kg  EDW: 98.5 kg    Access used: LUE AVF  Access function: good flow; 15g 1 in needles    Medications or blood products given: Midodrine 10 mg po; total of 600 ml NS for hypotension    Regular outpatient schedule: MWF    Summary of response to treatment: Hypotensive and bradycardic throughout treatment; Dr. Adama Rodrigez aware, order to stop HD if SB/P dropped below 90. At 1400, patient very restless w/ c/o back pain BP 78/36, updated physician- returned blood an discontinued treatment. Post /36, back pain resolved. Report to Fernando Rice RN. Copy of dialysis treatment record placed in chart, to be scanned into EMR.

## 2021-05-27 PROBLEM — I35.0 NONRHEUMATIC AORTIC VALVE STENOSIS: Status: ACTIVE | Noted: 2021-01-01

## 2021-05-27 PROBLEM — I27.20 PULMONARY HYPERTENSION (HCC): Status: ACTIVE | Noted: 2021-01-01

## 2021-05-27 PROBLEM — R94.31 ABNORMAL ECG: Status: ACTIVE | Noted: 2021-01-01

## 2021-05-27 PROBLEM — Z95.1 S/P CABG (CORONARY ARTERY BYPASS GRAFT): Status: ACTIVE | Noted: 2021-01-01

## 2021-05-27 PROBLEM — I25.5 ISCHEMIC CARDIOMYOPATHY: Status: ACTIVE | Noted: 2021-01-01

## 2021-05-27 NOTE — PROGRESS NOTES
stenosis with mean gradient across the aortic valve of 13 mmHg and calculated aortic valve area was 1.64 square centimeters, all suggesting mild aortic stenosis.   -Moderate tricuspid regurgitation. Estimated pulmonary artery systolic pressure is mildly to moderately elevated at 65 mmHg assuming a right atrial pressure of 3 mmHg. Assessment/Plan:  Principal Problem:    NSTEMI (non-ST elevated myocardial infarction) [facility])  Plan: Chest discomfort free now. Currently on IV heparin. 2D echo Doppler with mild to moderate reduction in LV systolic function with EF of 40%. Discussed options of more invasive work-up versus conservative management. She favors conservative management. Continue aspirin. High intensity statin. We will try low-dose beta-blocker, and if tolerated, low-dose ACE inhibitor/ARB. Patient does take midodrine so hypotension may be a limiting factor. Would start with beta-blocker first.  Would also add Ranexa. Active Problems:    Coronary atherosclerosis of native coronary artery  Plan: Severe native. S/P remote CABG. Attempt medical therapy. S/P CABG (coronary artery bypass graft)  Plan: Remote. Medical therapy. ESRD on hemodialysis ([facility]  Plan: Chronic. Per nephrology. Supratherapeutic INR  Plan: Resolved. Currently on IV heparin. Can resume warfarin. Ischemic cardiomyopathy  Plan: Acute. LVEF 40%. low-dose beta-blocker, and if tolerated, low-dose ACE inhibitor/ARB. Patient does take midodrine so hypotension may be a limiting factor. Would start with beta-blocker first.  Would also add Ranexa. Abnormal ECG  Plan: Suggestive of lateral ischemia. Currently asymptomatic. 10 medical management. Pulmonary hypertension ([facility]  Plan: Moderate. Continue to follow. Mixed hyperlipidemia  Plan: High intensity statin. Essential hypertension  Plan: Presently stable but does have lability requiring management particularly on dialysis days. Chronic diastolic heart failure ([facility]  Plan: Near compensated. Atrial fibrillation ([facility]  Plan: Currently sinus rhythm. Resume anticoagulation with warfarin. Nonrheumatic aortic valve stenosis  Plan: Moderate, but may be moderate to severe when accounting for LV systolic dysfunction. Chronic anemia  Plan: Continue to monitor. Discussed in detail with the patient. Options of medical management versus stress testing versus cardiac catheterization. Patient states stress have not been beneficial for her in the past.  She would ultimately favor conservative management. We will attempt to optimize therapy with beta-blocker and ACE inhibitor/ARB as hemodynamics allow. Also addition of Ranexa. If angina fails medical therapy, will revisit cardiac catheterization. Good and pertinent questions asked and answered. Discussed with primary cardiologist Dr. Ventura Basilio.     Joceline Luis MD, MD 5/27/2021 12:13 PM    Critical care time: 45 minutes

## 2021-05-27 NOTE — PROGRESS NOTES
Hospitalist Progress Note      PCP: Shanthi Garrett MD    Date of Admission: 5/25/2021    Chief Complaint: Shortness of breath, dizziness    Hospital Course: An 25-year-old  female presenting  to the hospital with chief complaints of a few weeks history of subacute  onset of gradually progressive intermittent worsening shortness of  breath which was significantly more when the patient tried to do any  activity, was partially relieved when the patient rested, not associated  with any nausea or vomiting. The patient did have significantly  increasing fatigue, dizziness with 2 or 3 falls in the past week. The  patient also is on dialysis Monday, Wednesday, Friday and notes that  after the dialysis she especially became really dizzy. Subjective: Patient reports that when she moves side to side in bed or stands up she becomes very dizzy. She associates it since the cyst have appeared on her scalp for the last 2 months. Denies any shortness of breath or chest pain at this time. Patient denied chest pain, shortness of breath, palpitations, abdominal pain, nausea vomiting, diarrhea, dysuria. Appetite good. Voiding without difficulty. Reviewed plan of care with patient, verbalized understanding and agreement. Denied further questions or needs.       Assessment/Plan:    NSTEMI  -Troponin elevated, however patient is end-stage renal disease on dialysis  -EKG with new changes  -Cardiology consulted to evaluate further, awaiting plan   -Continue telemetry monitoring  -INR 1.6  (4.11)  -on heparin drp  -Continue aspirin and statin    End-stage renal disease on dialysis  -Plan for dialysis today  -Nephrology on board    Vertigo  -Trial of meclizine  -PT eval, as patient may benefit from vestibular training  -Check orthostatic blood pressure- discussed with nursing   -CT head nonacute    Past medical history hypertension, hyperlipidemia, type 2 diabetes  -Continue current medications as General appearance: No apparent distress, appears stated age and cooperative. HEENT: Pupils equal, round, and reactive to light. Conjunctivae/corneas clear. Neck: Supple, with full range of motion. No jugular venous distention. Trachea midline. Respiratory:  Normal respiratory effort. Clear to auscultation, bilaterally without Rales/Wheezes/Rhonchi. Cardiovascular: Regular rate and rhythm with normal S1/S2 without murmurs, rubs or gallops. Abdomen: Soft, non-tender, non-distended with normal bowel sounds. Musculoskeletal: No clubbing, cyanosis or edema bilaterally. Full range of motion without deformity. Skin: Skin color, texture, turgor normal.  No rashes or lesions. Neurologic:  Neurovascularly intact without any focal sensory/motor deficits.  Cranial nerves: II-XII intact, grossly non-focal.  Psychiatric: Alert and oriented, thought content appropriate, normal insight  Capillary Refill: Brisk,< 3 seconds   Peripheral Pulses: +2 palpable, equal bilaterally       Labs:   Recent Labs     05/25/21  1709 05/26/21  0724   WBC 8.0 8.8   HGB 12.1 11.4*   HCT 36.7 34.7*    169     Recent Labs     05/25/21  1709 05/26/21  0724   * 131*   K 4.1 4.9   CL 90* 92*   CO2 25 24   BUN 16 22*   CREATININE 4.7* 6.1*   CALCIUM 9.8 9.9   PHOS  --  3.9     Recent Labs     05/25/21  1709   AST 26   ALT 33   BILITOT 0.5   ALKPHOS 146*     Recent Labs     05/25/21  1709 05/26/21  0957 05/27/21  0745   INR 4.11* 1.60* 1.19*     Recent Labs     05/25/21  1709 05/25/21  2252 05/26/21  0133   TROPONINI 1.71* 1.66* 1.55*       Urinalysis:      Lab Results   Component Value Date    NITRU Negative 02/05/2015    WBCUA 3-5 02/05/2015    BACTERIA 1+ 02/05/2015    RBCUA 0-2 02/05/2015    BLOODU Negative 02/05/2015    SPECGRAV 1.020 02/05/2015    GLUCOSEU 100 02/05/2015       Radiology:  XR CHEST PORTABLE   Final Result   Cardiomegaly with no evidence of pulmonary edema         CT HEAD WO CONTRAST   Final Result   No acute intracranial abnormality. Diffuse atrophic changes with findings suggesting chronic microvascular   ischemia                 DVT Prophylaxis: supra therapeutic INR  Diet: DIET RENAL; No Caffeine  Code Status: DNR-CCA    PT/OT Eval Status: ordered eval    Dispo - home once medically stable, requests [address], APRN - CNP      NOTE:  This report was transcribed using voice recognition software. Every effort was made to ensure accuracy; however, inadvertent computerized transcription errors may be present.

## 2021-05-27 NOTE — PROGRESS NOTES
Occupational Therapy   Occupational Therapy Initial Assessment  Date: 2021   Patient Name: Brett Dash  MRN: [id number]     : 1940    Date of Service: 2021    Discharge Recommendations:    Brett Dash scored a 17/24 on the AM-PAC ADL Inpatient form. Current research shows that an AM-PAC score of 17 or less is typically not associated with a discharge to the patient's home setting. Based on the patient's AM-PAC score and their current ADL deficits, it is recommended that the patient have 5-7 sessions per week of Occupational Therapy at d/c to increase the patient's independence. At this time, this patient demonstrates the endurance, and/or tolerance for 3 hours of therapy each day, with a treatment frequency of 5-7x/wk. Please see assessment section for further patient specific details. If patient discharges prior to next session this note will serve as a discharge summary. Please see below for the latest assessment towards goals. Should pt refuse therapy recommendations and d/c home:  [address] [place] [address]: LEVEL 3 SAFETY     - Initial home health evaluation to occur within 24-48 hours, in patient home   - Therapy evaluations in home within 24-48 hours of discharge; including DME and home safety   - Frontload therapy 5 days, then 3x a week   - Therapy to evaluate if patient has [address] needs for personal care   -  evaluation within 24-48 hours, includes evaluation of resources and insurance to determine [place], [place], LTC, and Medicaid options     OT Equipment Recommendations  Equipment Needed: Yes  Mobility Devices: Wheelchair    Assessment   Performance deficits / Impairments: Decreased ADL status; Decreased functional mobility ; Decreased strength;Decreased endurance;Decreased balance;Decreased high-level IADLs  Assessment: Pt is currently functioning below occupational baseline and demo the deficits listed above, pt would benefit from continued skilled OT services to address these deficits and increase independence, safety, and ease with all occupational pursuits  Treatment Diagnosis: Decreased ADL/IADL status, functional mobility, and functional transfer d/t NSTEMI (non-ST elevated myocardial infarction) ([facility]  Prognosis: Good  Decision Making: Medium Complexity  OT Education: OT Role;Plan of Care;Transfer Training;Energy Conservation  Patient Education: eval, d/c planning- pt verbalized understanding  REQUIRES OT FOLLOW UP: Yes  Activity Tolerance  Activity Tolerance: Patient Tolerated treatment well;Patient limited by fatigue  Activity Tolerance: 02 above 90% throughout entire session, SOB still present after ambulation requiring seated rest break to recover  Safety Devices  Safety Devices in place: Yes  Type of devices: All fall risk precautions in place;Call light within reach; Left in chair;Chair alarm in place;Nurse notified; Patient at risk for falls           Patient Diagnosis(es): The primary encounter diagnosis was NSTEMI (non-ST elevated myocardial infarction) ([facility] Diagnoses of Other fatigue, CRUZ (dyspnea on exertion), Syncope and collapse, and Supratherapeutic INR were also pertinent to this visit. has a past medical history of Arthritis, Blood circulation, collateral, CAD (coronary artery disease), CHF (congestive heart failure) ([facility] Chronic kidney disease, Diabetes mellitus ([facility] Hemodialysis patient ([facility] Hypercholesteremia, Hypertension, Other disorders of kidney and ureter, Pneumonia, and Thyroid disease. has a past surgical history that includes Coronary artery bypass graft; Cardiac surgery; cyst removal; Refractive surgery; Tonsillectomy; other surgical history (Left, 12/18/14); Dialysis fistula creation (12/18/14); Abdomen surgery; eye surgery; and vascular surgery.     Treatment Diagnosis: Decreased ADL/IADL status, functional mobility, and functional transfer d/t NSTEMI (non-ST elevated myocardial infarction) [facility])      Restrictions  Restrictions/Precautions  Restrictions/Precautions: Fall Risk (L UE No BP, high fall risk)  Required Braces or Orthoses?: No  Position Activity Restriction  Other position/activity restrictions: Per H&P by Dr. Luz Rivera on 1126: 75-year-old  female presentingto the hospital with chief complaints of a few weeks history of subacuteonset of gradually progressive intermittent worsening shortness ofbreath which was significantly more when the patient tried to do anyactivity, was partially relieved when the patient rested, not associatedwith any nausea or vomiting. The patient did have significantlyincreasing fatigue, dizziness with 2 or 3 falls in the past week. The patient also is on dialysis Monday, Wednesday, Friday and notes thatafter the dialysis she especially became really dizzy. Subjective   General  Chart Reviewed: Yes  Patient assessed for rehabilitation services?: Yes  Additional Pertinent Hx: PMH: Arthritis, Blood circulation, collateral, CAD (coronary artery disease), CHF (congestive heart failure) ([facility] Chronic kidney disease, Diabetes mellitus ([facility] Hemodialysis patient ([facility] Hypercholesteremia, Hypertension, Other disorders of kidney and ureter, Pneumonia, and Thyroid disease  Family / Caregiver Present: No  Referring Practitioner: TOBY Chowdhury CNP  Diagnosis: NSTEMI (non-ST elevated myocardial infarction) ([facility]  Subjective  Subjective: Pt in recliner chair upon arrival, pleasant and agreeable to OT/PT evaluation  Patient Currently in Pain: Denies (Simultaneous filing. User may not have seen previous data.)  Vital Signs  Patient Currently in Pain: Denies (Simultaneous filing.  User may not have seen previous data.)  Social/Functional History  Social/Functional History  Lives With: Family (Niece and niece's spouse, she is able to provide 24/7 assist)  Type of Home: House  Home Layout: Two level, Bed/Bath upstairs (Stair lift to 2nd floor, keeps a rollator on each floor)  Home Access: Ramped entrance (with railings)  Bathroom Shower/Tub: Walk-in shower, Shower chair with back  Bathroom Toilet: Standard  Home Equipment: 4 wheeled walker, Cane, Long-handled shoehorn  ADL Assistance: [address]: Needs assistance (Family takes care of cooking, cleaning, laundry)  Ambulation Assistance: Independent (with 5WQ)  Transfer Assistance: Independent (with [address])  Active : No  Patient's  Info: Family drives pt to appointment  Additional Comments: 3 falls this month, all due to passing out, EMS has helped her up each time. Pt wants a w/c for when she is out in the community and a tere lift for when she falls. Objective   Orientation  Overall Orientation Status: Within Normal Limits  Observation/Palpation  Posture: Fair  Edema: B LE swelling  Balance  Sitting Balance: Stand by assistance  Standing Balance: Contact guard assistance  Standing Balance  Time: 5-7 minutes  Activity: functional mobility, functional transfers  Comment: no LOB, use of RW. Decreased standing tolerance d/t fatigue and SOB, therefore rest breaks utilized  Functional Mobility  Functional - Mobility Device: Rolling Walker  Activity: Other  Assist Level: Contact guard assistance  Functional Mobility Comments: Pt ambulated 15' in room with CGA requiring verbal cueing for safe foot placement within RW.  Decreased standing tolerance d/t fatigue and SOB requiring seated rest break before ambulating back to recliner  ADL  LE Dressing: Setup (demo ability to killian/doff socks)  Additional Comments: pt declined additional ADLs  Tone RUE  RUE Tone: Normotonic  Tone LUE  LUE Tone: Normotonic  Coordination  Movements Are Fluid And Coordinated: Yes  Bed mobility  Comment: Unable to assess pt in recliner chair at SOS and EOS  Transfers  Sit to stand: Minimal assistance  Stand to sit: Contact guard assistance (decreased eccentric control)  Transfer Comments: min(A) to initally stand from

## 2021-05-27 NOTE — PROGRESS NOTES
Physical Therapy    Facility/Department: [address]  Initial Assessment    NAME: Migel Castro  : 1940  MRN: [id number]    Date of Service: 2021    Discharge Recommendations:  Migel Castro scored a 16/24 on the AM-PAC short mobility form. Current research shows that an AM-PAC score of 17 or less is typically not associated with a discharge to the patient's home setting. Based on the patient's AM-PAC score and their current functional mobility deficits, it is recommended that the patient have 5-7 sessions per week of Physical Therapy at d/c to increase the patient's independence. At this time, this patient demonstrates the endurance, and/or tolerance for 3 hours of therapy each day, with a treatment frequency of 5-7x/wk. Pt would benefit from additional skilled physical therapy in a rehab setting. If pt declines recommend home with 24/7 assist and front load home PT (level 3 HH). Please see assessment section for further patient specific details. If patient discharges prior to next session this note will serve as a discharge summary. Please see below for the latest assessment towards goals. PT Equipment Recommendations  Equipment Needed: Yes  Mobility Devices: Wheelchair    Assessment   Body structures, Functions, Activity limitations: Decreased functional mobility ; Decreased ADL status; Decreased strength;Decreased balance;Decreased endurance  Assessment: Pt is an 77 yo woman admitted to [facility] for NSTEMI. The patient has had multiple recent falls due passing out. Today the patient required min A for sit>stand for the first trial, but required only CGA for the second trial. The patient fatigued quickly with ambulating 15 ft x2 and required a seated rest break, however she appeared more confident in her mobility following the session.  The patient is highly motivated to participate in therapy and would benefit from continued skilled physical therapy to safely progress tolerance to gradually progressive intermittent worsening shortness ofbreath which was significantly more when the patient tried to do anyactivity, was partially relieved when the patient rested, not associatedwith any nausea or vomiting. The patient did have significantlyincreasing fatigue, dizziness with 2 or 3 falls in the past week. The patient also is on dialysis Monday, Wednesday, Friday and notes thatafter the dialysis she especially became really dizzy. Vision/Hearing  Vision: Impaired  Vision Exceptions: Wears glasses at all times  Hearing: Within functional limits       Subjective  General  Chart Reviewed: Yes  Patient assessed for rehabilitation services?: Yes  Family / Caregiver Present: No  Diagnosis: NSTEMI  Follows Commands: Within Functional Limits  Subjective  Subjective: Pt sitting up in chair upon therapist arrival. Pt would like to stand up for a bit. Pain Screening  Patient Currently in Pain: Denies (Simultaneous filing. User may not have seen previous data.)       Orientation  Orientation  Overall Orientation Status: Within Normal Limits     Social/Functional History  Social/Functional History  Lives With: Family (Niece and niece's spouse, she is able to provide 24/7 assist)  Type of Home: House  Home Layout: Two level, Bed/Bath upstairs (Stair lift to 2nd floor, keeps a rollator on each floor)  Home Access: Ramped entrance (with railings)  Bathroom Shower/Tub: Walk-in shower, Shower chair with back  Bathroom Toilet: [address]: 4 wheeled walker, [place], Long-handled shoehorn  ADL Assistance: [address]: Needs assistance (Family takes care of cooking, cleaning, laundry)  Ambulation Assistance: Independent (with 0BJ)  Transfer Assistance: Independent (with RWW)  Active : No  Patient's  Info: Family drives pt to appointment  Additional Comments: 3 falls this month, all due to passing out, EMS has helped her up each time.  Pt wants a w/c for when she is out

## 2021-05-27 NOTE — PLAN OF CARE
Problem: Skin Integrity:  Goal: Will show no infection signs and symptoms  Description: Will show no infection signs and symptoms  5/27/2021 1107 by Donna Power RN  Outcome: Ongoing  5/27/2021 0405 by Andres Piper RN  Outcome: Ongoing

## 2021-05-27 NOTE — PROGRESS NOTES
Nephrology Progress Note  [phone]  [phone]   [email or web address]    Patient:  Karmen Ramos   : 1940    Brief HPI    The patient is a 78 y. o.female with significant past medical history of ESRD on HD MWF, CAD/CABG/CHF, DM II, Hypertension came in with worsening shortness of breath. No h/o exertional chest pain. Found to have elevated troponins, admitted for NSTEMI and cardiology consulted. She is on dialysis MWF at [address], last dialysis was on  as per schedule and is due today  Does need midodrine with dialysis often, has intradialytic hypotension, due for dialysis today. BPs have been 80s-90s/30s-50s since admission.     Subjective/Interval history    Pt seen and examined  Could not tolerate dialysis yesterday, terminated early  On room air  Hypotension improved but BPs low normal  No chest pains or sob  Bradycardia improved    Review of Systems   No abdominal pain or nausea/emesis    SHx:  No visitors at the bed-side    Meds:  Scheduled Meds:   midodrine  10 mg Oral Q MWF    meclizine  12.5 mg Oral 4 times per day    insulin glargine  15 Units Subcutaneous Nightly    insulin lispro  0-6 Units Subcutaneous TID     insulin lispro  0-3 Units Subcutaneous Nightly    pantoprazole  40 mg Oral Daily    sevelamer  800 mg Oral TID    rosuvastatin  40 mg Oral Daily    sodium chloride flush  5-40 mL Intravenous 2 times per day    aspirin  81 mg Oral Daily     Continuous Infusions:   heparin (PORCINE) Infusion 6 Units/kg/hr (21 0050)    dextrose      sodium chloride       PRN Meds:.acetaminophen, heparin (porcine), heparin (porcine), glucose, dextrose, glucagon (rDNA), dextrose, sodium chloride flush, sodium chloride, ondansetron      Vitals:  BP (!) 100/58   Pulse 67   Temp 97.6 °F (36.4 °C) (Oral)   Resp 20   Ht 5' 2\" (1.575 m)   Wt 222 lb 0.1 oz (100.7 kg)   SpO2 96%   BMI 40.60 kg/m²       Physical Exam  General : AAOx3, not in pain or respiratory distress, resting in bed  HEENT : normocephalic, atraumatic, mucosa moist, no palor or icterus  CVS: S1 S2 normal, regular rhythm, no murmurs or rubs. Lungs: Clear, no wheezing or crackles. Abd: Soft, bowel sounds normal, non-tender. Ext: LE edema +, no cyanosis  Skin: Warm. No rashes appreciated. : bladder non-distended, no tenderness over the bladder  Access : left arm AVF+, thrill/bruit+      Labs:  CBC with Differential:    Lab Results   Component Value Date    WBC 8.8 05/26/2021    RBC 3.36 05/26/2021    HGB 11.4 05/26/2021    HCT 34.7 05/26/2021     05/26/2021    .5 05/26/2021    MCH 34.1 05/26/2021    MCHC 33.0 05/26/2021    RDW 15.0 05/26/2021    BANDSPCT 2 12/12/2018    LYMPHOPCT 17.6 05/25/2021    MONOPCT 10.2 05/25/2021    BASOPCT 0.6 05/25/2021    MONOSABS 0.8 05/25/2021    LYMPHSABS 1.4 05/25/2021    EOSABS 0.1 05/25/2021    BASOSABS 0.0 05/25/2021     BMP:    Lab Results   Component Value Date     05/26/2021    K 4.9 05/26/2021    CL 92 05/26/2021    CO2 24 05/26/2021    BUN 22 05/26/2021    LABALBU 3.7 05/25/2021    CREATININE 6.1 05/26/2021    CALCIUM 9.9 05/26/2021    GFRAA 8 05/26/2021    GFRAA 23 05/15/2013    LABGLOM 7 05/26/2021    GLUCOSE 215 05/26/2021     Ionized Calcium:  No results found for: IONCA  Magnesium:    Lab Results   Component Value Date    MG 1.90 12/17/2018     Phosphorus:    Lab Results   Component Value Date    PHOS 3.9 05/26/2021       Assessment/Plan:    1. ESRD on HD MWF  HD today as per schedule  Will need midodrine with dialysis, fluid removal has been challenging  May need scheduled midodrine  Next HD in AM  2. NSTEMI   Awaiting cardiology input  3. Shortness of breath  ?angina  CXR with no evidence of pulmonary edema  4. Hyponatremia mild  Expect improvement with dialysis  5. Anemia in CKD  Hb at goal.  No need of ESAs  6.  CKD-MBD check phos  Continue yesica Mcdonnell MD, MD.  5/27/2021  Office Phone : [phone]    Thank you for allowing us to participate in the care of this pt. I willcontinue to follow along. Please call with questions or concerns.

## 2021-05-27 NOTE — PLAN OF CARE
Pt A/O throughout night, respirations even/unlabored on RA. Heparin gtt infusing, no other needs at this time. Vitals are stable, fall precautions in place, call light in reach. Vitals:    05/26/21 2352   BP: 110/75   Pulse: 73   Resp: 20   Temp: 97.6 °F (36.4 °C)   SpO2: 96%       Problem: Pain:  Goal: Pain level will decrease  Description: Pain level will decrease  Outcome: Ongoing  Goal: Control of acute pain  Description: Control of acute pain  Outcome: Ongoing  Goal: Control of chronic pain  Description: Control of chronic pain  Outcome: Ongoing     Problem: Skin Integrity:  Goal: Will show no infection signs and symptoms  Description: Will show no infection signs and symptoms  Outcome: Ongoing  Goal: Absence of new skin breakdown  Description: Absence of new skin breakdown  Outcome: Ongoing     Problem: Falls - Risk of:  Goal: Will remain free from falls  Description: Will remain free from falls  Outcome: Ongoing  Goal: Absence of physical injury  Description: Absence of physical injury  Outcome: Ongoing     Problem:  Activity:  Goal: Capacity to carry out activities will improve  Description: Capacity to carry out activities will improve  Outcome: Ongoing  Goal: Will verbalize the importance of balancing activity with adequate rest periods  Description: Will verbalize the importance of balancing activity with adequate rest periods  Outcome: Ongoing     Problem: Cardiac:  Goal: Hemodynamic stability will improve  Description: Hemodynamic stability will improve  Outcome: Ongoing  Goal: Ability to maintain an adequate cardiac output will improve  Description: Ability to maintain an adequate cardiac output will improve  Outcome: Ongoing     Problem: Coping:  Goal: Verbalizations of decreased anxiety will decrease  Description: Verbalizations of decreased anxiety will decrease  Outcome: Ongoing     Problem: Fluid Volume:  Goal: Risk for excess fluid volume will decrease  Description: Risk for excess fluid volume will decrease  Outcome: Ongoing  Goal: Maintenance of adequate hydration will improve  Description: Maintenance of adequate hydration will improve  Outcome: Ongoing  Goal: Will show no signs and symptoms of electrolyte imbalance  Description: Will show no signs and symptoms of electrolyte imbalance  Outcome: Ongoing     Problem: Health Behavior:  Goal: Ability to manage health-related needs will improve  Description: Ability to manage health-related needs will improve  Outcome: Ongoing  Goal: Ability to seek appropriate health care will improve  Description: Ability to seek appropriate health care will improve  Outcome: Ongoing     Problem: Nutritional:  Goal: Maintenance of adequate nutrition will improve  Description: Maintenance of adequate nutrition will improve  Outcome: Ongoing     Problem: Physical Regulation:  Goal: Complications related to the disease process, condition or treatment will be avoided or minimized  Description: Complications related to the disease process, condition or treatment will be avoided or minimized  Outcome: Ongoing     Problem: Respiratory:  Goal: Ability to maintain adequate ventilation will improve  Description: Ability to maintain adequate ventilation will improve  Outcome: Ongoing  Goal: Respiratory status will improve  Description: Respiratory status will improve  Outcome: Ongoing

## 2021-05-27 NOTE — PROGRESS NOTES
Clinical Pharmacy Note: Pharmacy to Dose Warfarin    Pharmacy consulted by Internal medicine to resume warfarin. Lyndsey Sandoval is a 78 y.o. female  is receiving warfarin for indication: Afib. INR Goal Range: 2-3  Prior to admission warfarin dosing regimen: 0 mg every Mon, Tue; 2 mg (2 mg x 1) all other days  INR today:   Lab Results   Component Value Date    INR 1.19 05/27/2021       Assessment/Plan:  INR is subtherapeutic due to warfarin reversal for INR 4.8 on admission. Vit K 5mg IV x 1 on 5/25  Currently on heparin drip. \"Can resume warfarin. \" per cardiology note. Based on today's assessment, dose warfarin 2mg daily. Daily INR is ordered. Pharmacy will continue to monitor and make adjustments to regimen as necessary.      Mitzy Small, PharmD  Clinical Pharmacist [id number]  5/27/2021

## 2021-05-28 NOTE — PROGRESS NOTES
Nephrology Progress Note  [phone]   [email or web address]    Patient:  Kun Bennett   : 1940    Brief HPI    The patient is a 78 y. o.female with significant past medical history of ESRD on HD MWF, CAD/CABG/CHF, DM II, Hypertension came in with worsening shortness of breath. No h/o exertional chest pain. Found to have elevated troponins, admitted for NSTEMI and cardiology consulted. She is on dialysis MWF at [address], last dialysis was on  as per schedule and is due today  Does need midodrine with dialysis often, has intradialytic hypotension, due for dialysis today. BPs have been 80s-90s/30s-50s since admission.     Subjective/Interval history    Pt seen and examined on dialysis  Tolerating well  Denies chest pain or sob  BPs maintained  On room air  Possible discharge today    Review of Systems   No abdominal pain or nausea/emesis    SHx:  No visitors at the bed-side    Meds:  Scheduled Meds:   warfarin  2 mg Oral Daily    midodrine  10 mg Oral Q MWF    meclizine  12.5 mg Oral 4 times per day    insulin glargine  15 Units Subcutaneous Nightly    insulin lispro  0-6 Units Subcutaneous TID WC    insulin lispro  0-3 Units Subcutaneous Nightly    pantoprazole  40 mg Oral Daily    sevelamer  800 mg Oral TID    rosuvastatin  40 mg Oral Daily    sodium chloride flush  5-40 mL Intravenous 2 times per day    aspirin  81 mg Oral Daily     Continuous Infusions:   dextrose      sodium chloride       PRN Meds:.albumin human, acetaminophen, glucose, dextrose, glucagon (rDNA), dextrose, sodium chloride flush, sodium chloride, ondansetron      Vitals:  BP (!) 107/40   Pulse 66   Temp 98 °F (36.7 °C)   Resp 16   Ht 5' 2\" (1.575 m)   Wt 224 lb 13.9 oz (102 kg)   SpO2 95%   BMI 41.13 kg/m²       Physical Exam  General : AAOx3, not in pain or respiratory distress, resting in bed  HEENT : normocephalic, atraumatic, mucosa moist, no palor or icterus  CVS: S1 S2 normal, regular rhythm, no murmurs or rubs. Lungs: Clear, no wheezing or crackles. Abd: Soft, bowel sounds normal, non-tender. Ext: LE edema +, no cyanosis  : bladder non-distended, no tenderness over the bladder  Access : left arm AVF+, accessed      Labs:  CBC with Differential:    Lab Results   Component Value Date    WBC 8.7 05/28/2021    RBC 3.34 05/28/2021    HGB 11.4 05/28/2021    HCT 34.0 05/28/2021     05/28/2021    .6 05/28/2021    MCH 34.1 05/28/2021    MCHC 33.6 05/28/2021    RDW 15.0 05/28/2021    BANDSPCT 2 12/12/2018    LYMPHOPCT 17.6 05/25/2021    MONOPCT 10.2 05/25/2021    BASOPCT 0.6 05/25/2021    MONOSABS 0.8 05/25/2021    LYMPHSABS 1.4 05/25/2021    EOSABS 0.1 05/25/2021    BASOSABS 0.0 05/25/2021     BMP:    Lab Results   Component Value Date     05/28/2021    K 4.1 05/28/2021    K 4.9 05/26/2021    CL 92 05/28/2021    CO2 29 05/28/2021    BUN 23 05/28/2021    LABALBU 3.4 05/28/2021    CREATININE 6.4 05/28/2021    CALCIUM 10.2 05/28/2021    GFRAA 8 05/28/2021    GFRAA 23 05/15/2013    LABGLOM 6 05/28/2021    GLUCOSE 97 05/28/2021     Ionized Calcium:  No results found for: IONCA  Magnesium:    Lab Results   Component Value Date    MG 1.90 12/17/2018     Phosphorus:    Lab Results   Component Value Date    PHOS 4.0 05/28/2021       Assessment/Plan:    1. ESRD on HD MWF  HD today as per schedule  Hypotension limiting UF, no fluid removal last dialysis due to hypotension and bradycardia requiring early termination on 5/26  UF 3 litres today, cooled to 35.5, given Albumin and midodrine  May need scheduled midodrine  Discussed orders with dialysis RN    2. NSTEMI   Medical management for now    3. Hyponatremia mild  Expect improvement with dialysis  4. Anemia in CKD  Hb at goal.  No need of ESAs  5.  CKD-MBD  Phos at goal   Continue sevelamer    Plan  Ok to discharge from renal perspective    Renee Amato MD, MD.  5/28/2021  Office Phone : [phone]    Thank you for allowing us to participate in the care of this pt. I willcontinue to follow along. Please call with questions or concerns.

## 2021-05-28 NOTE — PLAN OF CARE
Data- discharge order received, pt verbalized agreement to discharge, disposition to previous residence, no needs for HHC/DME. Action- discharge instructions prepared and given to pt , pt verbalized understanding. Medication information packet given r/t NEW and/or CHANGED prescriptions emphasizing name/purpose/side effects, pt verbalized understanding. Discharge instruction summary: Diet- CCRenal, Activity- AS tolerated, Primary Care Physician as follows: Niranjan Howard -[phone] f/u appointment one week, immunizations reviewed and none, prescription medications filled at [facility]. CHF Education reviewed. Pt/ Family has had a total of 60 minutes CHF education this admission encounter. Response- Pt belongings gathered, IV removed. Disposition is home (no HHC/DME needs), transported with Niece, taken to lobby via w/c w/ RN, no complications.

## 2021-05-28 NOTE — CARE COORDINATION
Discharge planning note:    Patient reports she has been working with [facility] for DME - [facility]. I spoke with [facility] [phone] and they confirmed they have orders and everything has been submitted to insurance. DME will be delivered to home when approved. Called discharge into [facility] (patients preference), for RN, PT/OT/Aide services. Discussed SNF or ARU and patient declines.     Jluis Cat RN BSN  Case Management  [phone]

## 2021-05-28 NOTE — CONSULTS
[facility] FAILURE PROGRAM      Everardo Stoll 1940    History:  Past Medical History:   Diagnosis Date    Arthritis     Blood circulation, collateral     CAD (coronary artery disease)     CHF (congestive heart failure) (HCC)     Chronic kidney disease     [facility]     Hemodialysis patient ([facility]     Mon. Wed. Fri.  Hypercholesteremia     Hypertension     Other disorders of kidney and ureter     Pneumonia     pneumonia    Thyroid disease        ECHO:   Summary   Overall left ventricular systolic function is mild to moderately depressed . Ejection fraction is visually estimated to be 40 %. E/e'= 11.5   There is moderate hypokinesis of the septalwalls. There is mild concentric left ventricular hypertrophy. Grade III diastolic dysfunction with elevated LV filling pressures. Mitral annular calcification is present. The mitral valve leaflets appear myxomatous. Moderate mitral regurgitation. Aortic valve leaflets appear calcified. The aortic valve area is calculated at .83 cm2 with a maximum pressure   gradient of 22 mmHg and a mean pressure gradient of 14 mmHg. This is c/w   moderate aortic stenosis. Mild aortic regurgitation. The right ventricle is moderately enlarged. Right ventricular systolic function is moderately reduced . TAPSE= 1.77 RV S'= 6.53   Moderate tricuspid regurgitation. Estimated pulmonary artery systolic pressure is moderately elevated at 61   mmHg assuming a right atrial pressure of 15 mmHg. IVC size is dilated (>2.1 cm) and collapses < 50% with respiration   consistent with elevated RA pressure (15 mmHg).     ACE/ARB: not on CKD  BB: patient could not tolerate bb d/t hypotension  Aldosterone Antagonist: not on CKD      DM History: Yes  DM medications:  lantus 15 units nightly, insulin 15 units twice a day  Consult for DM educator: No      Last Hospital Admission: 12/10/18 with volume overload  Code Status: DNR CCA  Discharge plans: home with [facility]    Family Present: johnny Holland was admitted to the hospital with some shortness of breath and for NSTEMI. Patient has CKD and is ESRD on HD on MWF. Patient has been seen in the past for education. Was asked to see for chronic HF. Patient is usually weighed at dialysis with a dry weight of 99 kg. She states she used to only be up 1-2 kg but recently has noticed to be more than that. She does not add salt to season or flavor foods. Had previously followed 2500 mg sodium restriction but admits that has \"loosened\" some. She also restricts to 36 oz of fluid per day She states compliance with that. She is compliant with her medications and follow up visits. Patient provided with both written and verbal education on CHF signs/ symptoms, causes, discharge medications, daily weights, low sodium diet, activity, and follow-up. Pt to call if gains 3 pounds in one day or 5 pounds in one week. Mutually agreed upon goals were discussed such as calling the MD as soon as they recognize symptoms and weight gain, maintaining his proper diet, taking medications as prescribed, joining rehab when able. Patient provided with CHF Zone Management tool and CHF symptoms magnet. Discussed importance of lifestyle changes: encouraged following sodium and fluid restrictions    PATIENT/CAREGIVER TEACHING:    Level of patient/caregiver understanding able to:   [x ] Verbalize understanding [ ] Demonstrate understanding [ ] Teach back   [ ] Needs reinforcement [ ] Other:       Time spent teachin mins    1. WEIGHT: Admit Weight: 215 lb (97.5 kg)      Today  Weight: 224 lb 13.9 oz (102 kg)   2. I/O     Intake/Output Summary (Last 24 hours) at 2021 1548  Last data filed at 2021 1818  Gross per 24 hour   Intake 171.13 ml   Output --   Net 171.13 ml       Recommendations:   1. Patient will need a follow up with cardiology after discharge  2.  Continue to educate on S/S.   3. Emphasize daily weights, diet, and knowing when and who to call  4. Provided patient with CHF Resource Line for questions and concerns.        ODIN DIAZ RN 5/28/2021 3:48 PM

## 2021-05-28 NOTE — PROGRESS NOTES
Physical Therapy      Pt currently off the floor at dialysis. Will continue to follow and re-attempt as schedule permits. Thank you. Osiris Smiley PT, DPT [id number]  Justice Christensen OTR/L [id number], 5/28/2021, 11:12 AM

## 2021-05-28 NOTE — DISCHARGE SUMMARY
Patient denied chest pain, shortness of breath, palpitations, abdominal pain, nausea vomiting, diarrhea, dysuria, headache lightheadedness or dizziness. Appetite good. Voiding without difficulty. Reviewed plan of care with patient, verbalized understanding and agreement. Denied further questions or needs. Physical Exam Performed:     BP (!) 105/50   Pulse 68   Temp 97.2 °F (36.2 °C) (Oral)   Resp 18   Ht 5' 2\" (1.575 m)   Wt 218 lb 4.1 oz (99 kg)   SpO2 95%   BMI 39.92 kg/m²       General appearance:  No apparent distress, appears stated age and cooperative. HEENT:  Normal cephalic, atraumatic without obvious deformity. Pupils equal, round, and reactive to light. Extra ocular muscles intact. Conjunctivae/corneas clear. Neck: Supple, with full range of motion. No jugular venous distention. Trachea midline. Respiratory:  Normal respiratory effort. Clear to auscultation, bilaterally without Rales/Wheezes/Rhonchi. Cardiovascular:  Regular rate and rhythm with normal S1/S2 without murmurs, rubs or gallops. Abdomen: Soft, non-tender, non-distended with normal bowel sounds. Musculoskeletal:  No clubbing, cyanosis or edema bilaterally. Full range of motion without deformity. Skin: Skin color, texture, turgor normal.  No rashes or lesions. Neurologic:  Neurovascularly intact without any focal sensory/motor deficits. Cranial nerves: II-XII intact, grossly non-focal.  Psychiatric:  Alert and oriented, thought content appropriate, normal insight  Capillary Refill: Brisk,< 3 seconds   Peripheral Pulses: +2 palpable, equal bilaterally       Labs:  For convenience and continuity at follow-up the following most recent labs are provided:      CBC:    Lab Results   Component Value Date    WBC 8.7 05/28/2021    HGB 11.4 05/28/2021    HCT 34.0 05/28/2021     05/28/2021       Renal:    Lab Results   Component Value Date     05/28/2021    K 4.1 05/28/2021    K 4.9 05/26/2021    CL 92 05/28/2021 CO2 29 05/28/2021    BUN 23 05/28/2021    CREATININE 6.4 05/28/2021    CALCIUM 10.2 05/28/2021    PHOS 4.0 05/28/2021         Significant Diagnostic Studies    Radiology:   XR CHEST PORTABLE   Final Result   Cardiomegaly with no evidence of pulmonary edema         CT HEAD WO CONTRAST   Final Result   No acute intracranial abnormality. Diffuse atrophic changes with findings suggesting chronic microvascular   ischemia                Consults:     IP CONSULT TO CARDIOLOGY  PHARMACY TO DOSE MEDICATION  IP CONSULT TO NEPHROLOGY  IP CONSULT TO HEART FAILURE NURSE/COORDINATOR  IP CONSULT TO PHARMACY  IP CONSULT TO HOME CARE NEEDS    Disposition: Home    Condition at Discharge: Stable    Discharge Instructions/Follow-up:      Follow up with pcp in 1 week  Follow up with cardiology as directed   INR in one week    Code Status:  Prior     Activity: activity as tolerated    Diet: cardiac diet      Discharge Medications:     Discharge Medication List as of 5/28/2021  5:11 PM           Details   ranolazine (RANEXA) 500 MG extended release tablet Take 1 tablet by mouth 2 times daily, Disp-60 tablet, R-0Normal      aspirin 81 MG chewable tablet Take 1 tablet by mouth daily, Disp-30 tablet, R-0Normal              Details   rosuvastatin (CRESTOR) 40 MG tablet Take 1 tablet by mouth daily, Disp-30 tablet, R-0Normal              Details   warfarin (COUMADIN) 2 MG tablet TAKE ONE TABLET BY MOUTH DAILY OR AS DIRECTED BASED ON INR RESULTS, Disp-60 tablet, R-0Normal      pantoprazole (PROTONIX) 40 MG tablet Take 40 mg by mouth dailyHistorical Med      midodrine (PROAMATINE) 10 MG tablet Take 10-20 mg by mouth 3 times daily Only take if instructed by dialysis Historical Med      Misc.  Devices (ROLLATOR) MISC DAILY Starting Thu 12/27/2018, Disp-1 each, R-0, Print      insulin lispro (HUMALOG) 100 UNIT/ML injection vial Inject 15 Units into the skin every morning (before breakfast) sliding scaleHistorical Med      cinacalcet (SENSIPAR) 30 MG tablet Take 30 mg by mouth every other day Historical Med      ACCU-CHEK COMPACT PLUS strip Historical Med      LANTUS 100 UNIT/ML injection vial Inject 15 Units into the skin See Admin Instructions Holds for a BS < 100, does a sliding scale with LantusHistorical Med      COMFORT ASSIST INSULIN SYRINGE 30G X 5/16\" 0.5 ML MISC Historical Med      RENVELA 800 MG tablet Take 1 tablet by mouth 3 times daily Historical Med      Cholecalciferol (VITAMIN D PO) Take 50,000 Units by mouth every 14 days Historical Med      levothyroxine (SYNTHROID) 25 MCG tablet Take 25 mcg by mouth Daily. fish oil-omega-3 fatty acids 1000 MG capsule Take 1 g by mouth 3 times daily. docusate sodium (COLACE) 100 MG capsule Take 100 mg by mouth daily. Time Spent on discharge is more than 30 minutes in the examination, evaluation, counseling and review of medications and discharge plan. Signed: TOBY Salmon - CNP   5/29/2021    The patient was seen and examined on day of discharge and this discharge summary is in conjunction with any daily progress note from day of discharge. Thank you Aric Benjamin MD for the opportunity to be involved in this patient's care. If you have any questions or concerns please feel free to contact me at [phone]. NOTE:  This report was transcribed using voice recognition software. Every effort was made to ensure accuracy; however, inadvertent computerized transcription errors may be present.

## 2021-05-28 NOTE — DISCHARGE INSTR - COC
Continuity of Care Form    Patient Name: Vince Holland   :  1940  MRN:  [id number]    Admit date:  2021  Discharge date:  21    Code Status Order: DNR-CCA   Advance Directives:   [facility] Documentation       Date/Time Healthcare Directive Type of Healthcare Directive Copy in [address] [facility] [address] Agent's Name Healthcare Agent's Phone Number    [phone]  Yes, patient has an advance directive for healthcare treatment  --  --  Healthcare power of  niophelia  [facility]  [phone]            Admitting Physician:  Bobbette Sandhoff, MD  PCP: Celestino Mathias MD    Discharging Nurse: [facility] Unit/Room#: 4GZ-2904/9612-99  Discharging Unit Phone Number: ***    Emergency Contact:   Extended Emergency Contact Information  Primary Emergency Contact: Herman Long Phone: [phone]  Work Phone: [phone]  Relation: Niece/Nephew    Past Surgical History:  Past Surgical History:   Procedure Laterality Date    ABDOMEN SURGERY      CARDIAC SURGERY      CORONARY ARTERY BYPASS GRAFT      4 grafts, approx 5 yrs ago    CYST REMOVAL      DIALYSIS FISTULA CREATION  14    EYE SURGERY      cataracts removed/leaks    OTHER SURGICAL HISTORY Left 14    LEFT RADIOCEPHALIC FISTULA    REFRACTIVE SURGERY      TONSILLECTOMY      VASCULAR SURGERY         Immunization History:   Immunization History   Administered Date(s) Administered    COVID-19, J&J, PF, 0.5 mL 2021    Influenza Vaccine, unspecified formulation 10/30/2016, 10/30/2018    Influenza Virus Vaccine 2014    Pneumococcal Conjugate 7-valent (Louvella Hannah) 2007       Active Problems:  Patient Active Problem List   Diagnosis Code    Diabetes education, encounter for Z71.89    Mixed hyperlipidemia E78.2    Essential hypertension I10    Coronary atherosclerosis of native coronary artery I25.10    Anemia D64.9    Chronic renal insufficiency, stage IV (severe) ([facility] N18.4    Acute respiratory failure with hypoxemia ([facility]) J96.01    Chronic diastolic heart failure ([facility]) I50.32    Hyperkalemia E87.5    Chronic renal failure, stage 4 (severe) ([facility]) N18.4    Chronic anemia D64.9    Poorly controlled type 2 diabetes mellitus ([facility]) E11.65    GERD (gastroesophageal reflux disease) K21.9    Morbid obesity with BMI of 40.0-44.9, adult ([facility]) E66.01, Z68.41    Hypoxemia R09.02    ESRD on dialysis ([facility]) N18.6, Z99.2    Kidney failure N19    Stenosis of carotid artery I65.29    Acute respiratory failure with hypoxia ([facility]) J96.01    Volume overload E87.70    Respiratory failure with hypoxia ([facility]) J96.91    Acute pulmonary edema ([facility]) J81.0    Diabetic nephropathy associated with type 2 diabetes mellitus ([facility]) E11.21    Coronary artery disease due to lipid rich plaque I25.10, I25.83    Obesity, Class III, BMI 40-49.9 (morbid obesity) ([facility]) E66.01    ESRD on hemodialysis ([facility]) N18.6, Z99.2    Atrial fibrillation ([facility]) I48.91    Weight loss counseling, encounter for Z71.3    NSTEMI (non-ST elevated myocardial infarction) ([facility] I21.4    Supratherapeutic INR R79.1    Ischemic cardiomyopathy I25.5    Abnormal ECG R94.31    Pulmonary hypertension ([facility]) I27.20    Nonrheumatic aortic valve stenosis I35.0    S/P CABG (coronary artery bypass graft) Z95.1       Isolation/Infection:   Isolation            No Isolation          Patient Infection Status       None to display            Nurse Assessment:  Last Vital Signs: BP (!) 101/49   Pulse 67   Temp 98 °F (36.7 °C)   Resp 20   Ht 5' 2\" (1.575 m)   Wt 224 lb 13.9 oz (102 kg)   SpO2 95%   BMI 41.13 kg/m²     Last documented pain score (0-10 scale): Pain Level: 0  Last Weight:   Wt Readings from Last 1 Encounters:   05/28/21 224 lb 13.9 oz (102 kg)     Mental Status:  {IP PT MENTAL STATUS:20030:::0}    IV Access:  {[facility] IV ACCESS:111869232:::0}    Nursing Mobility/ADLs:  Walking   {[facility] DME ADLs:566413090:::0}  Transfer  {[facility] DME ADLs:123742854:::0}  Bathing  {[facility] DME ADLs:364872602:::0}  Dressing  {[facility] DME ADLs:372171083:::0}  Toileting  {[facility] DME ADLs:756966377:::0}  Feeding  {[facility] DME ADLs:344176564:::0}  Med Admin  {[facility] DME ADLs:458420577:::0}  Med Delivery   { HO MED Delivery:301853548:::0}    Wound Care Documentation and Therapy:        Elimination:  Continence:   · Bowel: {YES / WO:01621}  · Bladder: {YES / WD:93586}  Urinary Catheter: {Urinary Catheter:105615555:::0}   Colostomy/Ileostomy/Ileal Conduit: {YES / MZ:89070}       Date of Last BM: ***    Intake/Output Summary (Last 24 hours) at 2021 1512  Last data filed at 2021 1818  Gross per 24 hour   Intake 171.13 ml   Output --   Net 171.13 ml     I/O last 3 completed shifts:   In: 171.1 [I.V.:171.1]  Out: -     Safety Concerns:     [address] Safety Concerns:784305258:::0}    Impairments/Disabilities:      [address] Impairments/Disabilities:936822701:::0}    Nutrition Therapy:  Current Nutrition Therapy:   [address] Diet List:816436937:::0}    Routes of Feeding: {[facility] DME Other Feedings:257635085:::0}  Liquids: {Slp liquid thickness:00614}  Daily Fluid Restriction: {[facility] DME Yes amt example:874409878:::0}  Last Modified Barium Swallow with Video (Video Swallowing Test): {Done Not Done DPFF:075847663:::3}    Treatments at the Time of Hospital Discharge:   Respiratory Treatments: ***  Oxygen Therapy:  {Therapy; copd oxygen:76878:::0}  Ventilator:    { CC Vent List:433557693:::0}    Rehab Therapies: SN PT OT HHA  Weight Bearing Status/Restrictions: { CC Weight Bearin:::0}  Other Medical Equipment (for information only, NOT a DME order):  {EQUIPMENT:253262343}  Other Treatments: INR IN ONE WEEK  HOME HEALTH CARE: LEVEL 3 SAFETY     Home health agency to establish plan of care for patient over 60 day period    Nursing   Initial home SN evaluation visit to occur within 24-48 hours for:   medication management   VS and clinical assessment   S&S chronic disease exacerbation education + when to contact MD / NP   care coordination   Medication Reconciliation during 1st SN visit     PT/OT   Evaluations in home within 24-48 hours of discharge to include DME and home safety   [facility] Front[facility] therapy 5 days, then 3x a week    OT to evaluate if patient has [address] needs for personal care       evaluation within 24-48 hours to evaluate resources & insurance for potential [place], [place], LTC, and Medicaid options       Palliative Care referral within 5 days of hospital discharge  PCP Visit scheduled within 3 - 7 days of hospital discharge      Telehealth-Homecare Vitals(If patient is agreeable and meets guidelines)    Patient's personal belongings (please select all that are sent with patient):  {[facility] DME Belongings:911879366:::0}    RN SIGNATURE:  {Esignature:544586456:::0}    CASE MANAGEMENT/SOCIAL WORK SECTION    Inpatient Status Date: 5/28/21    Readmission Risk Assessment Score:  Readmission Risk              Risk of Unplanned Readmission:  22           Discharging to Facility/ Agency   FACILITY:     [address] [place] [address]:   [address] [place] [address]   PHONE:        [phone]:              [phone]     / signature: Electronically signed by Zara Méndez RN on 5/28/21 at 3:21 PM EDT    PHYSICIAN SECTION    Prognosis: Good    Condition at Discharge: Stable    Rehab Potential (if transferring to Rehab): Good    Recommended Labs or Other Treatments After Discharge: pt/ot. Rn, aide, inr draw in one week    Physician Certification: I certify the above information and transfer of Jennifer Bain  is necessary for the continuing treatment of the diagnosis listed and that she requires Home Care for greater 30 days.      Update Admission H&P: No change in H&P    PHYSICIAN SIGNATURE:  Electronically signed by TOBY Marshall CNP on 5/28/21 at 3:18 PM EDT/ DR Sera Snowden

## 2021-05-28 NOTE — PROGRESS NOTES
[facility] Daily Progress Note      Admit Date:  5/25/2021    Chief Complaint: Chest discomfort, non-STEMI    Subjective:  Ms. Vito Maurice denies chest discomfort or shortness of breath. Currently on dialysis. Does complain of back pain.     Objective:   BP (!) 115/47   Pulse 69   Temp 98 °F (36.7 °C)   Resp 16   Ht 5' 2\" (1.575 m)   Wt 224 lb 13.9 oz (102 kg)   SpO2 95%   BMI 41.13 kg/m²       Intake/Output Summary (Last 24 hours) at 5/28/2021 1041  Last data filed at 5/27/2021 1818  Gross per 24 hour   Intake 171.13 ml   Output --   Net 171.13 ml       TELEMETRY: Sinus     Physical Exam:  General:  Awake, alert, oriented x 3, NAD  Skin:  Warm and dry  Neck:  JVD 5 cm  Chest:  normal air entry  Cardiovascular:  RRR S1S2, no S3, 2/6 systolic at 2nd left intercostal space, at 2nd right intercostal space  Abdomen:  Soft, ND, NT, No HSM  Extremities: Trace edema    Medications:    warfarin  2 mg Oral Daily    midodrine  10 mg Oral Q MWF    meclizine  12.5 mg Oral 4 times per day    insulin glargine  15 Units Subcutaneous Nightly    insulin lispro  0-6 Units Subcutaneous TID WC    insulin lispro  0-3 Units Subcutaneous Nightly    pantoprazole  40 mg Oral Daily    sevelamer  800 mg Oral TID    rosuvastatin  40 mg Oral Daily    sodium chloride flush  5-40 mL Intravenous 2 times per day    aspirin  81 mg Oral Daily      dextrose      sodium chloride       albumin human, acetaminophen, glucose, dextrose, glucagon (rDNA), dextrose, sodium chloride flush, sodium chloride, ondansetron    Lab Data:  CBC:   Recent Labs     05/25/21  1709 05/26/21  0724 05/28/21  0454   WBC 8.0 8.8 8.7   HGB 12.1 11.4* 11.4*   HCT 36.7 34.7* 34.0*   .6* 103.5* 101.6*    169 173     BMP:   Recent Labs     05/25/21  1709 05/26/21  0724 05/28/21  0454   * 131* 133*   K 4.1 4.9 4.1   CL 90* 92* 92*   CO2 25 24 29   PHOS  --  3.9 4.0   BUN 16 22* 23*   CREATININE 4.7* 6.1* 6.4*     LIVER PROFILE:   Recent of the aortic valve with evidence of mild aortic stenosis with mean gradient across the aortic valve of 13 mmHg and calculated aortic valve area was 1.64 square centimeters, all suggesting mild aortic stenosis.   -Moderate tricuspid regurgitation. Estimated pulmonary artery systolic pressure is mildly to moderately elevated at 65 mmHg assuming a right atrial pressure of 3 mmHg. Assessment/Plan:  Principal Problem:    NSTEMI (non-ST elevated myocardial infarction) ([facility]  Plan: Remains chest discomfort free. Can discontinue IV heparin. 2D echo Doppler with mild to moderate reduction in LV systolic function with EF of 40%. Discussed options of more invasive work-up versus conservative management. She favors conservative management. Continue aspirin. High intensity statin. We will try low-dose beta-blocker, and if tolerated, low-dose ACE inhibitor/ARB. Patient does take midodrine so hypotension may be a limiting factor. Would start with beta-blocker first.  Ranexa added. Active Problems:    Coronary atherosclerosis of native coronary artery  Plan: Severe native. S/P remote CABG. Attempt medical therapy. S/P CABG (coronary artery bypass graft)  Plan: Remote. Medical therapy. ESRD on hemodialysis ([facility]  Plan: Chronic. Per nephrology. Supratherapeutic INR  Plan: Resolved. Can resume warfarin. Ischemic cardiomyopathy  Plan: Acute. LVEF 40%. low-dose beta-blocker, and if tolerated, low-dose ACE inhibitor/ARB. Patient does take midodrine so hypotension may be a limiting factor. Would start with beta-blocker first.  Ranexa added. Abnormal ECG  Plan: Suggestive of lateral ischemia. Currently asymptomatic. Continue medical management. Pulmonary hypertension ([facility]  Plan: Moderate. Continue to follow. Mixed hyperlipidemia  Plan: High intensity statin.       Essential hypertension  Plan: Presently stable but does have lability requiring management particularly on dialysis days. Chronic diastolic heart failure ([facility]  Plan: Near compensated. Volume removal dependent on dialysis. Atrial fibrillation ([facility]  Plan: Currently sinus rhythm. Resume anticoagulation with warfarin. Nonrheumatic aortic valve stenosis  Plan: Moderate, but may be moderate to severe when accounting for LV systolic dysfunction. Chronic anemia  Plan: Continue to monitor. Discussed in detail with the patient. Options of medical management versus stress testing versus cardiac catheterization. Patient states stress tests have not been beneficial for her in the past.  She would ultimately favor conservative management. We will attempt to optimize therapy with beta-blocker and ACE inhibitor/ARB as hemodynamics allow. Ranexa added. .  If angina fails medical therapy, will revisit cardiac catheterization. Good and pertinent questions asked and answered. Okay for discharge from cardiology standpoint. She will follow-up with her primary cardiologist Dr. Rosa Maria Correa, of our group.     Michael Castillo MD, MD 5/28/2021 10:41 AM

## 2021-05-29 NOTE — PROGRESS NOTES
Physician Progress Note      Rick Alejandra  CSN #:                  [id number]  :                       1940  ADMIT DATE:       2021 6:41 PM  [address] DATE:        2021 6:16 PM  RESPONDING  PROVIDER #:        Amado Sheth          QUERY TEXT:    Patient admitted with BMI 40.42. If possible, please document in progress   notes and discharge summary if you are evaluating and /or treating any of the   following: The medical record reflects the following:  Risk Factors: Non-STEMI, ESRD, DM 2, HTN, CAD  Clinical Indicators: Per documentation BMI 40.42 on admission. Treatment: Nephrology/Cardiology Consults, Diet Carb Controlled, Daily   Weights, monitor labs  Thank you [email or web address]  Options provided:  -- Obesity  -- Morbid obesity  -- Overweight  -- BMI not clinically significant  -- Other - I will add my own diagnosis  -- Disagree - Not applicable / Not valid  -- Disagree - Clinically unable to determine / Unknown  -- Refer to Clinical Documentation Reviewer    PROVIDER RESPONSE TEXT:    This patient has obesity.     Query created by: Tasha Gupta on 2021 8:24 AM      Electronically signed by:  Amado Sheth 2021 11:04 PM

## 2021-05-30 PROBLEM — I50.43 ACUTE ON CHRONIC COMBINED SYSTOLIC AND DIASTOLIC CONGESTIVE HEART FAILURE (HCC): Status: ACTIVE | Noted: 2021-01-01

## 2021-05-30 NOTE — ED NOTES
Pt placed on 3 l/min because her SpO2 was dipping into the high 80's. After placing pt on 3 l/min SpO2 came up to 100%.      Maggie Hernández RN  05/30/21 5306

## 2021-05-30 NOTE — FLOWSHEET NOTE
Treatment time: 2 hours  Net UF: 1400 ml     Pre weight: 102.8 kg   Post weight: 101.4 kg       Access used: LFA AVF  Access function: Good with  ml/min     Medications or blood products given: N/A     Regular outpatient schedule: MWF     Summary of response to treatment:      05/30/21 1545 05/30/21 1745   Vital Signs   BP (!) 108/51 (!) 101/45   Temp 96.9 °F (36.1 °C) 96 °F (35.6 °C)   Pulse 64 52   Resp 18 18   Weight 226 lb 10.1 oz (102.8 kg) 224 lb 10.4 oz (101.9 kg)   Weight Method Actual;Bed scale Actual;Bed scale   Percent Weight Change 0 -0.88   Pain Assessment   Pain Assessment 0-10 0-10   Pain Level 4 4   Post-Hemodialysis Assessment   Post-Treatment Procedures  --  Blood returned; Access bleeding time < 10 minutes   Machine Disinfection Process  --  Acid/Vinegar Clean;Heat Disinfect; Exterior Machine Disinfection   Rinseback Volume (ml)  --  400 ml   Total Liters Processed (l/min)  --  38.7 l/min   Dialyzer Clearance  --  Moderately streaked   Duration of Treatment (minutes)  --  120 minutes   Heparin amount administered during treatment (units)  --  0 units   Hemodialysis Intake (ml)  --  400 ml   Hemodialysis Output (ml)  --  1800 ml   NET Removed (ml)  --  1400 ml   Tolerated Treatment  --  Good   Copy of dialysis treatment record placed in chart, to be scanned into EMR.  Report called to BELLE Pepe.

## 2021-05-30 NOTE — ED TRIAGE NOTES
Pt arrived to dept via EMS from home. Pt c/o generalized weakness, lethargy and bilateral foot swelling. Weakness has been going on for at least 7 days and she was recently hospitalized at [facility] for similar symptoms and was released 3 days ago. The bilateral foot swelling started this morning. Pt awake, alert and oriented x 3. Skin warm and dry/normal color for ethnicity. Resp easy and unlabored. Pt placed in gown and on cardiac monitor. Call light in reach. Will continue to monitor.

## 2021-05-30 NOTE — ED NOTES
Report called to Efrain ODONNELL to assume care when pt arrives to floor from dialysis. All questions answered.       [address] Pramod, RN  05/30/21 4069

## 2021-05-30 NOTE — ED PROVIDER NOTES
[address] [facility]      Pt Name: Page Patel  MRN: [id number]  [place] 1940  Date of evaluation: 5/30/2021  Provider: Jose Cortez MD    CHIEF COMPLAINT     Per nursing:   Chief Complaint   Patient presents with    Fatigue     Pt c/o generalized weakness x 7 days and bilateral foot swelling since this morning.  Foot Swelling       Per my evaluation: Generalized weakness and lethargy    HISTORY OF PRESENT ILLNESS   (Location/Symptom, Timing/Onset,Context/Setting, Quality, Duration, Modifying Factors, Severity)  Note limiting factors. Page Patel is a 78 y.o. female who presents to the emergency department for evaluation of generalized weakness and lethargy as well as bilateral foot swelling. Patient was just admitted to [facility] for treatment of an NSTEMI. She had presented with a gradually progressive shortness of breath was found to have an elevated troponin. Patient did have an echocardiogram during that admission which showed an EF of 40%. She was started on Ranexa at that time as well. Patient does have a history of soft blood pressure, and takes midodrine with her dialysis treatment. She states she is never taken midodrine outside of dialysis. Her last dialysis was Friday before leaving the hospital and was normal treatment. She states she has been doing well at home until this morning when she was too weak to get out of bed. Her daughter thought that she was responding more slowly to questions. She also was noted to have bilateral foot swelling which is a new issue. Patient denies any chest pain. She states that she has had continued shortness of breath since leaving the hospital. She denies any fever, chills, abdominal pain, urinary symptoms. NursingNotes were reviewed. REVIEW OF SYSTEMS    (2-9 systems for level 4, 10 or more for level 5)       Constitutional: No fever or chills. Eye: No visual disturbances.  No INSULIN LISPRO (HUMALOG) 100 UNIT/ML INJECTION VIAL    Inject 15 Units into the skin every morning (before breakfast) sliding scale    LANTUS 100 UNIT/ML INJECTION VIAL    Inject 15 Units into the skin See Admin Instructions Holds for a BS < 100, does a sliding scale with Lantus    LEVOTHYROXINE (SYNTHROID) 25 MCG TABLET    Take 25 mcg by mouth Daily. MIDODRINE (PROAMATINE) 10 MG TABLET    Take 10-20 mg by mouth 3 times daily Only take if instructed by dialysis     MISC.  DEVICES (ROLLATOR) MISC    1 Device by Does not apply route daily    PANTOPRAZOLE (PROTONIX) 40 MG TABLET    Take 40 mg by mouth daily    RANOLAZINE (RANEXA) 500 MG EXTENDED RELEASE TABLET    Take 1 tablet by mouth 2 times daily    RENVELA 800 MG TABLET    Take 1 tablet by mouth 3 times daily     ROSUVASTATIN (CRESTOR) 40 MG TABLET    Take 1 tablet by mouth daily    WARFARIN (COUMADIN) 2 MG TABLET    TAKE ONE TABLET BY MOUTH DAILY OR AS DIRECTED BASED ON INR RESULTS       ALLERGIES     Metoprolol, Lidocaine, Lisinopril, Betadine [povidone iodine], and Levaquin [levofloxacin in d5w]    FAMILY HISTORY       Family History   Problem Relation Age of Onset    Heart Disease Father     High Blood Pressure Father     High Cholesterol Father     Heart Attack Father     Diabetes Maternal Uncle     Diabetes Maternal Aunt           SOCIAL HISTORY       Social History     Socioeconomic History    Marital status: Single     Spouse name: None    Number of children: None    Years of education: 15    Highest education level: None   Occupational History    None   Tobacco Use    Smoking status: Never Smoker    Smokeless tobacco: Never Used   Vaping Use    Vaping Use: Never used   Substance and Sexual Activity    Alcohol use: No    Drug use: No    Sexual activity: None   Other Topics Concern    None   Social History Narrative    None     Social Determinants of Health     Financial Resource Strain:     Difficulty of Paying Living Expenses: Food Insecurity:     Worried About Running Out of Food in the Last Year:     [address] in the Last Year:    Transportation Needs:     Lack of Transportation (Medical):  Lack of Transportation (Non-Medical):    Physical Activity:     Days of Exercise per Week:     Minutes of Exercise per Session:    Stress:     Feeling of Stress :    Social Connections:     Frequency of Communication with Friends and Family:     Frequency of Social Gatherings with Friends and Family:     Attends [employer] Services:     Active Member of Clubs or Organizations:     Attends Club or Organization Meetings:     Marital Status:    Intimate Partner Violence:     Fear of Current or Ex-Partner:     Emotionally Abused:     Physically Abused:     Sexually Abused:        SCREENINGS             PHYSICAL EXAM    (up to 7 for level 4, 8 or more for level 5)     ED Triage Vitals [05/30/21 1239]   BP Temp Temp Source Pulse Resp SpO2 Height Weight   (!) 80/40 96.6 °F (35.9 °C) Oral 57 17 91 % 5' 2\" (1.575 m) 226 lb 10.1 oz (102.8 kg)       General: Alert and oriented. Eye: Normal conjunctiva. Pupils equal and reactive. HENT: Oral mucosa is moist.  Respiratory: Lungs are clear to auscultation, Respirations are non-labored. Cardiovascular: Normal rate, Regular rhythm. Gastrointestinal: Soft, Non-tender, Non-distended. Musculoskeletal: Pitting edema to the upper calf bilaterally, slightly more pronounced on the left. Feet are warm, sensation motor function intact. Integumentary: Warm, Dry. Neurologic: Alert, Oriented, No focal defects. Psychiatric: Cooperative.     DIAGNOSTIC RESULTS     EKG: All EKG's are interpreted by the Emergency Department Physician who either signs or Co-signsthis chart in the absence of a cardiologist.    Per my interpretation:    Electrocardiogram (ECG) 5/30/2021  RATE: normal  RHYTHM: normal sinus  AXIS: normal  INTERVALS: normal  ST-T WAVE CHANGES: No evidence of ST segment elevation, T wave inversions with slight depression in V4 through V6, not changed from prior  Prior for comparison 5/26/2021    RADIOLOGY:   Non-plain filmimages such as CT, Ultrasound and MRI are read by the radiologist. Plain radiographic images are visualized and preliminarily interpreted by the emergency physician with the below findings:      Interpretation per the Radiologist below, if available at the time ofthis note:    XR CHEST (2 VW)   Preliminary Result   1. New mild pulmonary edema, without acute airspace consolidation. 2. Stable cardiomegaly.                ED BEDSIDE ULTRASOUND:   Performed by ED Physician - none    LABS:  Labs Reviewed   CBC WITH AUTO DIFFERENTIAL - Abnormal; Notable for the following components:       Result Value    RBC 3.44 (*)     Hemoglobin 11.9 (*)     Hematocrit 35.4 (*)     .9 (*)     MCH 34.7 (*)     All other components within normal limits    Narrative:     Performed at:  [address] [address]   Phone [phone]   COMPREHENSIVE METABOLIC PANEL W/ REFLEX TO MG FOR LOW K - Abnormal; Notable for the following components:    Sodium 129 (*)     Potassium reflex Magnesium 6.1 (*)     Chloride 92 (*)     Glucose 218 (*)     BUN 23 (*)     CREATININE 6.1 (*)     GFR Non- 7 (*)     GFR African American 8 (*)     Alkaline Phosphatase 140 (*)     All other components within normal limits    Narrative:     Nuzhat Fernandez tel. [phone],  Chemistry results called to and read back by Sammi Barton RN, 05/30/2021  13:53, by [address] results called to and read back by Sammi Barton RN, 05/30/2021  13:44, by PELSU  Performed at:  [address] [address]   Phone [phone]   TROPONIN - Abnormal; Notable for the following components:    Troponin 1.06 (*)     All other components within normal limits    Narrative:     Nuzhat Fernandez tel. [phone],  Chemistry results called to and read back by Tarsha Morgan RN, 05/30/2021  13:44, by BASSAM  Performed at:  [facility]  [address] [facility] [place] [address]   Phone [phone]   BRAIN NATRIURETIC PEPTIDE - Abnormal; Notable for the following components:    Pro-BNP >70,000 (*)     All other components within normal limits    Narrative:     Radha Dodson tel. [phone],  Chemistry results called to and read back by Tarsha Morgan RN, 05/30/2021  13:53, by [address] results called to and read back by Tarsha Morgan RN, 05/30/2021  13:44, by BASSAM  Performed at:  [facility]  [address] [facility] [place] [address]   Phone [phone]   POCT GLUCOSE - Abnormal; Notable for the following components:    POC Glucose 199 (*)     All other components within normal limits    Narrative:     Performed at:  [facility]  [address] [facility] [place] [address]   Phone [phone]   URINALYSIS   POCT GLUCOSE   POCT GLUCOSE   POCT GLUCOSE   POCT GLUCOSE   POCT GLUCOSE   POCT GLUCOSE           EMERGENCY DEPARTMENT COURSE and DIFFERENTIAL DIAGNOSIS/MDM:   Vitals:    Vitals:    05/30/21 1239 05/30/21 1245 05/30/21 1315 05/30/21 1500   BP: (!) 80/40 (!) 97/43 (!) 87/54 (!) 113/43   Pulse: 57 58 58 60   Resp: 17 18 20 22   Temp: 96.6 °F (35.9 °C)      TempSrc: Oral      SpO2: 91% (!) 86% 100% 100%   Weight: 226 lb 10.1 oz (102.8 kg)      Height: 5' 2\" (1.575 m)            Medical decision making: This is an 38-year-old female who presents for evaluation of peripheral edema and shortness of breath as well as some decreased mental status per her daughter. On exam she is alert and oriented, she does have intermittently low blood pressures 96Q to 90 systolic. Patient has a history of hypertension particular with dialysis and gets midodrine with dialysis.   She is hypoxic to the mid 80s, placed on 2 L nasal cannula. Otherwise normal vital signs. Presentation is concerning for heart failure, also considered pneumonia, ACS. EKG shows no acute ischemic changes. Troponin is 1.06, however this is downtrending from last troponin which is checked at [facility], on 5/26/2021 when it was 1.55. CBC is unremarkable. CMP shows creatinine of 6.1, sodium is low at 129 and potassium is elevated at 6.1. There is no notable EKG changes therefore patient is treated with insulin and glucose. BNP is significantly elevated greater than 70,000 and chest x-ray shows new pulmonary edema. Given volume overload and inability to make urine, recommended dialysis for treatment of acute pulmonary edema and hyperkalemia. I spoke with Dr. Kvng Fajardo of nephrology who will place dialysis orders. I did give the patient her 10 mg dialysis dose of midodrine while in the emergency department. Also spoke with patient's primary care provider, Dr. Vernon Campuzano who will place admission orders. Stable at the time of admission. CRITICAL CARE TIME   Total Critical Care time was 32 minutes, excluding separately reportable procedures. There was a high probability of clinically significant/life threatening deterioration in the patient's condition which required my urgent intervention. CONSULTS:  IP CONSULT TO NEPHROLOGY  IP CONSULT TO NEPHROLOGY  IP CONSULT TO CARDIOLOGY  IP CONSULT TO NEPHROLOGY  IP CONSULT TO CASE MANAGEMENT  IP CONSULT TO HEART FAILURE NURSE/COORDINATOR  IP CONSULT TO PALLIATIVE CARE    PROCEDURES:  Unless otherwise noted below, none         FINAL IMPRESSION      1. Acute pulmonary edema (HCC)    2. Hyperkalemia    3. Acute on chronic clinical systolic heart failure [facility])          DISPOSITION/PLAN   DISPOSITION Decision To Admit 05/30/2021 02:24:43 PM      PATIENT REFERRED TO:  No follow-up provider specified.     DISCHARGE MEDICATIONS:  New Prescriptions    No medications on file          (Please note that portions of this note were completed with a voice recognition program.Efforts were made to edit the dictations but occasionally words are mis-transcribed.)    Kwan Felder MD (electronically signed)  Attending Emergency Physician            Kwan Felder MD  05/30/21 7341

## 2021-05-31 NOTE — PROGRESS NOTES
4 Eyes Skin Assessment     NAME:  Parish Balbuena  YOB: 1940  MEDICAL RECORD NUMBER:  [id number]    The patient is being assess for  Admission    I agree that 2 RN's have performed a thorough Head to Toe Skin Assessment on the patient. ALL assessment sites listed below have been assessed. Areas assessed by both nurses:    Head, Face, Ears, Shoulders, Back, Chest, Arms, Elbows, Hands, Sacrum. Buttock, Coccyx, Ischium and Legs. Feet and Heels        Does the Patient have a Wound?  No noted wound(s)       Victor M Prevention initiated:  Yes   Wound Care Orders initiated:  No    Pressure Injury (Stage 3,4, Unstageable, DTI, NWPT, and Complex wounds) if present place consult order under [de-identified] No    New and Established Ostomies if present place consult order under : No      Nurse 1 eSignature: Electronically signed by Bishop Viramontes RN on 5/31/21 at 3:42 AM EDT    **SHARE this note so that the co-signing nurse is able to place an eSignature**    Nurse 2 eSignature: Electronically signed by Anni Maloney RN on 5/31/21 at 4:21 AM EDT

## 2021-05-31 NOTE — PROGRESS NOTES
Clinical Pharmacy Note  Warfarin Consult    Isaiah Santoro is a 78 y.o. female receiving warfarin managed by pharmacy. Patient being bridged with none. Warfarin Indication: a fib  Target INR range: 2-3   Dose prior to admission: 2 mg daily. Recent INR 4.8. was given Vit K 10 mg IV on 5-25-21 @ [facility]     Current warfarin drug-drug interactions: Vitamin K     Recent Labs     05/28/21  0454 05/30/21  1300 05/30/21  2037   HGB 11.4* 11.9*  --    HCT 34.0* 35.4*  --    INR 1.11  --  1.37*       Assessment/Plan:    Warfarin 2 mg tonight. Daily PT/INR until stable within therapeutic range. Thank you for the consult. Will continue to follow.   Anita Toro [facility]

## 2021-05-31 NOTE — PROGRESS NOTES
Occupational Therapy Note  Enoc Ariza  [id number]  [id number]     Attempted to see for OT eval however per nurse, pt is being taken to dialysis; will attempt later as schedule permits    Electronically signed by Marcos Knight OT on 5/31/2021 at 9:21 AM

## 2021-05-31 NOTE — PROGRESS NOTES
Physical Therapy  Savannah Brown  [id number]  [id number]    Attempted to see for PT eval however per nurse, pt is being taken to dialysis; will attempt later as schedule permits  [facility], [address] [place] [address], [facility]

## 2021-05-31 NOTE — PROGRESS NOTES
Clinical Pharmacy Note  Warfarin Consult    Kun Bennett is a 78 y.o. female receiving warfarin managed by pharmacy. Warfarin Indication: a fib  Target INR range: 2-3   Dose prior to admission: 2 mg daily. Recent INR 4.8. was given Vit K 10 mg IV on 5-25-21 @ [facility]     Current warfarin drug-drug interactions: None significant    Recent Labs     05/30/21  1300 05/30/21  2037 05/31/21  0521   HGB 11.9*  --  10.9*   HCT 35.4*  --  32.6*   INR  --  1.37* 1.47*       Assessment/Plan:    Warfarin 2 mg tonight. Daily PT/INR until stable within therapeutic range. Thank you for the consult. Will continue to follow.   Suha Turcios, [facility]

## 2021-05-31 NOTE — PROGRESS NOTES
Physical Therapy    Facility/Department: [address] PROGRESSIVE CARE  Initial Assessment    NAME: Lois Richey  : 1940  MRN: [id number]    Date of Service: 2021    Discharge Recommendations:  5-7 sessions per week   PT Equipment Recommendations  Equipment Needed: No  Lois Richey scored a 15/24 on the AM-PAC short mobility form. Current research shows that an AM-PAC score of 17 or less is typically not associated with a discharge to the patient's home setting. Based on the patient's AM-PAC score and their current functional mobility deficits, it is recommended that the patient have 5-7 sessions per week of Physical Therapy at d/c to increase the patient's independence. At this time, this patient demonstrates the endurance, and/or tolerance for 3 hours of therapy each day, with a treatment frequency of 5-7x/wk. Please see assessment section for further patient specific details. If patient discharges prior to next session this note will serve as a discharge summary. Please see below for the latest assessment towards goals. Assessment   Body structures, Functions, Activity limitations: Decreased functional mobility   Assessment: pt is an 79 yo female who was adm to hosp with LE swelling and SOB; pt had just recently been adm to [facility] for NSTEMI. Pt at baseline is Ind with a rollatore for all mobility tasks and care needs. Pt is a high fall risk and well below her baseline. She will benefit from continued therapy at discharge 5-7x/wk to address deficits to assist pt in regaining her premorbid strength/endurance to allow pt to return home. Prognosis: Good  Decision Making: Medium Complexity  PT Education: PT Role;General Safety  Patient Education: discussed need for continued therapy  Barriers to Learning: none  REQUIRES PT FOLLOW UP: Yes  Activity Tolerance  Activity Tolerance: Patient limited by endurance; Patient Tolerated treatment well  Activity Tolerance: limited by breathing status Patient Diagnosis(es): The primary encounter diagnosis was Acute pulmonary edema ([facility] [address] Diagnoses of Hyperkalemia and Acute on chronic clinical systolic heart failure ([address] were also pertinent to this visit. has a past medical history of Arthritis, Blood circulation, collateral, CAD (coronary artery disease), CHF (congestive heart failure) ([address] Chronic kidney disease, Diabetes mellitus ([facility] [address] Hemodialysis patient ([facility] [address] Hypercholesteremia, Hypertension, Other disorders of kidney and ureter, Pneumonia, and Thyroid disease. has a past surgical history that includes Coronary artery bypass graft; Cardiac surgery; cyst removal; Refractive surgery; Tonsillectomy; other surgical history (Left, 12/18/14); Dialysis fistula creation (12/18/14); Abdomen surgery; eye surgery; and vascular surgery. Restrictions  Restrictions/Precautions  Restrictions/Precautions: Fall Risk  Position Activity Restriction  Other position/activity restrictions: dialysis MWF; O2 via NC  Vision/Hearing  Vision: Impaired  Vision Exceptions: Wears glasses at all times  Hearing: Within functional limits     Subjective  General  Chart Reviewed: Yes  Additional Pertinent Hx: per ER note:  Ami Hernandez is a 78 y.o. female who presents to the emergency department for evaluation of generalized weakness and lethargy as well as bilateral foot swelling. Patient was just admitted to [facility] for treatment of an NSTEMI. She had presented with a gradually progressive shortness of breath was found to have an elevated troponin. Patient did have an echocardiogram during that admission which showed an EF of 40%. She was started on Ranexa at that time as well. Patient does have a history of soft blood pressure, and takes midodrine with her dialysis treatment. She states she is never taken midodrine outside of dialysis. Her last dialysis was Friday before leaving the hospital and was normal treatment.  She states she has been doing well at home until this morning when she was too weak to get out of bed. Her daughter thought that she was responding more slowly to questions. She also was noted to have bilateral foot swelling which is a new issue. Patient denies any chest pain. She states that she has had continued shortness of breath since leaving the hospital. She denies any fever, chills, abdominal pain, urinary symptoms.   Response To Previous Treatment: Not applicable  Family / Caregiver Present: No  Follows Commands: Within Functional Limits  Subjective  Subjective: pt very pleasant and agreeable to working with therapy; denies pain  Pain Screening  Patient Currently in Pain: Denies          Orientation  Orientation  Overall Orientation Status: Within Functional Limits  Social/Functional History  Social/Functional History  Lives With:  (Niece)  Type of Home: House  Home Layout: Two level, Bed/Bath upstairs (stair lift to 2nd floor)  Home Access: Ramped entrance  Bathroom Shower/Tub: Walk-in shower, Shower chair with back  Bathroom Toilet: Standard  Bathroom Equipment: Shower chair  Home Equipment: 4 wheeled walker (2 rollators one upstairs and one on main level)  Receives Help From: Family  ADL Assistance: Independent  Ambulation Assistance: Independent  Transfer Assistance: Independent  Active : No  Patient's  Info: Family/niece drives pt to appointment  Occupation: Retired  Type of occupation:   Additional Comments: pt goes to grocery store with her niece every Thurs; reports she has had 3 falls recently due to weakness  Cognition   Cognition  Overall Cognitive Status: WFL    Objective          AROM RLE (degrees)  RLE AROM: WFL  AROM LLE (degrees)  LLE AROM : WFL  Strength RLE  Comment: functionally poor  Strength LLE  Comment: functionally poor        Bed mobility  Supine to Sit: Contact guard assistance (HOB fully elevated; used rails to pull on)  Comment: pt up in chair at end of session  Transfers  Sit to Stand: Minimal Assistance  Stand to sit: Minimal Assistance  Ambulation  Ambulation?: Yes  Ambulation 1  Surface: level tile  Device: Rolling Walker  Assistance: Minimal assistance  Quality of Gait: pt very shaky; very fearful of falling; pt breathing heavily after walk  Gait Deviations: Shuffles; Slow Ml  Distance: 3' to [facility] chair  Comments: assisted with positioning in chair for comfort with LEs elevated and all needs in reach     Balance  Comments: SBA for sitting balance; CGA for static standing with RW      During session, nursing in to give meds, PCP in, Cardiology in and Nephrology in; per Cardiology report, pt will likely be scheduled for heart cath tomorrow    Plan   Plan  Times per week: 3-5  Current Treatment Recommendations: Functional Mobility Training  Safety Devices  Type of devices: Chair alarm in place, Call light within reach, Left in chair, Nurse notified, All fall risk precautions in place    G-Code       OutComes Score                                                  AM-PAC Score  AM-PAC Inpatient Mobility Raw Score : 15 (05/31/21 1252)  AM-PAC Inpatient T-Scale Score : 39.45 (05/31/21 1252)  Mobility Inpatient CMS 0-100% Score: 57.7 (05/31/21 1252)  Mobility Inpatient CMS G-Code Modifier : CK (05/31/21 1252)          Goals  Short term goals  Time Frame for Short term goals: by discharge  Short term goal 1: bed mob MI from flat bed  Short term goal 2: transfers MI  Short term goal 3: amb 100' with RW SBA/sup  Patient Goals   Patient goals : to get stronger and not be a burden on her niece       Therapy Time   Individual Concurrent Group Co-treatment   Time In 1136         Time Out 1245         Minutes 71                 SHU CUNHA PT    Electronically signed by SHU CUNHA PT on 5/31/2021 at 12:54 PM

## 2021-05-31 NOTE — DISCHARGE INSTR - COC
Continuity of Care Form    Patient Name: Arian Pearson   :  1940  MRN:  [id number]    Admit date:  2021  Discharge date:  21    Code Status Order: Full Code   Advance Directives:   [facility] Documentation       Date/Time Healthcare Directive Type of Healthcare Directive Copy in [facility] [address] Agent's Name Healthcare Agent's Phone Number    [phone]  Yes, patient has an advance directive for healthcare treatment  --  --  Healthcare power of   Link Griffin  [phone]            Admitting Physician:  Fatimah Rivera MD  PCP: Fatimah Rivera MD    Discharging Nurse: [facility] Unit/Room#: X5T-8020/4342-27  Discharging Unit Phone Number: [phone]    Emergency Contact:   Extended Emergency Contact Information  Primary Emergency Contact: Herman Long Phone: [phone]  Work Phone: [phone]  Relation: Niece/Nephew    Past Surgical History:  Past Surgical History:   Procedure Laterality Date    ABDOMEN SURGERY      CARDIAC SURGERY      CORONARY ARTERY BYPASS GRAFT      4 grafts, approx 5 yrs ago    CYST REMOVAL      DIALYSIS FISTULA CREATION  14    EYE SURGERY      cataracts removed/leaks    OTHER SURGICAL HISTORY Left 14    LEFT RADIOCEPHALIC FISTULA    REFRACTIVE SURGERY      TONSILLECTOMY      VASCULAR SURGERY         Immunization History:   Immunization History   Administered Date(s) Administered    COVID-19, J&J, PF, 0.5 mL 2021    Influenza Vaccine, unspecified formulation 10/30/2016, 10/30/2018    Influenza Virus Vaccine 2014    Pneumococcal Conjugate 7-valent (Gina Haicatherine) 2007       Active Problems:  Patient Active Problem List   Diagnosis Code    Diabetes education, encounter for Z71.89    Mixed hyperlipidemia E78.2    Essential hypertension I10    Coronary atherosclerosis of native coronary artery I25.10    Anemia D64.9    Chronic renal insufficiency, applicable)   · Name:        Makayla Alvarez  · Address:  · Dialysis Schedule:   MWF  · Phone:    [phone]  · Fax:        [phone]    / signature: {Esignature:[id number]:::0}    PHYSICIAN SECTION    Prognosis: Fair    Condition at Discharge: Stable    Rehab Potential (if transferring to Rehab): Fair    Recommended Labs or Other Treatments After Discharge: [address] Physician Certification: I certify the above information and transfer of Page Patel  is necessary for the continuing treatment of the diagnosis listed and that she requires Home Care for {GREATER/LESS:[id number]} 30 days.      Update Admission H&P: No change in H&P    PHYSICIAN SIGNATURE:  Electronically signed by Sarah Beth Carter MD on 5/31/21 at 12:12 PM EDT

## 2021-05-31 NOTE — PROGRESS NOTES
Medication Reconciliation     List of medications patient is currently taking is complete. Source of information:   1. Conversation with patient at bedside  2. EPIC records        Notes regarding home medications:  1. Patient received all of her morning home medications today. 2. Gets HD on MWF. Midodrine only with HD    3. On warfarin for Afib (goal INR 2-3).              Current dose: 2 mg daily     Follows at [facility]      Elizabeth Mejia, Pharmacy Intern  5/30/2021 8:51 PM

## 2021-05-31 NOTE — CONSULTS
[address] [facility]                                  CONSULTATION    PATIENT NAME: Joel Oneil                       :        1940  MED REC NO:   [id number]                          ROOM:       5130  ACCOUNT NO:   [de-identified]                           ADMIT DATE: 2021  PROVIDER:     Elma Navarro MD    CARDIOLOGY CONSULTATION    CONSULT DATE:  2021    HISTORY OF PRESENT ILLNESS:  This is a pleasant 43-year-old female with  a history of end-stage renal disease, on dialysis, diabetes, aortic  valve disease, hyperlipidemia, came to the hospital with dizziness,  increasing shortness of breath, and lower extremity edema. She was  recently had stayed at [facility] and at that time was  diagnosed with non-STEMI. Due to her renal failure and her preference  at that time, she was treated with medication and Ranexa was added to  her regimen. She has been having spells where she falls down but does  not lose any consciousness. There is also a challenge trying to dialyze  her because of her low blood pressure. She has described no chest  tightness, pressure, or heaviness. REVIEW OF SYSTEMS:  Please see HPI. All other systems are reviewed and  they are negative. PAST MEDICAL HISTORY:  1. History of diabetes mellitus. 2.  Previous history of hypertension. 3.  Hyperlipidemia. 4.  Coronary artery disease. 5.  End-stage renal disease, on dialysis. SOCIAL HISTORY:  Denies any smoking or alcohol abuse. FAMILY HISTORY:  Negative for any early premature atherosclerosis. PAST SURGICAL HISTORY:  She had a previous bypass graft surgery about  five years ago, tonsillectomy, fistula creation, eye surgery. MEDICINES AND ALLERGIES:  Have been reviewed.     PHYSICAL EXAMINATION:  VITAL SIGNS:  Her pulse is 63 and regular, blood pressure is 93/54,  respirations are 18, oxygen saturation 100%.  CONSTITUTIONAL:  She is alert and oriented. HEENT:  Her neck is supple. I am unable to appreciate any jugular  venous distention. No carotid bruits appreciated. No thyromegaly. Eyes show slightly pale conjunctivae. No icterus. CARDIAC EXAMINATION:  Reveals regular rate and rhythm, had a 3-6/7  systolic murmur at the base of the heart with a very soft A2. No gallop  or rub. LUNGS:  Largely clear to auscultation and palpation. ABDOMEN:  Soft, nontender. Bowel sounds are present. EXTREMITIES:  Show no edema. NEUROLOGICAL EXAMINATION:  Alert, oriented. Cranial nerves II through  XII intact. No focal deficit. SKIN:  Shows no rashes. LABORATORY DATA:  Her sodium is 132, potassium 4.3, chloride is 92, BUN  is 17, creatinine 4.6. White count 9.8, hemoglobin 10.9, hematocrit is  32.6. Her EKG shows sinus bradycardia with diffused ST and T-wave changes in  the inferolateral leads. The troponin level is 1.06 which had gone a few days ago up to 1.71. Chest x-ray showed pulmonary edema. Echocardiogram showed EF of 40%,  moderate aortic stenosis by echocardiogram and LV dysfunction. IMPRESSION:  1. This is an 78-year-old female who has presented to the hospital with  heart failure. This is due to combination of her underlying ischemic  heart disease with troponin elevation and aortic valve disease. Certainly, a fluid removal is challenging in the presence of  hypotension. 2.  Non-ST elevation MI. The patient's troponin is significantly  elevated and in spite of end-stage renal disease, this troponin  elevation, and EKG changes are concerning for progressive  atherosclerotic heart disease. 3.  Aortic valve disease. Echocardiogram as mentioned moderate aortic  stenosis but clinically, I am suspicious that her aortic stenosis is  severe. 4.  Hypotension secondary to aortic valve disease. 5.  Diabetes mellitus. RECOMMENDATIONS:  1.   We will schedule the patient for a left and right heart  catheterization to accurately assess her coronary artery disease and  aortic valve disease. 2.  We will obtain copy of her bypass surgery which was performed five  years ago. We will make further recommendation after coronary  angiography. 3.  Benefits and risks of the procedures had been explained to the  patient and the patient is willing to proceed. We will reverse her code  status while she is undergoing coronary angiography. I appreciate the opportunity to participate in the care of this pleasant  female.         Evonne Montano MD    D: 05/31/2021 13:51:45       T: 05/31/2021 16:11:29     [email or web address]  Job#: [id number]     Doc#: [id number]    CC:

## 2021-05-31 NOTE — PROGRESS NOTES
Pharmacy has discontinued the Potassium and Magnesium protocol per hospital policy    CrCl = 8    Physician needs to order each dose of Potassium and Magnesium

## 2021-05-31 NOTE — PLAN OF CARE
Problem: Falls - Risk of:  Goal: Will remain free from falls  Description: Will remain free from falls  5/31/2021 1417 by Traci Galicia RN  Outcome: Ongoing  5/31/2021 0338 by Migdalia Medellin RN  Outcome: Ongoing  Goal: Absence of physical injury  Description: Absence of physical injury  5/31/2021 1417 by Traci Galicia RN  Outcome: Ongoing  5/31/2021 0338 by Migdalia Medellin RN  Outcome: Ongoing     Problem: Pain:  Goal: Pain level will decrease  Description: Pain level will decrease  5/31/2021 1417 by Traci Galicia RN  Outcome: Ongoing  5/31/2021 0338 by Migdalia Medellin RN  Outcome: Ongoing  Goal: Control of acute pain  Description: Control of acute pain  5/31/2021 1417 by Traci Galicia RN  Outcome: Ongoing  5/31/2021 0338 by Migdalia Medellin RN  Outcome: Ongoing  Goal: Control of chronic pain  Description: Control of chronic pain  5/31/2021 1417 by Traci Galicia RN  Outcome: Ongoing  5/31/2021 0338 by Migdalia Medellin RN  Outcome: Ongoing     Problem: Skin Integrity:  Goal: Will show no infection signs and symptoms  Description: Will show no infection signs and symptoms  5/31/2021 1417 by Traci Galicia RN  Outcome: Ongoing  5/31/2021 0338 by Migdalia Medellin RN  Outcome: Ongoing  Goal: Absence of new skin breakdown  Description: Absence of new skin breakdown  5/31/2021 1417 by Traci Galicia RN  Outcome: Ongoing  5/31/2021 0338 by Migdalia Medellin RN  Outcome: Ongoing     Problem: OXYGENATION/RESPIRATORY FUNCTION  Goal: Patient will maintain patent airway  5/31/2021 1417 by Traci Galicia RN  Outcome: Ongoing  5/31/2021 0338 by Migdalia Medellin RN  Outcome: Ongoing  Goal: Patient will achieve/maintain normal respiratory rate/effort  Description: Respiratory rate and effort will be within normal limits for the patient  5/31/2021 1417 by Traci Galicia RN  Outcome: Ongoing  5/31/2021 0338 by Migdalia Medellin RN  Outcome: Ongoing     Problem: HEMODYNAMIC STATUS  Goal: Patient has stable vital signs and fluid balance  5/31/2021 1417 by Keshawn Pineda RN  Outcome: Ongoing  5/31/2021 0338 by Maci Bashir RN  Outcome: Ongoing     Problem: FLUID AND ELECTROLYTE IMBALANCE  Goal: Fluid and electrolyte balance are achieved/maintained  5/31/2021 1417 by Keshawn Pineda RN  Outcome: Ongoing  5/31/2021 0338 by Maci Bashir RN  Outcome: Ongoing     Problem: ACTIVITY INTOLERANCE/IMPAIRED MOBILITY  Goal: Mobility/activity is maintained at optimum level for patient  5/31/2021 1417 by Keshawn Pineda RN  Outcome: Ongoing  5/31/2021 0338 by Maci Bashir RN  Outcome: Ongoing

## 2021-05-31 NOTE — PROGRESS NOTES
Renal Cons dictctaed . Pt seen and examined . CAD NSTEMI .  CHF. ESRD HD MWF. DW adjusted  . Hypotension pro amatine dose adjusted   Aortic stenosis   Plan   S/p HD  . Dw cards . Will need cardiac w/u . Dw pt in detail. Cirilo Sunshine MD, [address]

## 2021-05-31 NOTE — PROGRESS NOTES
Department of Pharmacy  Herbal Policy    [facility] recognizes that herbal/dietary supplements are used by the public and health care professionals. Herbal/dietary supplements fall under the definition of \"approaches to medical diagnosis and therapy that have not been developed by use of generally accepted methods of validating their effectiveness. \"  [facility] further recognizes that these products are not regulated by the Food and Drug Administration (FDA) as medications but instead viewed as dietary supplements. · The Department of Pharmacy does not provide herbal/dietary supplements for use. · Herbal/dietary supplements are not included on the formulary due to lack of safety and efficacy data. · Due to limited and conflicting literature on herbal/dietary supplements, pharmacy is not able to review herbal/natural products for drug-natural product interactions or disease-natural product interactions. Due to the lack of safety and efficacy data, [facility] believes the risk of adverse medication events outweighs the benefit of allowing the patient to use their home supply of herbal/natural products. Per hospital policy, the following herbal/dietary supplement(s) will be held during this hospitalization: fish oil, . If there are any questions or concerns related to the use of herbal/dietary supplements, please do not hesitate to contact the pharmacy.

## 2021-05-31 NOTE — H&P
0 [address] 16                              HISTORY AND PHYSICAL    PATIENT NAME: Opal Belcher                       :        1940  MED REC NO:   [id number]                          ROOM:       6092  ACCOUNT NO:   [de-identified]                           ADMIT DATE: 2021  PROVIDER:     Susi Mcdaniel MD    CHIEF COMPLAINT:  Could not breathe. HISTORY OF PRESENT ILLNESS:  The patient is a chronically ill  24-year-old white, single, retired female who lives in her own home and  receives assistance at home from family. About eight days ago, the  patient was admitted to [facility].  At that time, she was  diagnosed with a non-STEMI based on elevated troponin and some EKG  changes. No invasive procedures were performed at that time. She was  dialyzed during her hospitalization as per her usual and subsequently  was discharged home three days ago. Unfortunately, the patient  developed increasing shortness of breath and edema prompting transfer to  [facility] [place].  Emergency room evaluation revealed hypoxia and  chest x-ray consistent with mild pulmonary edema. Admission was felt  necessary for urgent dialysis. The patient and family agreed to  admission and she was begun on urgent dialysis with improvement in her  symptoms. The patient is known to have coronary artery disease with  remote coronary artery bypass grafting. She has had type 1 diabetes  mellitus, dependent on insulin for many years, and has been on chronic  dialysis three days a week for about the past eight or nine years. PAST MEDICAL HISTORY:  Pertinent for end-stage kidney disease requiring  dialysis, coronary artery disease, pulmonary edema, type 2 diabetes  mellitus requiring insulin, obesity, paroxysmal atrial fibrillation,  hypertension, hyperlipidemia.   She has a dialysis fistula created in her  left arm and previous other surgeries. REVIEW OF SYSTEMS:  The patient is retired, lives in her own home. Does  not smoke or consume alcohol. No illicit drugs. ALLERGIES:  Include METOPROLOL, LIDOCAINE, LISINOPRIL, BETADINE, and  LEVAQUIN. FAMILY HISTORY:  Noncontributory. PHYSICAL EXAMINATION:  GENERAL:  Following admission, the patient is a well-developed,  overweight, white female lying comfortably in bed in no acute distress,  on 2 liters of nasal oxygen. VITAL SIGNS:  Temperature 97.4, respirations 18, pulse 63, blood  pressure 93/54. SKIN:  No rash. HEENT:  Head was normal.  Mucous membranes were slightly pale. NECK:  Thyroid was normal.  Carotid pulses were 2+ without bruits. LUNGS:  A few rhonchi bilaterally in the posterior lung fields. No  rales. CARDIAC:  Distant heart sounds. Regular rate and rhythm. Grade 2  systolic ejection murmur. ABDOMEN:  Large. No mass, organomegaly or tenderness. GENITOURINARY/PELVIC/RECTAL/BREASTS:  Exams were deferred. EXTREMITIES:  No ankle or foot edema. There was 1+ dorsalis pedis  pulses. NEUROLOGICAL:  No focal weakness. There was decreased sensation in both  lower legs and feet. Station and gait could not be tested. IMPRESSION:  1. Acute pulmonary edema. 2.  Ischemic cardiomyopathy. 3.  End-stage renal disease. 4.  Hypotension. 5.  Obesity. PLAN:  The patient received urgent dialysis on the day of admission. She received her usual scheduled dialysis today, Monday. Will obtain  Nephrology consultation, Cardiac consultation. I discussed with the  patient the difficulty in keeping her at dry weight in view of her  tendencies with hypotension in spite of oral midodrine for dialysis. The patient agreed to the plan. Anticipated length of stay greater than  two midnights. Most likely, the patient will be able to return to the  home setting as an outpatient.   I had already ordered a [employer] and  wheelchair and these are pending insurance company approval.        Nieves Luke MD    D: 05/31/2021 12:17:21       T: 05/31/2021 12:22:28     [email or web address]  Job#: [id number]     Doc#: [id number]    CC:

## 2021-06-01 NOTE — PROGRESS NOTES
Shlomo Chavarria is a 78 y.o. female patient.     Current Facility-Administered Medications   Medication Dose Route Frequency Provider Last Rate Last Admin    0.9 % sodium chloride infusion   Intravenous Continuous Burke Crystal MD 50 mL/hr at 06/01/21 0618 New Bag at 06/01/21 0618    sodium chloride flush 0.9 % injection 5-40 mL  5-40 mL Intravenous 2 times per day Mainor Danielson MD        sodium chloride flush 0.9 % injection 5-40 mL  5-40 mL Intravenous PRN Burke Crystal MD        0.9 % sodium chloride infusion  25 mL Intravenous PRN Burke Crystal MD        rosuvastatin (CRESTOR) tablet 10 mg  10 mg Oral Nightly Tod Phillips MD   10 mg at 05/31/21 2049    b complex-C-folic acid (NEPHROCAPS) capsule 1 mg  1 capsule Oral Daily Selina Mccoy MD   1 mg at 06/01/21 0843    aspirin chewable tablet 81 mg  81 mg Oral Daily Tod Phillips MD   81 mg at 06/01/21 0843    vitamin D (ERGOCALCIFEROL) capsule 50,000 Units  50,000 Units Oral Q14 Days Tod Phillips MD   50,000 Units at 06/01/21 0850    cinacalcet (SENSIPAR) tablet 30 mg  30 mg Oral Every Other Day Tod Phillips MD   30 mg at 06/01/21 0843    docusate sodium (COLACE) capsule 100 mg  100 mg Oral Daily Tod Phillips MD   100 mg at 06/01/21 9157    levothyroxine (SYNTHROID) tablet 25 mcg  25 mcg Oral Daily Tod Phillips MD   25 mcg at 05/31/21 0558    pantoprazole (PROTONIX) tablet 40 mg  40 mg Oral Daily Tod Phillips MD   40 mg at 06/01/21 0844    ranolazine (RANEXA) extended release tablet 500 mg  500 mg Oral BID Tod Phillips MD   500 mg at 06/01/21 0844    sevelamer (RENVELA) tablet 800 mg  800 mg Oral TID WC Tod Phillips MD   800 mg at 06/01/21 0844    sodium chloride flush 0.9 % injection 5-40 mL  5-40 mL Intravenous 2 times per day Tod Phillips MD   10 mL at 05/31/21 2049    sodium chloride flush 0.9 % injection 5-40 mL  5-40 mL Intravenous PRN Tod Phillips MD        0.9 % sodium chloride infusion  25 mL Intravenous PRN Sarah Beth Carter MD        promethazine (PHENERGAN) tablet 12.5 mg  12.5 mg Oral Q6H PRN Sarah Beth Carter MD        Or    ondansetron [facility] injection 4 mg  4 mg Intravenous Q6H PRN Sarah Beth Carter MD        polyethylene glycol [facility]) packet 17 g  17 g Oral Daily PRN Sarah Beth Carter MD        acetaminophen (TYLENOL) tablet 650 mg  650 mg Oral Q6H PRN Sarah Beth Carter MD        Or   Sherrel Nate acetaminophen (TYLENOL) suppository 650 mg  650 mg Rectal Q6H PRN Sarah Beth Carter MD        glucose (GLUTOSE) 40 % oral gel 15 g  15 g Oral PRN Jose Cortez MD        dextrose 50 % IV solution  12.5 g Intravenous PRN Jose Cortez MD        glucagon (rDNA) injection 1 mg  1 mg Intramuscular PRN Jose Cortez MD        dextrose 5 % solution  100 mL/hr Intravenous PRN Jose Cortez MD        midodrine (PROAMATINE) tablet 10 mg  10 mg Oral TID Amado Desir MD   10 mg at 05/31/21 1742    insulin glargine (LANTUS) injection vial 10 Units  10 Units Subcutaneous Nightly Sarah Beth Carter MD   10 Units at 05/31/21 2049    insulin lispro (HUMALOG) injection vial 0-6 Units  0-6 Units Subcutaneous TID WC Sarah Beth Carter MD   1 Units at 05/31/21 1742    insulin lispro (HUMALOG) injection vial 0-3 Units  0-3 Units Subcutaneous Nightly Sarah Beth Carter MD   1 Units at 05/31/21 2049    albumin human 25 % IV solution 12.5 g  12.5 g Intravenous PRN Amado Desir MD        midodrine (PROAMATINE) tablet 10 mg  10 mg Oral PRN Amado Desir MD        warfarin (COUMADIN) daily dosing (placeholder)   Other RX Placeholder Sarah Beth Carter MD         Allergies   Allergen Reactions    Metoprolol Shortness Of Breath and Other (See Comments)     Reported by pt during admission questions \"severe shortness of breath - gasping for air and large drop in blood pressure\"    Lidocaine     Lisinopril      cough    Betadine [Povidone Iodine] Itching and Rash    Levaquin [Levofloxacin In D5w] Rash     Principal Problem:    Acute on chronic combined systolic and diastolic congestive heart failure (HCC)  Active Problems: Aortic valve stenosis    CKD (chronic kidney disease) stage V requiring chronic dialysis (HCC)    Acute pulmonary edema (HCC)  Resolved Problems:    * No resolved hospital problems. *    Blood pressure 130/63, pulse 72, temperature 97.4 °F (36.3 °C), temperature source Oral, resp. rate 17, height 5' 2\" (1.575 m), weight 218 lb 7.6 oz (99.1 kg), SpO2 95 %, not currently breastfeeding. Subjective Feels better  Objective A&O, CTA, RR&R with M, no edema. BPs ok. Labs ok. Assessment & Plan Discussed cardiac cath, etc. with patient. She understands rational for procedure.     Rafael Frye MD  6/1/2021

## 2021-06-01 NOTE — CONSULTS
History and Physical  [facility], [place], operated by [facility]   Cardiology    Chief Complaint:    HPI:     Patient is a 78 y.o. female who EP was asked to evaluate after cor angiography and attempted LV assessment for apparently acute CHB. The team was attempting to wire across the AV. The wire passed briefly but then \" chb\" per team and temp Right femoral vein electrode passed. By the time she was seen she was having intrinsic rate of ~100 bpm. She was on dopamine when arrived. Dr. Bell Bahena note: This is an 58-year-old female who has presented to the hospital with  heart failure. This is due to combination of her underlying ischemic  heart disease with troponin elevation and aortic valve disease. Certainly, a fluid removal is challenging in the presence of  hypotension. 2.  Non-ST elevation MI. The patient's troponin is significantly  elevated and in spite of end-stage renal disease, this troponin  elevation, and EKG changes are concerning for progressive  atherosclerotic heart disease. 3.  Aortic valve disease. Echocardiogram as mentioned moderate aortic  stenosis but clinically, I am suspicious that her aortic stenosis is  severe. 4.  Hypotension secondary to aortic valve disease. 5.  Diabetes mellitus. Past Medical History:   Diagnosis Date    Arthritis     Blood circulation, collateral     CAD (coronary artery disease)     CHF (congestive heart failure) (HCC)     Chronic kidney disease     Diabetes mellitus ([facility]     Hemodialysis patient ([facility]     Mon. Wed. Fri.     Hypercholesteremia     Hypertension     Other disorders of kidney and ureter     Pneumonia     pneumonia    Thyroid disease       Past Surgical History:   Procedure Laterality Date    ABDOMEN SURGERY      CARDIAC SURGERY      CORONARY ARTERY BYPASS GRAFT      4 grafts, approx 5 yrs ago    CYST REMOVAL      DIALYSIS FISTULA CREATION  12/18/14    EYE SURGERY      cataracts removed/leaks    OTHER SURGICAL HISTORY Left 12/18/14    LEFT Lisinopril      cough    Betadine [Povidone Iodine] Itching and Rash    Levaquin [Levofloxacin In D5w] Rash      Social History     Tobacco Use    Smoking status: Never Smoker    Smokeless tobacco: Never Used   Substance Use Topics    Alcohol use: No      Family History   Problem Relation Age of Onset    Heart Disease Father     High Blood Pressure Father     High Cholesterol Father     Heart Attack Father     Diabetes Maternal Uncle     Diabetes Maternal Aunt         Review of Systems:  Drowsy, responds to name    Objective Data:     BP (!) 88/43   Pulse 101   Temp 98 °F (36.7 °C) (Oral)   Resp 21   Ht 5' 2\" (1.575 m)   Wt 218 lb 7.6 oz (99.1 kg)   SpO2 100%   BMI 39.96 kg/m²     General appearance: appears stated age and morbidly obese  Alert, awake, oriented x 3  Eyes:  No erythema  Head: atraumatic  Neck: ?  JVD  Lungs: clear anteriorly  Heart: normal apical impulse, regular rate and rhythm and systolic murmur: holosystolic 2/6, crescendo at 2nd left intercostal space  Abdomen: soft, non-tender; bowel sounds normal; no masses,  no organomegaly  Extremities: extremities normal, atraumatic, no cyanosis or edema  Skin: Skin color, texture, turgor normal. No rashes or lesions  Hematologic: no remarkable bruising       ECG: normal sinus rhythm and inferior or lateral ST-T wave abnormality consistent with LVH     Data Review    Recent Labs     05/30/21  1300 05/31/21  0521 06/01/21  0557   * 132* 133*   K 6.1* 4.3 4.6   CL 92* 92* 92*   CO2 24 26 26   PHOS  --   --  3.8   BUN 23* 17 17   CREATININE 6.1* 4.6* 4.4*     Recent Labs     05/31/21  0521 06/01/21  0557 06/01/21  1312   WBC 9.8 8.7 8.3   HGB 10.9* 12.4 11.3*   HCT 32.6* 36.7 34.0*   .0* 101.5* 101.8*   * 129* 123*     Lab Results   Component Value Date    TROPONINI 1.06 05/30/2021         Assessment:     Principal Problem:    Acute on chronic combined systolic and diastolic congestive heart failure (HCC)  Active Problems: Aortic valve stenosis    CKD (chronic kidney disease) stage V requiring chronic dialysis (HCC)    Acute pulmonary edema (HCC)    CHB (complete heart block) (HCC)  Resolved Problems:    * No resolved hospital problems. *      Plan:     1. Mechanically induced av block which has improved to sinus rhythm with probable first degree av block( p waves noted post pvc) and bundle branch block. There is a decent chance that her conduction system will recover acceptably. Will monitor for now. Reviewed with RNs and Dr. Ade Lyons.

## 2021-06-01 NOTE — CARE COORDINATION
PT/OT recommending ARU. Spoke with Leonor Kahn ([id number]) with [address] [place] [address]. Confirmed patient would be an appropriate candidate for ARU if patient chooses at discharge.    MEREDITH Arteaga, AAMIRW, Social Work/Case Management   [phone]  Electronically signed by MEREDITH Arteaga, AAMIRW on 6/1/2021 at 12:34 PM

## 2021-06-01 NOTE — CONSULTS
[address] [place] [address]                                  CONSULTATION    PATIENT NAME: Paola Earl                       :        1940  MED REC NO:   [id number]                          ROOM:       1180  ACCOUNT NO:   [de-identified]                           ADMIT DATE: 2021  PROVIDER:     Omer Bradley MD    CONSULT DATE:  2021    REASON FOR CONSULTATION:  Maintenance dialysis. HISTORY OF PRESENTING ILLNESS:  An 78-year-old  female with  past medical history significant for coronary artery disease, status  post recent NSTEMI, chronic hypotension, end-stage renal disease, on  hemodialysis, Monday, Wednesday, Friday, who was recently discharged  from the [facility] after being treated for NSTEMI came to  ER complaining of chest tightness, shortness of breath and dyspnea on  exertion. Physical examination and chest x-ray was consistent with  pulmonary edema and the patient was admitted for further workup and  management. Renal consultation was called for need of emergent  dialysis. The patient required two hours of dialysis run yesterday with  some fluid removal and started to feel better. At the time of consultation, the patient is feeling and breathing  better. She had her treatment again earlier today. Denies any chest  pain and admit improvement in shortness of breath and dyspnea on  exertion. Feeling weak, tired, decreased level of energy. Denies any  fever, chills, or rigors. PAST MEDICAL HISTORY:  1. Coronary artery disease status post MI.  2.  Congestive heart failure, EF of 40%. 3.  Aortic stenosis. 4.  Hypercholesterolemia. 5.  Hypertension. 6.  Pneumonia. 7.  Hypothyroidism. 8.  End-stage renal disease on hemodialysis, Monday, Wednesday, and  Friday. 9.  Renal osteodystrophy. PAST SURGICAL HISTORY:  1.  Status post CABG.   2.  Status post left forearm AV

## 2021-06-01 NOTE — PROGRESS NOTES
Brief Nutrition Note     Pt screened per nutrition's [facility] for CHF education. Out of room during visit today, did leave written materials at bedside. Will follow-up tomorrow for verbal instruction.       Electronically signed by Jes Maradiaga RD, LD on 6/1/2021 at 12:15 PM

## 2021-06-01 NOTE — PROGRESS NOTES
Physical Therapy  Pt transfer to CVU following Cardiac Cath; please reconsult when approp to resume PT services.   Thank you, Meghana Nuñez, PT [id number]

## 2021-06-01 NOTE — PLAN OF CARE
Problem: Falls - Risk of:  Goal: Will remain free from falls  Description: Will remain free from falls  5/31/2021 2138 by Luis Samuels RN  Outcome: Ongoing  5/31/2021 1417 by Puma King RN  Outcome: Ongoing  Goal: Absence of physical injury  Description: Absence of physical injury  5/31/2021 2138 by Luis Samuels RN  Outcome: Ongoing  5/31/2021 1417 by Puma King RN  Outcome: Ongoing     Problem: Pain:  Description: Pain management should include both nonpharmacologic and pharmacologic interventions.   Goal: Pain level will decrease  Description: Pain level will decrease  5/31/2021 2138 by Luis Samuels RN  Outcome: Ongoing  5/31/2021 1417 by Puma King RN  Outcome: Ongoing  Goal: Control of acute pain  Description: Control of acute pain  5/31/2021 2138 by Luis Samuels RN  Outcome: Ongoing  5/31/2021 1417 by Puma King RN  Outcome: Ongoing  Goal: Control of chronic pain  Description: Control of chronic pain  5/31/2021 2138 by Luis Samuels RN  Outcome: Ongoing  5/31/2021 1417 by Puma King RN  Outcome: Ongoing     Problem: Skin Integrity:  Goal: Will show no infection signs and symptoms  Description: Will show no infection signs and symptoms  5/31/2021 2138 by Luis Samuels RN  Outcome: Ongoing  5/31/2021 1417 by Puma King RN  Outcome: Ongoing  Goal: Absence of new skin breakdown  Description: Absence of new skin breakdown  5/31/2021 2138 by Luis Samuels RN  Outcome: Ongoing  5/31/2021 1417 by Puma King RN  Outcome: Ongoing     Problem: OXYGENATION/RESPIRATORY FUNCTION  Goal: Patient will maintain patent airway  5/31/2021 2138 by Luis Samuels RN  Outcome: Ongoing  5/31/2021 1417 by Puma King RN  Outcome: Ongoing     Problem: HEMODYNAMIC STATUS  Goal: Patient has stable vital signs and fluid balance  5/31/2021 2138 by Luis Samuels RN  Outcome: Ongoing  5/31/2021 1417 by Puma King RN  Outcome: Ongoing     Problem: FLUID AND ELECTROLYTE IMBALANCE  Goal: Fluid and electrolyte balance are achieved/maintained  5/31/2021 2138 by Jorge Schultz RN  Outcome: Ongoing  5/31/2021 1417 by Mery Hammer RN  Outcome: Ongoing     Problem: ACTIVITY INTOLERANCE/IMPAIRED MOBILITY  Goal: Mobility/activity is maintained at optimum level for patient  5/31/2021 2138 by Jorge Schultz RN  Outcome: Ongoing  5/31/2021 1417 by Mery Hammer RN  Outcome: Ongoing

## 2021-06-01 NOTE — PROGRESS NOTES
Physical Therapy  Kaycee Adam  [id number]  [id number]    Attempted to see for PT session however pt out of room for testing/procedure; will attempt later as schedule permits  [address], [place], [facility]

## 2021-06-01 NOTE — PROGRESS NOTES
[address]  Cardiology Consult    Mara Penaloza  1940 June 1, 2021        CC: Shortness of breath      Subjective:     History of Present Illness:    Mara Penaloza is a 78 y.o. patient with a PMH significant for coronary disease, coronary bypass surgery, peripheral arterial disease diabetes, end-stage renal disease presented with complaints of shortness of breath. Mild chest discomfort but increasing shortness of breath with exertion. Lower extremity edema. No nausea vomiting diaphoresis. Past Medical History:   has a past medical history of Arthritis, Blood circulation, collateral, CAD (coronary artery disease), CHF (congestive heart failure) ([facility] [address] Chronic kidney disease, Diabetes mellitus ([facility] [address] Hemodialysis patient ([facility] [address] Hypercholesteremia, Hypertension, Other disorders of kidney and ureter, Pneumonia, and Thyroid disease. Surgical History:   has a past surgical history that includes Coronary artery bypass graft; Cardiac surgery; cyst removal; Refractive surgery; Tonsillectomy; other surgical history (Left, 12/18/14); Dialysis fistula creation (12/18/14); Abdomen surgery; eye surgery; and vascular surgery. Social History:   reports that she has never smoked. She has never used smokeless tobacco. She reports that she does not drink alcohol and does not use drugs. Family History:  family history includes Diabetes in her maternal aunt and maternal uncle; Heart Attack in her father; Heart Disease in her father; High Blood Pressure in her father; High Cholesterol in her father. Home Medications:  Were reviewed and are listed in nursing record and/or below  Prior to Admission medications    Medication Sig Start Date End Date Taking?  Authorizing Provider   Multiple Vitamins-Minerals (THERAPEUTIC MULTIVITAMIN-MINERALS) tablet Take 1 tablet by mouth daily   Yes Historical Provider, MD   meclizine (ANTIVERT) 12.5 MG tablet Take 12.5 mg by mouth 4 times daily as needed for Dizziness 3 times daily. 12/10/10   Historical Provider, MD        CURRENT Medications:  perflutren lipid microspheres (DEFINITY) injection,   0.9 % sodium chloride infusion, Continuous  sodium chloride flush 0.9 % injection 5-40 mL, 2 times per day  sodium chloride flush 0.9 % injection 5-40 mL, PRN  0.9 % sodium chloride infusion, PRN  acetaminophen (TYLENOL) tablet 650 mg, Q4H PRN  ondansetron (ZOFRAN) injection 4 mg, Q6H PRN  perflutren lipid microspheres (DEFINITY) injection 1.65 mg, ONCE PRN  DOPamine (INTROPIN) 400 mg in dextrose 5 % 250 mL infusion, Continuous  0.9 % sodium chloride bolus, Once  rosuvastatin (CRESTOR) tablet 10 mg, Nightly  b complex-C-folic acid (NEPHROCAPS) capsule 1 mg, Daily  aspirin chewable tablet 81 mg, Daily  vitamin D (ERGOCALCIFEROL) capsule 50,000 Units, Q14 Days  cinacalcet (SENSIPAR) tablet 30 mg, Every Other Day  docusate sodium (COLACE) capsule 100 mg, Daily  levothyroxine (SYNTHROID) tablet 25 mcg, Daily  pantoprazole (PROTONIX) tablet 40 mg, Daily  sevelamer (RENVELA) tablet 800 mg, TID WC  promethazine (PHENERGAN) tablet 12.5 mg, Q6H PRN   Or  ondansetron (ZOFRAN) injection 4 mg, Q6H PRN  polyethylene glycol (GLYCOLAX) packet 17 g, Daily PRN  acetaminophen (TYLENOL) tablet 650 mg, Q6H PRN   Or  acetaminophen (TYLENOL) suppository 650 mg, Q6H PRN  glucose (GLUTOSE) 40 % oral gel 15 g, PRN  dextrose 50 % IV solution, PRN  glucagon (rDNA) injection 1 mg, PRN  dextrose 5 % solution, PRN  midodrine (PROAMATINE) tablet 10 mg, TID  insulin glargine (LANTUS) injection vial 10 Units, Nightly  insulin lispro (HUMALOG) injection vial 0-6 Units, TID WC  insulin lispro (HUMALOG) injection vial 0-3 Units, Nightly  albumin human 25 % IV solution 12.5 g, PRN  midodrine (PROAMATINE) tablet 10 mg, PRN        Allergies:  Metoprolol, Lidocaine, Lisinopril, Betadine [povidone iodine], and Levaquin [levofloxacin in d5w]           Review of Systems: SEE HPI   · Constitutional: no unanticipated weight loss. unlabored. No wheeze, rales   Chest Wall:  No tenderness or deformity. Cardiovascular:    Pulses  Palpation: normal   Ascultation: Regular rate, S1/ S2 normal. No murmur, rub, or gallop. 2+ radial and pedal pulses, symmetric  Carotid  Femoral   Abdomen and Gastrointestinal:   Soft, non-tender, bowel sounds active. Liver and Spleen  Masses   Musculoskeletal: No muscle wasting  Back  Gait   Extremities: Extremities normal, atraumatic. No cyanosis or edema. No cyanosis clubbing       Skin: Inspection and palpation performed, no rashes or lesions. Pysch: Normal mood and affect. Alert and oriented to time place person   Neurologic: Normal gross motor and sensory exam.       Labs     All labs have been reviewed    Lab Results   Component Value Date    WBC 8.3 06/01/2021    RBC 3.34 06/01/2021    HGB 11.3 06/01/2021    HCT 34.0 06/01/2021    .8 06/01/2021    RDW 15.4 06/01/2021     06/01/2021     Lab Results   Component Value Date     06/01/2021    K 4.6 06/01/2021    K 4.3 05/31/2021    CL 92 06/01/2021    CO2 26 06/01/2021    BUN 17 06/01/2021    CREATININE 4.4 06/01/2021    GFRAA 12 06/01/2021    GFRAA 23 05/15/2013    AGRATIO 1.4 05/30/2021    LABGLOM 10 06/01/2021    GLUCOSE 156 06/01/2021    PROT 6.4 06/01/2021    PROT 6.8 11/06/2012    CALCIUM 9.4 06/01/2021    BILITOT 0.7 06/01/2021    ALKPHOS 130 06/01/2021    AST 15 06/01/2021    ALT 19 06/01/2021     No results found for: PTINR  Lab Results   Component Value Date    LABA1C 8.2 01/14/2021     Lab Results   Component Value Date    TROPONINI 1.06 ([facility]) 05/30/2021       Cardiac, Vascular and Imaging Data all Personally Reviewed in Detail by Myself      EKG: Normal sinus rhythm with lateral ST inversion suggestive of ischemia    Echocardiogram:  Summary   -Normal left ventricle size and systolic function with an estimated ejection   fraction of 55-60%. -No regional wall motion abnormalities are seen.    -There is moderate concentric left ventricular hypertrophy. -Grade II diastolic dysfunction with elevated LV filling   pressures. E/e\"=24.8.   -Mitral annular calcification is present.   -Moderate mitral regurgitation.   -Mild aortic stenosis . -Trivial pulmonic regurgitation present.   -Moderate to severe tricuspid regurgitation with RVSP of 65 mmHg. -The right ventricle is moderately enlarged.   -Right ventricular systolic function is moderately reduced .   -The right atrium is moderately dilated. Chest x-ray shows mild pulmonary edema    Assessment and Plan     -Non-ST elevation myocardial infarction. Possible reason for ongoing symptoms increasing shortness of breath NSTEMI. Patient has prior history of coronary disease, coronary bypass surgery. Will need coronary angiography to define coronary anatomy.    -Last echocardiogram reports moderate aortic valve stenosis which I suspect could be moderately severe. Plan to cross aortic valve and assess aortic valve gradient.    -Systolic acute congestive heart failure secondary to coronary disease and valvular pathology and reduced ejection fraction. Guideline based therapy cannot be initiated secondary to hypotension.    -Baseline hypotension on midodrine for blood pressure support.    -End-stage renal disease on dialysis. I had a detail discussion with the patient and the family members regarding the risk and benefit of the procedures. I explained to them the details of the procedure. I explained to them that the procedure will be performed using moderate sedation and local anaesthesia using lidocaine. I explained any risk of bleeding, perforation of the vessel, stroke, myocardial infarction or death. Also explained to the patient need for any emergent open heart surgery and CABG to save the patient's life. I have presented reasonable alternatives to the patient's proposed care, treatment, and services.  The discussion I have done encompassed risks, benefits, and side effects related to the alternatives and the risks related to not receiving the proposed care, treatment, and services. The patient and the family members understood the risk and benefits and the details of procedure and would like to go ahead with the procedure. The patient gave informed consent. Thank you for allowing us to participate in the care of Vince Holland. Please do not hesitate to contact me if you have any questions.     Laura Varela MD, MPH    [address] [place] Gambino [facility]  Ph: [phone]  Fax: [phone]    6/1/2021 1:48 PM

## 2021-06-01 NOTE — PROGRESS NOTES
Nephrology ([facility]) Progress Note    CC: ESRD management    Subjective:    HPI:  Breathing stable. No CP.    ROS:  In bed. [address]:  medications reviewed. Objective:  Blood pressure 122/76, pulse 60, temperature 98 °F (36.7 °C), temperature source Oral, resp. rate 16, height 5' 2\" (1.575 m), weight 218 lb 7.6 oz (99.1 kg), SpO2 95 %, not currently breastfeeding. Intake/Output Summary (Last 24 hours) at 6/1/2021 1227  Last data filed at 6/1/2021 0600  Gross per 24 hour   Intake 660 ml   Output 0 ml   Net 660 ml     General:  NAD, A+OX3  Chest:  CTAB  CVS:  RRR  Abdominal:  NTND, soft, +BS  Extremities:  no edema  Skin:  no rash    Labs:  Renal panel:  Lab Results   Component Value Date/Time     (L) 06/01/2021 05:57 AM    K 4.6 06/01/2021 05:57 AM    K 4.3 05/31/2021 05:21 AM    CO2 26 06/01/2021 05:57 AM    BUN 17 06/01/2021 05:57 AM    CREATININE 4.4 (H) 06/01/2021 05:57 AM    CALCIUM 9.4 06/01/2021 05:57 AM    PHOS 3.8 06/01/2021 05:57 AM    MG 1.90 12/17/2018 04:27 AM     CBC:  Lab Results   Component Value Date/Time    WBC 8.7 06/01/2021 05:57 AM    HGB 12.4 06/01/2021 05:57 AM    HCT 36.7 06/01/2021 05:57 AM     (L) 06/01/2021 05:57 AM       Assessment/Plan:  Reviewed old records and labs.     1) ESRD  - HD MWF    2) hypotension  - on proamatine    3) NSTEMI  - cardiac catheterization today  - assess aortic valve

## 2021-06-01 NOTE — PROGRESS NOTES
Report and bedside handoff with [facility]. Patient awake, but not verbal in speech, only occasionally a moan or slight noise. Will follow commands to upper extremities only at initial assessment. As the night goes on patient progresses into obeying commands with lower extremities and by 7am patient is able to speak clearly and is alert and oriented to self, place, and situation, but not time. She is laying flat to due sheaths in R groin, sheaths are unable to draw blood, patient switched to lab stick. Report given to [facility].

## 2021-06-01 NOTE — PROCEDURES
[address]                            CARDIAC CATHETERIZATION    PATIENT NAME: Miguelangel Dunn                       :        1940  MED REC NO:   [id number]                          ROOM:       0549  ACCOUNT NO:   [de-identified]                           ADMIT DATE: 2021  PROVIDER:     Meghan Ann MD    DATE OF PROCEDURE:  2021    PROCEDURE PERFORMED:  1. Left heart catheterization with graft angiography. 2.  Insertion of transvenous pacemaker. INDICATION FOR PROCEDURE:  Non-ST-elevation myocardial infarction,  cardiomyopathy, and complete heart block. Informed consent obtained. ASA is II. Mallampati score II. PROCEDURE IN DETAIL:  Right groin was prepped and draped in sterile  fashion. We accessed the right common femoral artery. We inserted a  5-[place] sheath. Right radial was prepped and draped in sterile  fashion. We accessed the right radial artery. We inserted a slender  sheath. Then, we inserted a JL4 diagnostic catheter and used to select  and engage the left main coronary artery ostium. We performed  angiography of the left system. Rust Lizz was removed. Rommie Hare was used to  select and engage the right coronary artery. We performed angiography  of the right system. Then, it was used to engage the saphenous vein  graft to OM3 and perform selective angiography. Then, it was used to  engage the saphenous vein graft to right PDA. We performed angiography  of the saphenous graft to right PDA. The catheter was switched out for  an internal mammary artery. We used an MATEUSZ catheter and selected the  left internal mammary artery with the catheter. We performed  angiography of the LIMA to the LAD. Then, we performed selective angiography of the left subclavian artery.    After that, the catheter was used to cross in the LV, the MATEUSZ, but the  patient went into complete heart block.  The catheter was removed. Transvenous pacemaker was placed after accessing the right common  femoral vein. Pacemaker wire was situated in the right ventricle. Pacemaker started at 80 per minute. The patient was transferred to CVU  in stable condition. Estimated blood loss less than 30 mL. ANGIOGRAPHY FINDINGS:  1. Left main patent with occluded left anterior descending artery. 2.  Left circumflex mid to distal portion has 80% to 90% stenosis,  moderately calcified. 3.  Right coronary artery occluded. 4.  Saphenous vein graft to right posterior descending artery is patent. 5.  Saphenous vein graft to OM3 is patent. 6.  Left internal mammary artery to left anterior descending artery  patent. 7.  Proximal left subclavian artery has 80% stenosis with a 30-mm  pressure gradient across the lesion. 8.  Successful placement of transvenous pacemaker. SUMMARY:  1. Severe left circumflex artery disease, possibly the culprit for  NSTEMI. 2.  Aortic valve could not be studied. 3.  Severe left subclavian artery stenosis with flow impacted in the LAD  territory of the left internal mammary artery. 4.  Transvenous pacemaker placement. RECOMMENDATION:  1. Continue postop post cath care. 2.  Site care. 3.  The patient will be transferred to CVU. Consult Electrophysiology  for permanent pacemaker placement.         Codie Anderson MD    D: 06/01/2021 12:10:18       T: 06/01/2021 13:59:22     [id number]  Job#: [id number]     Doc#: [id number]    CC:

## 2021-06-01 NOTE — PROGRESS NOTES
@3861- pt to [facility] from cath lab s/p     @1249- Dr. Emy Parker at bedside. Pt's SBP 70s; Dr. Emy Parker stated pt was hypotensive upon arrival to cath lab; okay for SBP to be 70s. Give 500 mL 0.9% NS bolus x1 now. Pt being 100% V-paced. Right groin TVP with back-up rate 80 bpm, mA 8.0, sensitivity 1.0.     @1306- Dr. Clifford Friday with EP at bedside. ECHO tech at bedside. @7694- labs drawn and sent. @5323- pt stopped pacing; rate 101, SBP 90s    @1400- pt squirming, pulling at lines. Soft wrist restraint placed to right wrist.    @1515- last 3 mL removed from TR band. @1600- TR band removed. Site c/d/i. No hematoma formation or oozing noted. Bruising scattered to arm. Radial pulse +2.     [id number] Dr. Emy Parker stated dopamine could be slowly weaned down if pt's HR tolerates it. Rate changed to 2.5 mcg/kg/min    @1940- hand-off report given to BELLE Dove End of shift checks completed at bedside.     Electronically signed by Urvashi Whitmore RN on 6/1/2021 at 7:58 PM

## 2021-06-01 NOTE — CONSULTS
[facility]  Palliative Care   Consult Note    NAME:  Page Patel  MEDICAL RECORD NUMBER:  [id number]  AGE: 78 y.o. GENDER: female  : 1940  TODAY'S DATE:  2021    Subjective     Reason for Consult:  goals of care and code status, family support   Visit Type: Initial Consult      Page Patel is a 78 y.o. female referred by:  [x] Physician    PAST MEDICAL HISTORY      Diagnosis Date    Arthritis     Blood circulation, collateral     CAD (coronary artery disease)     [facility]     Chronic kidney disease     Diabetes mellitus ([facility]     [facility]     Mon. Wed. Fri.     Hypercholesteremia     Hypertension     Other disorders of kidney and ureter     Pneumonia     pneumonia    Thyroid disease        PAST SURGICAL HISTORY  Past Surgical History:   Procedure Laterality Date    ABDOMEN SURGERY      CARDIAC SURGERY      CORONARY ARTERY BYPASS GRAFT      4 grafts, approx 5 yrs ago    CYST REMOVAL      DIALYSIS FISTULA CREATION  14    EYE SURGERY      cataracts removed/leaks    OTHER SURGICAL HISTORY Left 14    LEFT RADIOCEPHALIC FISTULA    REFRACTIVE SURGERY      TONSILLECTOMY      VASCULAR SURGERY         FAMILY HISTORY  Family History   Problem Relation Age of Onset    Heart Disease Father     High Blood Pressure Father     High Cholesterol Father     Heart Attack Father     Diabetes Maternal Uncle     Diabetes Maternal Aunt        SOCIAL HISTORY  Social History     Tobacco Use    Smoking status: Never Smoker    Smokeless tobacco: Never Used   Vaping Use    Vaping Use: Never used   Substance Use Topics    Alcohol use: No    Drug use: No       ALLERGIES  Allergies   Allergen Reactions    Metoprolol Shortness Of Breath and Other (See Comments)     Reported by pt during admission questions \"severe shortness of breath - gasping for air and large drop in blood pressure\"    Lidocaine     Lisinopril      cough mouth 3 times daily. Objective         BP (!) 88/43   Pulse 101   Temp 98 °F (36.7 °C) (Oral)   Resp 21   Ht 5' 2\" (1.575 m)   Wt 218 lb 7.6 oz (99.1 kg)   SpO2 100%   BMI 39.96 kg/m²     Code Status: Per her niece she is DNR CCA do not intubate     Advanced Directives: not completed only has niece as family to make decisions, Navdeep Clya [phone]    Assessment        Management and Education    Persons available for education:        [] Self     [] Caregiver       [] Spouse       [x] Other Family Member   []  Other    Spiritual History:  notified: Yes,     Does the patient have a Primary Care Physician? Yes     Level of patient/caregiver understanding able to:        [x] Verbalize Understanding   [] Demonstrate Understanding       [] Teach Back       [] Needs Reinforcement     []  Other:      Teaching Time:  1hours  0 minutes     Plan        Social Service Consult Made:  Yes  Assistance filling out Living Will/HPOA:  No      Discharge Plan:  Education/support to family  Providing support for coping/adaptation/distress of family  Code status clarified: [facility]  Palliative care orders introduced    Discharge Environment:  [] Hospice Consult Agency:  [] Inpatient Hospice    [] Home with Hospice Care   [] ECF with Hospice  [] ECF skilled care with Hospice to follow   [] Other:    Discussion: Patient admitted for shortness of breath, she was previously at [facility] for NSTEMI and discharged home. She spoke with Dr Kaitlin Schmitz yesterday agreed to angiogram and changing code status during procedure only. After procedure patient is unable to speak to me does not follow commands, restless in bed. I have called her HPOA/niece Navdeep Clay to discuss her aunt. Ms Liz Ross states her aunt lives alone is able to care for herself but has been declining as of late. Ms Liz Ross did state her aunt did not want to be on ventilator or have chest compressions/shocking if her heart stops or she stops breathing. She will be here this afternoon. I did call Dr Percy Mcclure to confirm his patient's wishes he agrees she would want to be DNR and also agrees she has been declining as of late. [facility] bedside nurse informed of above. I will continue to follow Ms. Miranda's care as needed. Thank you for allowing me to participate in the care of Ms. Miranda .      Electronically signed by Enrique Starks RN, [facility] on 6/1/2021 at 2:02 PM  [address] [place] [address]  Office: [phone]

## 2021-06-01 NOTE — PROGRESS NOTES
Occupational Therapy   Occupational Therapy Initial Assessment and Tentative D/C    Date: 2021   Patient Name: Shlomo Chavarria  MRN: [id number]     : 1940    Date of Service: 2021    Discharge Recommendations: Shlomo Chavarria scored a 17/24 on the AM-PAC ADL Inpatient form. Current research shows that an AM-PAC score of 17 or less is typically not associated with a discharge to the patient's home setting. Based on the patient's AM-PAC score and their current ADL deficits, it is recommended that the patient have 5-7 sessions per week of Occupational Therapy at d/c to increase the patient's independence. At this time, this patient demonstrates the endurance, and/or tolerance for 3 hours of therapy each day, with a treatment frequency of 5-7x/wk. Please see assessment section for further patient specific details. If patient discharges prior to next session this note will serve as a discharge summary. Please see below for the latest assessment towards goals. Continue to assess pending progress, 5-7 sessions per week  OT Equipment Recommendations  Equipment Needed: No    Assessment   Performance deficits / Impairments: Decreased functional mobility ; Decreased strength;Decreased endurance;Decreased ADL status; Decreased balance;Decreased high-level IADLs  Assessment: Shlomo Chavarria is a 78 y.o. female who presents to the emergency department for evaluation of generalized weakness and lethargy as well as bilateral foot swelling. Patient was just admitted to [facility] for treatment of an NSTEMI. She had presented with a gradually progressive shortness of breath was found to have an elevated troponin. Patient did have an echocardiogram during that admission which showed an EF of 40%. She was started on Ranexa at that time as well. Patient does have a history of soft blood pressure, and takes midodrine with her dialysis treatment. She states she is never taken midodrine outside of dialysis.  Her last dialysis was Friday before leaving the hospital and was normal treatment. She states she has been doing well at home until this morning when she was too weak to get out of bed. Her daughter thought that she was responding more slowly to questions. She also was noted to have bilateral foot swelling which is a new issue. Patient denies any chest pain. She states that she has had continued shortness of breath since leaving the hospital. She denies any fever, chills, abdominal pain, urinary symptoms. PTA pt from home with family where pt was Ind with mobility and ADLs with use of 4WW. Pt currently functioning below baseline completing mobility and transfers with CGA and use of RW. Pt able to ambulate short household distances in room although with increased fatigue. Anticipate pt needing up to Min/Mod A for ADLs. Pt will benefit from skilled OT services at this time time. Anticipate pt with need for ongoing OT needs at time of D/C. Prognosis: Good  Decision Making: Medium Complexity  Exam: see above  Assistance / Modification: RW  OT Education: OT Role;Plan of Care;Transfer Training  REQUIRES OT FOLLOW UP: Yes  Activity Tolerance  Activity Tolerance: Patient Tolerated treatment well;Patient limited by fatigue  Safety Devices  Safety Devices in place: Yes  Type of devices: Left in chair;Chair alarm in place;Call light within reach;Gait belt;Nurse notified           Patient Diagnosis(es): The primary encounter diagnosis was Acute pulmonary edema ([address] Diagnoses of Hyperkalemia and Acute on chronic clinical systolic heart failure ([address] were also pertinent to this visit. has a past medical history of Arthritis, Blood circulation, collateral, CAD (coronary artery disease), CHF (congestive heart failure) ([address] Chronic kidney disease, Diabetes mellitus ([address] Hemodialysis patient ([address] Hypercholesteremia, Hypertension, Other disorders of kidney and ureter, Pneumonia, and Thyroid disease.    has a past surgical history that includes Coronary artery bypass graft; Cardiac surgery; cyst removal; Refractive surgery; Tonsillectomy; other surgical history (Left, 12/18/14); Dialysis fistula creation (12/18/14); Abdomen surgery; eye surgery; and vascular surgery. Restrictions  Restrictions/Precautions  Restrictions/Precautions: Fall Risk  Position Activity Restriction  Other position/activity restrictions: dialysis MWF    Subjective   General  Chart Reviewed: Yes  Patient assessed for rehabilitation services?: Yes  Additional Pertinent Hx: per ED note, Arian Pearson is a 78 y.o. female who presents to the emergency department for evaluation of generalized weakness and lethargy as well as bilateral foot swelling. Patient was just admitted to [facility] for treatment of an NSTEMI. She had presented with a gradually progressive shortness of breath was found to have an elevated troponin. Patient did have an echocardiogram during that admission which showed an EF of 40%. She was started on Ranexa at that time as well. Patient does have a history of soft blood pressure, and takes midodrine with her dialysis treatment. She states she is never taken midodrine outside of dialysis. Her last dialysis was Friday before leaving the hospital and was normal treatment. She states she has been doing well at home until this morning when she was too weak to get out of bed. Her daughter thought that she was responding more slowly to questions. She also was noted to have bilateral foot swelling which is a new issue. Patient denies any chest pain. She states that she has had continued shortness of breath since leaving the hospital. She denies any fever, chills, abdominal pain, urinary symptoms. Family / Caregiver Present: No  Referring Practitioner: Fatimah Rivera MD  Subjective  Subjective: Pt agreeable to OT evaluation. Pt reports no pain. General Comment  Comments: okay for therapy per RN.      Social/Functional History  Social/Functional History  Lives With:  (Niece)  Type of Home: House  Home Layout: Two level, Bed/Bath upstairs (stair lift to 2nd floor)  Home Access: Ramped entrance  Bathroom Shower/Tub: Walk-in shower, Shower chair with back  Bathroom Toilet: Standard  Bathroom Equipment: Shower chair  Home Equipment: 4 wheeled walker (2 rollators one upstairs and one on main level)  Receives Help From: Family  ADL Assistance: Independent  Ambulation Assistance: Independent  Transfer Assistance: Independent  Active : No  Patient's  Info: Family/niece drives pt to appointment  Occupation: Retired  Type of occupation:   Additional Comments: pt goes to grocery store with her niece every Thurs; reports she has had 3 falls recently due to weakness       Objective   Vision: Impaired  Vision Exceptions: Wears glasses at all times  Hearing: Within functional limits    Orientation  Overall Orientation Status: Within Functional Limits  Orientation Level: Oriented X4     Balance  Sitting Balance: Supervision  Standing Balance: Contact guard assistance (RW)  Functional Mobility  Functional - Mobility Device: Rolling Walker  Activity: Other (household distances in room; ~20ft x2)  Assist Level: Contact guard assistance  Functional Mobility Comments: no LOB; increased time to complete; noted fatigue after ambulating short distances in room  Wheelchair Bed Transfers  Wheelchair/Bed - Technique: Ambulating (RW)  Equipment Used: Bed;Other (bed to chair)  Level of Asssistance: Contact guard assistance  ADL  Additional Comments: Anticipate pt needing up to Min/Mod A for ADLs including dressing, bathing, and toileting based on ROM, strength, balance, and endurance  Tone RUE  RUE Tone: Normotonic  Tone LUE  LUE Tone: Normotonic  Coordination  Movements Are Fluid And Coordinated: Yes     Bed mobility  Supine to Sit: Stand by assistance (HOB elevated)  Comment: up in chair after session  Transfers  Sit to stand: Contact guard assistance  Stand to sit: Contact guard assistance  Transfer Comments: to/from RW; cues for hand placement     Cognition  Overall Cognitive Status: WFL                 LUE AROM (degrees)  LUE AROM : WFL  RUE AROM (degrees)  RUE AROM : WFL  LUE Strength  Gross LUE Strength: WFL  RUE Strength  Gross RUE Strength: WFL                   Plan   Plan  Times per week: 3-5x  Current Treatment Recommendations: Strengthening, Endurance Training, Balance Training, Functional Mobility Training, Safety Education & Training, Self-Care / ADL, Patient/Caregiver Education & Training      AM-PAC Score        AM-[facility] Inpatient Daily Activity Raw Score: 17 (06/01/21 0746)  AM-PAC Inpatient ADL T-Scale Score : 37.26 (06/01/21 0746)  ADL Inpatient CMS 0-100% Score: 50.11 (06/01/21 0746)  ADL Inpatient CMS G-Code Modifier : CK (06/01/21 0746)    Goals  Short term goals  Time Frame for Short term goals: prior to D/C  Short term goal 1: complete functional mobility and transfers Mod Ind  Short term goal 2: complete bathing and dressing Mod Ind  Short term goal 3: complete toileting Mod Ind  Short term goal 4: complete grooming in stance at sink [address] term goals  Time Frame for Long term goals : STG=LTG  Patient Goals   Patient goals : increase indepenedence       Therapy Time   Individual Concurrent Group Co-treatment   Time In 0718         Time Out 0743         Minutes 25         Timed Code Treatment Minutes: 10 Minutes (15 minute eval)       CATRCAHITA Ibrahim/L

## 2021-06-01 NOTE — PLAN OF CARE
Problem: Falls - Risk of:  Goal: Will remain free from falls  Description: Will remain free from falls  6/1/2021 1131 by Naresh Ward RN  Outcome: Ongoing     Problem: Skin Integrity:  Goal: Will show no infection signs and symptoms  Description: Will show no infection signs and symptoms  6/1/2021 1131 by Naresh Ward RN  Outcome: Ongoing     Problem: OXYGENATION/RESPIRATORY FUNCTION  Goal: Patient will maintain patent airway  6/1/2021 1131 by Naresh Ward RN  Outcome: Ongoing     Problem: HEMODYNAMIC STATUS  Goal: Patient has stable vital signs and fluid balance  6/1/2021 1131 by Naresh Ward RN  Outcome: Ongoing     Problem: FLUID AND ELECTROLYTE IMBALANCE  Goal: Fluid and electrolyte balance are achieved/maintained  6/1/2021 1131 by Naresh Ward RN  Outcome: Ongoing     Problem: ACTIVITY INTOLERANCE/IMPAIRED MOBILITY  Goal: Mobility/activity is maintained at optimum level for patient  6/1/2021 1131 by Naresh Ward RN  Outcome: Ongoing

## 2021-06-02 NOTE — PROGRESS NOTES
Gerhard Pallas is a 78 y.o. female patient.     Current Facility-Administered Medications   Medication Dose Route Frequency Provider Last Rate Last Admin    0.9 % sodium chloride infusion   Intravenous Continuous Burke Gladys Byers MD 50 mL/hr at 06/01/21 1300 Rate Verify at 06/01/21 1300    sodium chloride flush 0.9 % injection 5-40 mL  5-40 mL Intravenous 2 times per day Owen Hanna MD   10 mL at 06/02/21 0913    sodium chloride flush 0.9 % injection 5-40 mL  5-40 mL Intravenous PRN Owen Hanna MD        0.9 % sodium chloride infusion  25 mL Intravenous PRN Owen Hanna MD        acetaminophen (TYLENOL) tablet 650 mg  650 mg Oral Q4H PRN Owen Hanna MD        [facility]ans[facility]ron [facility] injection 4 mg  4 mg Intravenous Q6H PRN Owen Hanna MD        perflutren lipid microspheres (DEFINITY) injection 1.65 mg  1.5 mL Intravenous ONCE PRN Owen Hanna MD        DOPamine (INTROPIN) 400 mg in dextrose 5 % 250 mL infusion  5 mcg/kg/min Intravenous Continuous Owen Hanna MD 9.3 mL/hr at 06/02/21 0856 2.5 mcg/kg/min at 06/02/21 0856    0.9 % sodium chloride bolus  500 mL Intravenous Once Owen Hanna MD        rosuvastatin (CRESTOR) tablet 10 mg  10 mg Oral Nightly Eva Farris MD   10 mg at 05/31/21 2049    b complex-C-folic acid (NEPHROCAPS) capsule 1 mg  1 capsule Oral Daily Quita Gifford MD   1 mg at 06/01/21 0843    aspirin chewable tablet 81 mg  81 mg Oral Daily Eva Farris MD   81 mg at 06/02/21 0900    vitamin D (ERGOCALCIFEROL) capsule 50,000 Units  50,000 Units Oral Q14 Days Eva Farris MD   50,000 Units at 06/01/21 0850    cinacalcet (SENSIPAR) tablet 30 mg  30 mg Oral Every Other Day Eva Farris MD   30 mg at 06/02/21 0900    docusate sodium (COLACE) capsule 100 mg  100 mg Oral Daily Eva Farris MD   100 mg at 06/02/21 0900    levothyroxine (SYNTHROID) tablet 25 mcg  25 mcg Oral Daily Eva Farris MD   25 mcg at 06/02/21 0900    pantoprazole (PROTONIX) tablet 40 mg 40 mg Oral Daily Ava Barfield MD   40 mg at 06/02/21 0900    sevelamer (RENVELA) tablet 800 mg  800 mg Oral TID  Ava Barfield MD   800 mg at 06/01/21 0844    promethazine (PHENERGAN) tablet 12.5 mg  12.5 mg Oral Q6H PRN Ava Barfield MD        Or    ondansetron [facility]) injection 4 mg  4 mg Intravenous Q6H PRN Ava Barfield MD        polyethylene glycol [facility]) packet 17 g  17 g Oral Daily PRN Ava Barfield MD        acetaminophen (TYLENOL) tablet 650 mg  650 mg Oral Q6H PRN Ava Barfield MD        Or   Jimmy Garduno acetaminophen (TYLENOL) suppository 650 mg  650 mg Rectal Q6H PRN Ava Barfield MD        glucose (GLUTOSE) 40 % oral gel 15 g  15 g Oral PRN Bethany Son MD        dextrose 50 % IV solution  12.5 g Intravenous PRN Bethany Son MD        glucagon (rDNA) injection 1 mg  1 mg Intramuscular PRN Bethany Son MD        dextrose 5 % solution  100 mL/hr Intravenous PRN Bethany Son MD        midodrine (PROAMATINE) tablet 10 mg  10 mg Oral TID Maria De Jesus Christy MD   10 mg at 06/02/21 0900    insulin glargine (LANTUS) injection vial 10 Units  10 Units Subcutaneous Nightly Ava Barfield MD   10 Units at 06/01/21 2225    insulin lispro (HUMALOG) injection vial 0-6 Units  0-6 Units Subcutaneous TID  Ava Barfield MD   1 Units at 05/31/21 1742    insulin lispro (HUMALOG) injection vial 0-3 Units  0-3 Units Subcutaneous Nightly Ava Barfield MD   1 Units at 06/01/21 2225    albumin human 25 % IV solution 12.5 g  12.5 g Intravenous PRN Maria De Jesus Christy MD        midodrine (PROAMATINE) tablet 10 mg  10 mg Oral PRN Maria De Jesus Christy MD         Allergies   Allergen Reactions    Metoprolol Shortness Of Breath and Other (See Comments)     Reported by pt during admission questions \"severe shortness of breath - gasping for air and large drop in blood pressure\"    Lidocaine     Lisinopril      cough    Betadine [Povidone Iodine] Itching and

## 2021-06-02 NOTE — PLAN OF CARE
Ongoing  Note: A: Strict I/O, daily weight, assess for edema. Educate patient and family. Review lab results. Notify physician of abnormal results. Hemodialysis per nephrology orders       Problem: Fluid Volume:  Goal: Will show no signs or symptoms of fluid imbalance  Description: Will show no signs or symptoms of fluid imbalance  Outcome: Ongoing  Note: A: Hemodialysis per nephrology orders. Strict I/O, daily weights, assess for edema or shortness of breath     Problem: Cardiac Output - Decreased:  Goal: Hemodynamic stability will improve  Description: Hemodynamic stability will improve  Outcome: Ongoing  Note: A: Continuous cardiac telemetry monitor. VS per routine. Administer treatments and medications as ordered. Monitor patient's weight. Repeat 12 EKGs as ordered for rhythm changes or chest pain. Notify physician of hemodynamic or rhythm changes.

## 2021-06-02 NOTE — PROGRESS NOTES
[address]  Cardiology Consult    Jennifer Bain  1940 June 2, 2021        CC: Shortness of breath      Subjective:     History of Present Illness:    Confused   Awake but confused. Jennifer Bain is a 78 y.o. patient with a PMH significant for coronary disease, coronary bypass surgery, peripheral arterial disease diabetes, end-stage renal disease presented with complaints of shortness of breath. Mild chest discomfort but increasing shortness of breath with exertion. Lower extremity edema. No nausea vomiting diaphoresis. Past Medical History:   has a past medical history of Arthritis, Blood circulation, collateral, CAD (coronary artery disease), CHF (congestive heart failure) ([facility] Chronic kidney disease, Diabetes mellitus ([facility] Hemodialysis patient ([facility] Hypercholesteremia, Hypertension, Other disorders of kidney and ureter, Pneumonia, and Thyroid disease. Surgical History:   has a past surgical history that includes Coronary artery bypass graft; Cardiac surgery; cyst removal; Refractive surgery; Tonsillectomy; other surgical history (Left, 12/18/14); Dialysis fistula creation (12/18/14); Abdomen surgery; eye surgery; and vascular surgery. Social History:   reports that she has never smoked. She has never used smokeless tobacco. She reports that she does not drink alcohol and does not use drugs. Family History:  family history includes Diabetes in her maternal aunt and maternal uncle; Heart Attack in her father; Heart Disease in her father; High Blood Pressure in her father; High Cholesterol in her father. Home Medications:  Were reviewed and are listed in nursing record and/or below  Prior to Admission medications    Medication Sig Start Date End Date Taking?  Authorizing Provider   Multiple Vitamins-Minerals (THERAPEUTIC MULTIVITAMIN-MINERALS) tablet Take 1 tablet by mouth daily   Yes Historical Provider, MD   meclizine (ANTIVERT) 12.5 MG tablet Take 12.5 mg by mouth 4 acids 1000 MG capsule Take 1 g by mouth 3 times daily.  12/10/10   Historical Provider, MD        CURRENT Medications:  0.9 % sodium chloride infusion, Continuous  sodium chloride flush 0.9 % injection 5-40 mL, 2 times per day  sodium chloride flush 0.9 % injection 5-40 mL, PRN  0.9 % sodium chloride infusion, PRN  acetaminophen (TYLENOL) tablet 650 mg, Q4H PRN  ondansetron (ZOFRAN) injection 4 mg, Q6H PRN  perflutren lipid microspheres (DEFINITY) injection 1.65 mg, ONCE PRN  DOPamine (INTROPIN) 400 mg in dextrose 5 % 250 mL infusion, Continuous  0.9 % sodium chloride bolus, Once  rosuvastatin (CRESTOR) tablet 10 mg, Nightly  b complex-C-folic acid (NEPHROCAPS) capsule 1 mg, Daily  aspirin chewable tablet 81 mg, Daily  vitamin D (ERGOCALCIFEROL) capsule 50,000 Units, Q14 Days  cinacalcet (SENSIPAR) tablet 30 mg, Every Other Day  docusate sodium (COLACE) capsule 100 mg, Daily  levothyroxine (SYNTHROID) tablet 25 mcg, Daily  pantoprazole (PROTONIX) tablet 40 mg, Daily  sevelamer (RENVELA) tablet 800 mg, TID WC  promethazine (PHENERGAN) tablet 12.5 mg, Q6H PRN   Or  ondansetron (ZOFRAN) injection 4 mg, Q6H PRN  polyethylene glycol (GLYCOLAX) packet 17 g, Daily PRN  acetaminophen (TYLENOL) tablet 650 mg, Q6H PRN   Or  acetaminophen (TYLENOL) suppository 650 mg, Q6H PRN  glucose (GLUTOSE) 40 % oral gel 15 g, PRN  dextrose 50 % IV solution, PRN  glucagon (rDNA) injection 1 mg, PRN  dextrose 5 % solution, PRN  midodrine (PROAMATINE) tablet 10 mg, TID  insulin glargine (LANTUS) injection vial 10 Units, Nightly  insulin lispro (HUMALOG) injection vial 0-6 Units, TID WC  insulin lispro (HUMALOG) injection vial 0-3 Units, Nightly  albumin human 25 % IV solution 12.5 g, PRN  midodrine (PROAMATINE) tablet 10 mg, PRN        Allergies:  Metoprolol, Lidocaine, Lisinopril, Betadine [povidone iodine], and Levaquin [levofloxacin in d5w]           Review of Systems: SEE HPI   · Not available      Objective:     PHYSICAL EXAM: Vitals:    06/02/21 1700   BP: (!) 111/44   Pulse: 87   Resp: 18   Temp:    SpO2: 96%    Weight: 222 lb 0.1 oz (100.7 kg)       General Appearance:  confused   Head:  Normocephalic, without obvious abnormality, atraumatic. Eyes:  Pupils equal and round. No scleral icterus. Mouth: Moist mucosa, no pharyngeal erythema. Nose: Nares normal. No drainage or sinus tenderness. Neck: Supple, symmetrical, trachea midline. No adenopathy. No tenderness/mass/nodules. No carotid bruit or elevated JVD. Lungs:   Respiratory Effort: Normal   Auscultation: Clear to auscultation bilaterally, respirations unlabored. No wheeze, rales   Chest Wall:  No tenderness or deformity. Cardiovascular:    Pulses  Palpation: normal   Ascultation: Regular rate, S1/ S2 normal. No murmur, rub, or gallop. 2+ radial and pedal pulses, symmetric  Carotid  Femoral   Abdomen and Gastrointestinal:   Soft, non-tender, bowel sounds active. Liver and Spleen  Masses   Musculoskeletal: No muscle wasting  Back  Gait   Extremities: Extremities normal, atraumatic. No cyanosis or edema. No cyanosis clubbing       Skin: Inspection and palpation performed, no rashes or lesions.        Neurologic: Moves all extremities       Labs     All labs have been reviewed    Lab Results   Component Value Date    WBC 10.6 06/02/2021    RBC 3.68 06/02/2021    HGB 12.7 06/02/2021    HCT 38.1 06/02/2021    .6 06/02/2021    RDW 15.7 06/02/2021     06/02/2021     Lab Results   Component Value Date     06/02/2021    K 5.5 06/02/2021    K 4.3 05/31/2021    CL 93 06/02/2021    CO2 17 06/02/2021    BUN 30 06/02/2021    CREATININE 5.4 06/02/2021    GFRAA 9 06/02/2021    GFRAA 23 05/15/2013    AGRATIO 1.4 05/30/2021    LABGLOM 8 06/02/2021    GLUCOSE 138 06/02/2021    PROT 6.4 06/01/2021    PROT 6.8 11/06/2012    CALCIUM 9.0 06/02/2021    BILITOT 0.7 06/01/2021    ALKPHOS 130 06/01/2021    AST 15 06/01/2021    ALT 19 06/01/2021     No results found for: PTINR  Lab Results   Component Value Date    LABA1C 8.2 01/14/2021     Lab Results   Component Value Date    TROPONINI 1.06 ([facility]) 05/30/2021       Cardiac, Vascular and Imaging Data all Personally Reviewed in Detail by Myself      EKG: Normal sinus rhythm with lateral ST inversion suggestive of ischemia    Echocardiogram:  Summary   -Normal left ventricle size and systolic function with an estimated ejection   fraction of 55-60%. -No regional wall motion abnormalities are seen. -There is moderate concentric left ventricular hypertrophy. -Grade II diastolic dysfunction with elevated LV filling   pressures. E/e\"=24.8.   -Mitral annular calcification is present.   -Moderate mitral regurgitation.   -Mild aortic stenosis . -Trivial pulmonic regurgitation present.   -Moderate to severe tricuspid regurgitation with RVSP of 65 mmHg. -The right ventricle is moderately enlarged.   -Right ventricular systolic function is moderately reduced .   -The right atrium is moderately dilated. Chest x-ray shows mild pulmonary edema    Assessment and Plan     -Non-ST elevation myocardial infarction. Possible reason for ongoing symptoms increasing shortness of breath NSTEMI. Patient has prior history of coronary disease, coronary bypass surgery. Left Subclavian artery severe stenosis. LCX 80% stenosis. SVG to OM 3 patent     CHB TVP consulted EP    -Severe CMP with ischemic heart failure     -Systolic acute congestive heart failure secondary to coronary disease and valvular pathology and reduced ejection fraction.    -Baseline hypotension on midodrine for blood pressure support.    -End-stage renal disease on dialysis. Prognosis poor   Palliative care consult     Thank you for allowing us to participate in the care of Mara Penaloza. Please do not hesitate to contact me if you have any questions.     Daria Aranda MD, MPH    [facility], [address] [place] [address]  Ph: [phone]

## 2021-06-02 NOTE — FLOWSHEET NOTE
06/02/21 1040 06/02/21 1239   Vital Signs   BP (!) 115/50 (!) 102/46   Temp 97.9 °F (36.6 °C) 97.7 °F (36.5 °C)   Pulse 80 80   Resp 24 15   SpO2 91 % 100 %   Weight 227 lb 1.2 oz (103 kg) 222 lb 0.1 oz (100.7 kg)   Weight Method Bed scale Bed scale   Post-Hemodialysis Assessment   NET Removed (ml)  --  2000 ml   Treatment time: 2 hours  Net UF: 2000 ml    Pre weight: 103 kg   Post weight: 100.7 kg  EDW: 98.5 kg    Access used: LLA  AVF   Access function: good with  ml/min    Medications or blood products given: albumin and midodrine    Regular outpatient schedule: JUDITH Ferguson of response to treatment: good    Copy of dialysis treatment record placed in chart, to be scanned into EMR.

## 2021-06-02 NOTE — PROGRESS NOTES
time. No distress. · Head: Normocephalic and atraumatic. · Mouth/Throat: Lips appear moist. Oropharynx is clear and moist.  · Eyes: Conjunctivae normal. EOM are normal.   · Neck: Neck supple. No lymphadenopathy. No rigidity. ? JVD present due to obese neck. · Cardiovascular: Normal rate, regular rhythm. Normal S1&S2. Carotid pulse 2+ bilaterally. · Pulmonary/Chest: Bilateral respiratory sounds present. No respiratory accessory muscle use. No wheezes, No rhonchi. Fine bibasilar rales. · Abdominal: Soft. Normal bowel sounds present. No distension, No tenderness. No splenomegaly. No hernia. · Musculoskeletal: No tenderness. Full range of motion in bilateral upper and lower extremities. Trace pitting BLE edema    · Lymphadenopathy: Has no cervical adenopathy. · Neurological: Alert and oriented. Cranial nerve II-XII grossly intact, No gross deficit to touch. · Skin: Skin is warm and dry. No rash, lesions, ulcerations noted. · Psychiatric: No anxiety nor agitation. Labs, diagnostic and imaging results reviewed. Reviewed. Recent Labs     06/01/21  0557 06/01/21  1312 06/02/21  0826   * 129* 129*   K 4.6 5.0 5.5*   CL 92* 94* 93*   CO2 26 20* 17*   PHOS 3.8  --   --    BUN 17 21* 30*   CREATININE 4.4* 4.6* 5.4*     Recent Labs     06/01/21 0557 06/01/21  1312 06/02/21  0826   WBC 8.7 8.3 10.6   HGB 12.4 11.3* 12.7   HCT 36.7 34.0* 38.1   .5* 101.8* 103.6*   * 123* 122*     Lab Results   Component Value Date    TROPONINI 1.06 05/30/2021     Estimated Creatinine Clearance: 9 mL/min (A) (based on SCr of 5.4 mg/dL [facility])).    Lab Results   Component Value Date     03/10/2014     Lab Results   Component Value Date    PROTIME 17.1 05/31/2021    PROTIME 16.0 05/30/2021    PROTIME 12.9 05/28/2021    INR 1.47 05/31/2021    INR 1.37 05/30/2021    INR 1.11 05/28/2021     Lab Results   Component Value Date    CHOL 134 01/26/2015    HDL 58 01/14/2021    HDL 41 04/25/2012    TRIG 154 01/26/2015       I independently reviewed the ECG, telemetry, and serology results.     Scheduled Meds:   sodium chloride flush  5-40 mL Intravenous 2 times per day    sodium chloride  500 mL Intravenous Once    rosuvastatin  10 mg Oral Nightly    b complex-C-folic acid  1 capsule Oral Daily    aspirin  81 mg Oral Daily    vitamin D  50,000 Units Oral Q14 Days    cinacalcet  30 mg Oral Every Other Day    docusate sodium  100 mg Oral Daily    levothyroxine  25 mcg Oral Daily    pantoprazole  40 mg Oral Daily    sevelamer  800 mg Oral TID WC    midodrine  10 mg Oral TID    insulin glargine  10 Units Subcutaneous Nightly    insulin lispro  0-6 Units Subcutaneous TID WC    insulin lispro  0-3 Units Subcutaneous Nightly     Continuous Infusions:   sodium chloride 50 mL/hr at 06/01/21 1300    sodium chloride      DOPamine 2.5 mcg/kg/min (06/02/21 0856)    dextrose       PRN Meds:sodium chloride flush, sodium chloride, acetaminophen, ondansetron, perflutren lipid microspheres, promethazine **OR** ondansetron, polyethylene glycol, acetaminophen **OR** acetaminophen, glucose, dextrose, glucagon (rDNA), dextrose, albumin human, midodrine     Patient Active Problem List    Diagnosis Date Noted    Ischemic cardiomyopathy 05/27/2021    Abnormal ECG 05/27/2021    Pulmonary hypertension ([address] 05/27/2021    S/P CABG (coronary artery bypass graft) 05/27/2021    Supratherapeutic INR 05/26/2021    Mixed hyperlipidemia 05/09/2011    Coronary atherosclerosis of native coronary artery 05/09/2011    Aortic valve stenosis 05/27/2021    Essential hypertension 05/09/2011    CHB (complete heart block) ([address]     Acute on chronic combined systolic and diastolic congestive heart failure ([address] 05/30/2021    NSTEMI (non-ST elevated myocardial infarction) ([address] 05/25/2021    Weight loss counseling, encounter for     Acute pulmonary edema ([address]     Diabetic nephropathy associated with type 2 diabetes mellitus ([address]  Coronary artery disease due to lipid rich plaque     Obesity, Class III, BMI 40-49.9 (morbid obesity) [facility]     ESRD on hemodialysis ([facility]     Atrial fibrillation (HCC)     Respiratory failure with hypoxia (HCC) 12/11/2018    Volume overload 12/10/2018    Acute respiratory failure with hypoxia (HCC) 11/30/2016    Stenosis of carotid artery 04/21/2016    CKD (chronic kidney disease) stage V requiring chronic dialysis ([facility] 02/05/2015    Kidney failure     Hypoxemia     Acute respiratory failure with hypoxemia (HCC) 01/25/2015    Chronic diastolic heart failure (HCC) 01/25/2015    Hyperkalemia 01/25/2015    Chronic renal failure, stage 4 (severe) ([facility] 01/25/2015    Chronic anemia 01/25/2015    Poorly controlled type 2 diabetes mellitus ([facility] 01/25/2015    GERD (gastroesophageal reflux disease) 01/25/2015    Morbid obesity with BMI of 40.0-44.9, adult ([facility] 01/25/2015    Chronic renal insufficiency, stage IV (severe) ([facility] 12/18/2014    Anemia 11/10/2011    Diabetes education, encounter for 05/09/2011      Active Hospital Problems    Diagnosis Date Noted    Aortic valve stenosis [I35.0] 05/27/2021     Priority: Medium    CHB (complete heart block) ([facility] [I44.2]     Acute on chronic combined systolic and diastolic congestive heart failure (HCC) [I50.43] 05/30/2021    Acute pulmonary edema (HCC) [J81.0]     CKD (chronic kidney disease) stage V requiring chronic dialysis ([facility] [N18.6, Z99.2] 02/05/2015       Assessment and Plan:     Sinus with CHB and ventricular escape rhythm  -caused by mechanical manipulation near the crux while performing coronary angiography, specifically while attempting to cross the aortic valve for LV evaluation  -would anticipate and are witnessing a restoration of 1:1 AV conduction.  -have turned down the TVP setting to 30bpm in order to ensure appropriate and continued 1:1 AV conduction.  Will probably be able to remove by tomorrow.  -no indication for PPM at this time.    EP service will sign off. Thank you for allowing me to participate in the care of this patient. If you have any questions, please do not hesitate to contact me.     Melanie Dickson MD, MS, [facility], [facility]  Cardiac Electrophysiology  [address]  [address] [facility], [address] [place] [address]  [address]  [phone]

## 2021-06-02 NOTE — CARE COORDINATION
[address]  Palliative Care   Progress Note    NAME:  Sarah De Jesus  MEDICAL RECORD NUMBER:  [id number]  AGE: 78 y.o. GENDER: female  : 1940  TODAY'S DATE:  2021    Subjective: Patient is alert, oriented to person, place but not time or situation. She denies pain or shortness of breath. Objective:    Vitals:    21 1300   BP: (!) 106/42   Pulse: 81   Resp: 18   Temp:    SpO2: 100%     Lab Results   Component Value Date    WBC 10.6 2021    HGB 12.7 2021    HCT 38.1 2021    .6 (H) 2021     (L) 2021     Lab Results   Component Value Date    CREATININE 5.4 (HH) 2021    BUN 30 (H) 2021     (L) 2021    K 5.5 (H) 2021    CL 93 (L) 2021    CO2 17 (L) 2021     Lab Results   Component Value Date    ALT 19 2021    AST 15 2021    ALKPHOS 130 (H) 2021    BILITOT 0.7 2021       Plan: I have met with patient and her niece/HPOA Naya Delgado. They give a history of her living with her niece for past couple of years, home has a stair lift, ramp and she has wheeled sitting walker. They have a tere and wheelchair on order because the patient has fallen so much at home. They both agree to DNR status. The patient does not want to go to ECF. We have discussed therapy at ECF/[facility] vs hospice care if she feels she is ready to stop dialysis. At this time the patient still can't seem to make a decision. They would like a PT/OT eval to see what Viridiana Benton can still do for herself. I will continue to follow along with patient to assist with disposition. Code Status: Limited  Discharge Environment:  [] Hospice Consult Agency:  [] Inpatient Hospice    [] Home with [address]   [] ECF with Hospice  [] ECF skilled care with Hospice to follow   [] Other:    Teaching Time:  1hours      I will continue to follow Ms. Miranda's care as needed.       Thank you for allowing me to participate in the care of Ms. Brenda Moreno .      Electronically signed by Brenda Barbour RN, [facility] on 6/2/2021 at 1:59 PM  [address] [place] [address]  Office: [phone]

## 2021-06-02 NOTE — PROGRESS NOTES
Nephrology ([facility]) Progress Note    CC: ESRD management    Subjective:    HPI:  Breathing stable. No CP. Patient seen on dialysis. ROS:  In bed. [address]:  medications reviewed. Objective:  Blood pressure 103/76, pulse 72, temperature 97.9 °F (36.6 °C), resp. rate 17, height 5' 2\" (1.575 m), weight 227 lb 1.2 oz (103 kg), SpO2 97 %, not currently breastfeeding. Intake/Output Summary (Last 24 hours) at 6/2/2021 1245  Last data filed at 6/2/2021 0930  Gross per 24 hour   Intake 2087 ml   Output --   Net 2087 ml     General:  NAD, A+OX3  Chest:  CTAB  CVS:  RRR  Abdominal:  NTND, soft, +BS  Extremities:  no edema  Skin:  no rash    Labs:  Renal panel:  Lab Results   Component Value Date/Time     (L) 06/02/2021 08:26 AM    K 5.5 (H) 06/02/2021 08:26 AM    K 4.3 05/31/2021 05:21 AM    CO2 17 (L) 06/02/2021 08:26 AM    BUN 30 (H) 06/02/2021 08:26 AM    CREATININE 5.4 (HH) 06/02/2021 08:26 AM    CALCIUM 9.0 06/02/2021 08:26 AM    PHOS 3.8 06/01/2021 05:57 AM    MG 1.80 06/01/2021 01:12 PM     CBC:  Lab Results   Component Value Date/Time    WBC 10.6 06/02/2021 08:26 AM    HGB 12.7 06/02/2021 08:26 AM    HCT 38.1 06/02/2021 08:26 AM     (L) 06/02/2021 08:26 AM       Assessment/Plan:  Reviewed old records and labs.     1) ESRD  - HD MWF  - next HD will be full treatment on Friday    2) hypotension  - on proamatine    3) NSTEMI  - cardiac catheterization shows severe left circumflex disease, occluded RCA  - assess aortic valve    4) complete heart block  - s/p temporary PPM  - CP consulted    5) DNR:[facility]

## 2021-06-02 NOTE — PROGRESS NOTES
Late Entry:  @ 0745 Shift handoff with Paula Yadav, RN  @ 0800 Assessment completed. Pt oriented x4, follows commands. RUE restraint removed. Right femoral site soft, without oozing/hematoma. NSR with some paced beats noted. @ 9584 Phlebotomist at bedside for lab draw  @ 0900 Scheduled medications administered. @ 0930 Dr Pretty Brody at bedside for primary care rounds. @ 0945 Dialysis nurse to bedside to set up for HD treatment. @ [phone] Niece Daan Guerrero arrived to bedside. HD in progress. @ 1200 HD in progress. Pt intermittently napping. @ 1230 Pt asking when she can eat. Dietary aide at bedside to get lunch order so that she can eat after HD is complete. @ 1315 HD completed. Offered to assist with meal tray, pt states changed her mind, not ready to eat right now. Paula Booker palliative care RN at bedside to visit with pt and niece. @ 56 Dr Star Stoll to bedside for EP cardiology rounds. TVP back up rate decreased to 30BMP. @ 1500 Pt sleeping, talking in sleep at times. Pt SR with rate in 80s  @ 1532 Pt yelling out, \"I refuse. \" When RN entered room to inquire, Pt rambling about finances and bank account with Dana Guerrero saying \"that's her money she shouldn't use it for my expenses. \" When RN asked orientation questions, pt oriented to self, place, and month. Did not answer situation orientation question. @ 1700 Pt sleeps intermittently. Talks, having conversations with no one in the room. When RN enters, she is often speaking about finances or wanting to go to [facility] near where she lives. @ [phone] Pt yelling out saying \"Juanita stole my money, go out there and get her, I want her to put it back, that money is for Dana Guerrero to use for my .\" RN explained, Miriam Horner hasn't visited her today. RN asked pt orientation questions, pt oriented to self and place. Disoriented to situation and time. @ [address] handoff with Paula Yadav RN.

## 2021-06-02 NOTE — PROGRESS NOTES
Occupational Therapy  Pt transferred to CVU. Will need new orders to resume OT services when medically appropriate.      Elner Level, OTR/L

## 2021-06-03 NOTE — PLAN OF CARE
Problem: Falls - Risk of:  Goal: Will remain free from falls  Description: Will remain free from falls  Outcome: Ongoing  Note: A: Sound com dome light, fall risk arm band, bed/chair alarm, bed in lowest position, wheels of bed/chair locked, non skid footwear, call light in reach, fall precaution education, intentional rounding. Goal: Absence of physical injury  Description: Absence of physical injury  Outcome: Ongoing     Problem: Pain:  Goal: Pain level will decrease  Description: Pain level will decrease  Outcome: Ongoing  Note: A: Assess for pain using appropriate scale. Reposition/non-pharmaceutical interventions and/or medicate as needed for pain. Reassess for effectiveness of intervention. Notify physician of unmanaged pain. Goal: Control of acute pain  Description: Control of acute pain  Outcome: Ongoing  Goal: Control of chronic pain  Description: Control of chronic pain  Outcome: Ongoing     Problem: Skin Integrity:  Goal: Will show no infection signs and symptoms  Description: Will show no infection signs and symptoms  Outcome: Ongoing  Goal: Absence of new skin breakdown  Description: Absence of new skin breakdown  Outcome: Ongoing  Note: A: Low air loss/alternating pressure specialty mattress, assist with turn/repositioning, incontinence care, Preventative Sacral Mepilex border, off loading pressure areas, Nutritional consult, daily weight, I/O, monitor meal intake       Problem: OXYGENATION/RESPIRATORY FUNCTION  Goal: Patient will maintain patent airway  Outcome: Ongoing  Goal: Patient will achieve/maintain normal respiratory rate/effort  Description: Respiratory rate and effort will be within normal limits for the patient  Outcome: Ongoing  Note: A: Assess respiratory rate, effort and pattern. Assess for CRUZ, SOB and accessory muscle use. Notify physician of changes.        Problem: HEMODYNAMIC STATUS  Goal: Patient has stable vital signs and fluid balance  Outcome: Ongoing  Note: A: Continuous cardiac telemetry monitor. VS per routine. Administer treatments and medications as ordered. Monitor patient's weight. Repeat 12 EKGs as ordered for rhythm changes or chest pain. Notify physician of hemodynamic or rhythm changes. Problem: FLUID AND ELECTROLYTE IMBALANCE  Goal: Fluid and electrolyte balance are achieved/maintained  Outcome: Ongoing  Note: A: hemodialysis per nephrology orders. Review lab results. Notify physician of abnormal results. Strict I/O, daily weights. Problem: Fluid Volume:  Goal: Will show no signs or symptoms of fluid imbalance  Description: Will show no signs or symptoms of fluid imbalance  Outcome: Ongoing  Note: A: HD per nephrology orders     Problem: Cardiac Output - Decreased:  Goal: Hemodynamic stability will improve  Description: Hemodynamic stability will improve  Outcome: Ongoing  Note: A: Continuous cardiac telemetry monitor. VS per routine. Administer treatments and medications as ordered. Monitor patient's weight. Repeat 12 EKGs as ordered for rhythm changes or chest pain. Notify physician of hemodynamic or rhythm changes.

## 2021-06-03 NOTE — ACP (ADVANCE CARE PLANNING)
Advance Care Planning     Advance Care Planning Activator (Inpatient)  Conversation Note      Date of ACP Conversation: 6/2/2021     Conversation Conducted with: 6/2/2021 Matt Moreno RN Palliative Care, patient and her niece/HPOA Kenna Taylor    ACP Activator: [facility] Decision Maker:      Current Designated Health Care Decision Maker: Analytejas Brandon (Niece) [phone]    Patient Treatment Preferences   Ventilation: \"If you were in your present state of health and suddenly became very ill and were unable to breathe on your own, what would your preference be about the use of a ventilator (breathing machine) if it were available to you? \"      Would the patient desire the use of ventilator (breathing machine)?: no    \"If your health worsens and it becomes clear that your chance of recovery is unlikely, what would your preference be about the use of a ventilator (breathing machine) if it were available to you? \"     Would the patient desire the use of ventilator (breathing machine)?: No      Resuscitation  \"CPR works best to restart the heart when there is a sudden event, like a heart attack, in someone who is otherwise healthy. Unfortunately, CPR does not typically restart the heart for people who have serious health conditions or who are very sick. \"    \"In the event your heart stopped as a result of an underlying serious health condition, would you want attempts to be made to restart your heart (answer \"yes\" for attempt to resuscitate) or would you prefer a natural death (answer \"no\" for do not attempt to resuscitate)? \" no       [] Yes   [] No   Educated Jeannette / Ivey Ormond regarding differences between Advance Directives and portable DNR orders.     Length of ACP Conversation in minutes:      Conversation Outcomes:  [x] ACP discussion completed  [] Existing advance directive reviewed with patient; no changes to patient's previously recorded wishes  [] New Advance Directive completed  [] Portable Do Not Rescitate prepared for Provider review and signature  [] POLST/POST/MOLST/MOST prepared for Provider review and signature      Follow-up plan:    [] Schedule follow-up conversation to continue planning  [] Referred individual to Provider for additional questions/concerns   [] Advised patient/agent/surrogate to review completed ACP document and update if needed with changes in condition, patient preferences or care setting    [x] This note routed to one or more involved healthcare providers    Electronically signed by MEREDITH Perkins, WARREN, Case Management on 6/3/2021 at 10:54 AM  [facility] [phone]

## 2021-06-03 NOTE — CONSULTS
tablet, Take 30 mg by mouth every other day   RENVELA 800 MG tablet, Take 1 tablet by mouth 3 times daily   Cholecalciferol (VITAMIN D PO), Take 50,000 Units by mouth every 14 days   levothyroxine (SYNTHROID) 25 MCG tablet, Take 25 mcg by mouth Daily. docusate sodium (COLACE) 100 MG capsule, Take 100 mg by mouth daily. Misc. Devices (ROLLATOR) MISC, 1 Device by Does not apply route daily  ACCU-CHEK COMPACT PLUS strip,   LANTUS 100 UNIT/ML injection vial, Inject 15-20 Units into the skin See Admin Instructions Holds for a BS < 100, does a sliding scale with Lantus  COMFORT ASSIST INSULIN SYRINGE 30G X 5/16\" 0.5 ML MISC,   fish oil-omega-3 fatty acids 1000 MG capsule, Take 1 g by mouth 3 times daily. Allergies   Allergen Reactions    Metoprolol Shortness Of Breath and Other (See Comments)     Reported by pt during admission questions \"severe shortness of breath - gasping for air and large drop in blood pressure\"    Lidocaine     Lisinopril      cough    Betadine [Povidone Iodine] Itching and Rash    Levaquin [Levofloxacin In D5w] Rash     Family History   Problem Relation Age of Onset    Heart Disease Father     High Blood Pressure Father     High Cholesterol Father     Heart Attack Father     Diabetes Maternal Uncle     Diabetes Maternal Aunt      Social History     Tobacco Use   Smoking Status Never Smoker   Smokeless Tobacco Never Used     Social History     Substance and Sexual Activity   Drug Use No     Social History     Substance and Sexual Activity   Alcohol Use No     ROS:  Constitutional- No weight loss or fevers  Eyes- No diplopia. No photophobia. Ears/nose/throat- No dysphagia. No Dysarthria  Cardiovascular- No palpitations. No chest pain  Respiratory- No dyspnea. No Cough  Gastrointestinal- No Abdominal pain. No Vomiting. Genitourinary- No incontinence. No urinary retention  Musculoskeletal- No myalgia. No arthralgia  Skin- No rash. No easy bruising. Psychiatric- No depression.  No anxiety  Endocrine- No diabetes. No thyroid issues. Hematologic- No bleeding difficulty. No fatigue  Neurologic- No weakness. No Headache. Exam:  Blood pressure 125/67, pulse 89, temperature 97.6 °F (36.4 °C), temperature source Axillary, resp. rate 20, height 5' 2\" (1.575 m), weight 218 lb 11.1 oz (99.2 kg), SpO2 95 %, not currently breastfeeding. Constitutional    Vital signs: BP, HR, and RR reviewed   General alert, no distress, well-nourished  Eyes: unable to visualize the fundi  Cardiovascular: pulses symmetric in all 4 extremities. Psychiatric: cooperative with examination, no psychotic behavior noted. Neurologic  Mental status:   orientation to person, place, month, year. General fund of knowledge grossly intact. Able to correctly identify the current [place] president. Memory grossly intact   Attention intact as able to attend well to the exam     Language fluent in conversation   Comprehension intact; follows simple commands  Cranial nerves:   CN2: visual fields full   CN 3,4,6: extraocular muscles intact. Pupils are equal, round, reactive bilaterally. CN5: facial sensation symmetric   CN7: face symmetric without dysarthria  CN8: hearing grossly intact  CN9: palate elevated symmetrically  CN11: trap full strength on shoulder shrug  CN12: tongue midline with protrusion  Strength: good strength in all 4 extremities   Deep tendon reflexes: normal in all 4 extremities  Sensory: light touch intact in all 4 extremities. Cerebellar/coordination: finger nose finger normal without ataxia  Tone: normal in all 4 extremities  Gait: deferred at this time for safety. Labs  Glucose 108  Na 129  K 5.5  BUN 30  Cr 5.4    ALT 24  AST 23    BNP > 70K  Troponin 1.06    WBC 10.6K  Hg 12.7  Platelets 150    Studies  CT head w/o 5/25/21, independently reviewed  1. No acute intracranial abnormality. 2. Diffuse atrophic changes with findings suggesting chronic microvascular   ischemia     Impression  1.

## 2021-06-03 NOTE — PROGRESS NOTES
Late Entry:  @ 0730 Shift handoff with Chey Peter RN. @ [phone] Pt awake. Asking why they can't hear her. RN asked who, pt said her family. RN explained no one else in the room with us. Orientation questions asked. Pt oriented to self and states she is in [place] at [facility] and it's June, but when asked if she knows how long she had been in the hospital, she says since 5 this morning. RN oriented pt to her length of stay. Then pt asks RN to get her purse that she just put on the the ground. No purse present. @ 0109 Dr Richard Norris in department. Status discussed, updates provided, no paced rhythm over night. OK to remove TVP this AM.  @ 3101 Call placed to cardiology office to ask Dr Jolene Samson if ok to remove venous and arterial sheaths when TVP removed, and to inquire about Dopamine gtt and NS fluids infusing. Spoke with Vladimir Casas. @ 0712 Dr Jolene Samson returned call. Orders received to wean Dopamine off, d/c IVF, remove venous and arterial sheaths, PT/OT eval after bedrest completed. @ 9054 Order received to remove transvenous pacemaker. Pt instructed on removal procedure. TVP pacing wires removed per policy without difficulty, no ectopy noted on telemetry monitor. @ 0930 Order received to remove femoral arterial and venous sheaths. Patient instructed on removal procedure. Right femoral Arterial and venous sheath site ecchymotic without hematoma or oozing. Arterial sheath removed @ 4840 per policy without difficulty. Integrity of sheath inspected upon removal and no abnormalities noted. Manual pressure held X 20 minutes. Venous sheath removed @ 6927 per policy without difficulty. Integrity of sheath inspected upon removal and no abnormalities noted. Manual pressure held x10min  Dry, sterile tegaderm applied. Pressure dressing applied. Patient tolerated well. Vital signs, groin checks, and pedal pulses will be completed per protocol (every 15 minutes X 4, every 30 minutes X 2, and every hour X 2 per protocol).   @ 3949 Tristen Barrios CNP at bedside for neurology consult. Status reviewed, questions answered, plan of care discussed. @ 1000 Right groin site soft without oozing/hematoma. @ 4662 Diastolic BP 24-37L since Dopamine gtt stopped. MAP 46-66. Pt alert, no changes in mental status. Telemetry NSR with rate 68-70. Call placed to cardiology with update on hemodynamics. Spoke with Jayy Veras. @ [phone] Dr Chey Harley to department. Status reviewed, aware of NIBP. Order received to downgrade to PCU status. @ 1125 Pt's niece Debbie Yepez arrived to bedside. Updates provided. @ 18 Dr Flo Torre to bedside for nephrology rounds. Status reviewed, questions answered. Dr Flo Torre spoke with niece bedside re: continuing HD vs stopping HD as related to how either option contributes to improved quality of life. @ 1213 Right groin site soft without oozing/hematoma. HOB elevated. Set up with lunch tray. Pt feeding self.  @ 27 [facility] RN to bedside. @ 3478 Right groin site remains soft, without oozing/hematoma after sitting up with HOB elevated. [facility] in department reviewing chart for consult and to meet with pt and niece. @ [phone] Call placed to Dr Harry Rehman re: plan for home hospice tomorrow and code status order. @ [phone] Pt sleeping, awakens easily. Pt states she is tired. Oriented to self and place. Closes eyes when not addressed/stimulated. @ 1600 Pt drowsy, awakens easily. Follows commands. Oriented to self and place. No confused conversation as this AM. HR in low 60s, NIBP 88/60(66)  @ 1723 Pt sleeping, awakens easily. Pt states a little hungry, assisted to order supper. Pt c/o generalized discomfort. Pt medicated with PRN dose PO Tylenol. HR 61, NIBP 90/45(54).  @ 1800 Pt sleeping, awakens easily. Fidgets in bed while sleeping, ends up diagonal in bed. When awakened, pt states \"I don't know why I doing that, I don't sleep like that at home. \" Not ready to eat supper yet. HR 58-62.  NIBP 86/34(46).   @ 0450 Pt sleeping, awakens easily. Assisted to set up supper tray, feeding self. @ 1913 Bedside handoff with Suzzanne Heimlich RN.  Pt in bed, watching TV eating popsicle

## 2021-06-03 NOTE — CONSULTS
Pt / family are opting to enroll in hospice care. HF RNs will no longer follow. No specific HF metrics required.

## 2021-06-03 NOTE — CARE COORDINATION
Plan is for patient to return home with niece on completion of dialysis treatment tomorrow afternoon. DME delivery in AM with HOC follow up to facilitate return home with hospice. Karis Walker RN, [address], [address] [place] [address]   O: [phone]  C: [phone]  F: [phone]   Main & Referrals: [phone]   [facility]. [facility]

## 2021-06-03 NOTE — PROGRESS NOTES
Or    ondansetron (ZOFRAN) injection 4 mg  4 mg Intravenous Q6H PRN Haley Castro MD        polyethylene glycol [facility]) packet 17 g  17 g Oral Daily PRN Haley Castro MD        acetaminophen (TYLENOL) tablet 650 mg  650 mg Oral Q6H PRN Haley Castro MD        Or   Ricarda Vinny acetaminophen (TYLENOL) suppository 650 mg  650 mg Rectal Q6H PRN Haley Castro MD        glucose (GLUTOSE) 40 % oral gel 15 g  15 g Oral PRN Yumiko Aguirre MD        dextrose 50 % IV solution  12.5 g Intravenous PRN Yumiko Aguirre MD        glucagon (rDNA) injection 1 mg  1 mg Intramuscular PRN Yumiko Aguirre MD        dextrose 5 % solution  100 mL/hr Intravenous PRN Yumiko Aguirre MD        midodrine (PROAMATINE) tablet 10 mg  10 mg Oral TID Preet Lafleur MD   10 mg at 06/03/21 0900    insulin glargine (LANTUS) injection vial 10 Units  10 Units Subcutaneous Nightly Haley Castro MD   10 Units at 06/02/21 2220    insulin lispro (HUMALOG) injection vial 0-6 Units  0-6 Units Subcutaneous TID WC Haley Castro MD   1 Units at 05/31/21 1742    insulin lispro (HUMALOG) injection vial 0-3 Units  0-3 Units Subcutaneous Nightly Haley Castro MD   1 Units at 06/01/21 2225    albumin human 25 % IV solution 12.5 g  12.5 g Intravenous PRN Preet Lafleur MD 1,000 mL/hr at 06/02/21 1225 12.5 g at 06/02/21 1225    midodrine (PROAMATINE) tablet 10 mg  10 mg Oral PRN Preet Lafleur MD         Allergies   Allergen Reactions    Metoprolol Shortness Of Breath and Other (See Comments)     Reported by pt during admission questions \"severe shortness of breath - gasping for air and large drop in blood pressure\"    Lidocaine     Lisinopril      cough    Betadine [Povidone Iodine] Itching and Rash    Levaquin [Levofloxacin In D5w] Rash     Principal Problem:    Acute on chronic combined systolic and diastolic congestive heart failure ([address]  Active Problems:     Aortic valve stenosis    CKD (chronic kidney disease) stage V requiring chronic dialysis ([facility]    Acute pulmonary edema (HCC)    CHB (complete heart block) ([facility] [address]  Resolved Problems:    * No resolved hospital problems. *    Blood pressure (!) 93/42, pulse 72, temperature 97.6 °F (36.4 °C), temperature source Axillary, resp. rate 17, height 5' 2\" (1.575 m), weight 218 lb 11.1 oz (99.2 kg), SpO2 98 %, not currently breastfeeding. Subjective Feels better bthan yesterday. No CP or SOB. Back in NSR. Objective Alert, conversant, coop. CTA, RR&R with M. No edema. BPs low, as usual after dialysis. DM control adequate. Assessment & Plan PT reeval. Will need ARU or SNF at D/C  D/T weakness.  D/W gianna and Dileep Pedersen MD  6/3/2021

## 2021-06-03 NOTE — PROGRESS NOTES
Occupational Therapy    OT referral received and chart reviewed. Per Nursing, anticipate d/c home with Hospice tomorrow. Will sign off at this time.      Electronically signed by Jessica Roberts OT on 6/3/2021 at 1:47 PM

## 2021-06-03 NOTE — PROGRESS NOTES
[address]  Cardiology Consult    Brett Dash  1940    Chanelle 3, 2021        CC: Shortness of breath      Subjective:     History of Present Illness:    Confused   Awake but confused. Brett Dash is a 78 y.o. patient with a PMH significant for coronary disease, coronary bypass surgery, peripheral arterial disease diabetes, end-stage renal disease presented with complaints of shortness of breath. Mild chest discomfort but increasing shortness of breath with exertion. Lower extremity edema. No nausea vomiting diaphoresis. Past Medical History:   has a past medical history of Arthritis, Blood circulation, collateral, CAD (coronary artery disease), CHF (congestive heart failure) ([facility] Chronic kidney disease, Diabetes mellitus ([facility] Hemodialysis patient ([facility] Hypercholesteremia, Hypertension, Other disorders of kidney and ureter, Pneumonia, and Thyroid disease. Surgical History:   has a past surgical history that includes Coronary artery bypass graft; Cardiac surgery; cyst removal; Refractive surgery; Tonsillectomy; other surgical history (Left, 12/18/14); Dialysis fistula creation (12/18/14); Abdomen surgery; eye surgery; and vascular surgery. Social History:   reports that she has never smoked. She has never used smokeless tobacco. She reports that she does not drink alcohol and does not use drugs. Family History:  family history includes Diabetes in her maternal aunt and maternal uncle; Heart Attack in her father; Heart Disease in her father; High Blood Pressure in her father; High Cholesterol in her father. Home Medications:  Were reviewed and are listed in nursing record and/or below  Prior to Admission medications    Medication Sig Start Date End Date Taking?  Authorizing Provider   Multiple Vitamins-Minerals (THERAPEUTIC MULTIVITAMIN-MINERALS) tablet Take 1 tablet by mouth daily   Yes Historical Provider, MD   meclizine (ANTIVERT) 12.5 MG tablet Take 12.5 mg by mouth 4 times daily as needed for Dizziness (Vertigo)   Yes Historical Provider, MD   ranolazine (RANEXA) 500 MG extended release tablet Take 1 tablet by mouth 2 times daily 5/28/21  Yes TOBY Villagomez CNP   aspirin 81 MG chewable tablet Take 1 tablet by mouth daily 5/29/21  Yes TOBY Villagomez CNP   rosuvastatin (CRESTOR) 40 MG tablet Take 1 tablet by mouth daily 5/29/21  Yes TOBY Jimenez CNP   warfarin (COUMADIN) 2 MG tablet TAKE ONE TABLET BY MOUTH DAILY OR AS DIRECTED BASED ON INR RESULTS 4/9/21  Yes TOBY Ley CNP   pantoprazole (PROTONIX) 40 MG tablet Take 40 mg by mouth daily   Yes Historical Provider, MD   midodrine (PROAMATINE) 10 MG tablet Take 10-20 mg by mouth 3 times daily Only take if instructed by dialysis    Yes Historical Provider, MD   insulin lispro (HUMALOG) 100 UNIT/ML injection vial Inject 15 Units into the skin every morning (before breakfast) sliding scale   Yes Historical Provider, MD   cinacalcet (SENSIPAR) 30 MG tablet Take 30 mg by mouth every other day    Yes Historical Provider, MD   RENVELA 800 MG tablet Take 1 tablet by mouth 3 times daily  7/27/15  Yes Historical Provider, MD   Cholecalciferol (VITAMIN D PO) Take 50,000 Units by mouth every 14 days    Yes Historical Provider, MD   levothyroxine (SYNTHROID) 25 MCG tablet Take 25 mcg by mouth Daily. Yes Historical Provider, MD   docusate sodium (COLACE) 100 MG capsule Take 100 mg by mouth daily. 12/10/10  Yes Historical Provider, MD   Misc.  Devices (ROLLATOR) MISC 1 Device by Does not apply route daily 12/27/18   Ayala Roman MD   ACCU-CHEK COMPACT PLUS strip  7/28/15   Historical Provider, MD   LANTUS 100 UNIT/ML injection vial Inject 15-20 Units into the skin See Admin Instructions Holds for a BS < 100, does a sliding scale with Lantus 8/16/15   Historical Provider, MD   COMFORT ASSIST INSULIN SYRINGE 30G X 5/16\" 0.5 ML MISC  9/2/15   Historical Provider, MD   fish oil-omega-3 fatty acids 1000 MG capsule Take 1 g by mouth 3 times daily.  12/10/10   Historical Provider, MD        CURRENT Medications:  atropine 1 MG/10ML injection,   sodium chloride flush 0.9 % injection 5-40 mL, 2 times per day  sodium chloride flush 0.9 % injection 5-40 mL, PRN  0.9 % sodium chloride infusion, PRN  acetaminophen (TYLENOL) tablet 650 mg, Q4H PRN  ondansetron (ZOFRAN) injection 4 mg, Q6H PRN  perflutren lipid microspheres (DEFINITY) injection 1.65 mg, ONCE PRN  0.9 % sodium chloride bolus, Once  rosuvastatin (CRESTOR) tablet 10 mg, Nightly  b complex-C-folic acid (NEPHROCAPS) capsule 1 mg, Daily  aspirin chewable tablet 81 mg, Daily  vitamin D (ERGOCALCIFEROL) capsule 50,000 Units, Q14 Days  cinacalcet (SENSIPAR) tablet 30 mg, Every Other Day  docusate sodium (COLACE) capsule 100 mg, Daily  levothyroxine (SYNTHROID) tablet 25 mcg, Daily  pantoprazole (PROTONIX) tablet 40 mg, Daily  sevelamer (RENVELA) tablet 800 mg, TID WC  promethazine (PHENERGAN) tablet 12.5 mg, Q6H PRN   Or  ondansetron (ZOFRAN) injection 4 mg, Q6H PRN  polyethylene glycol (GLYCOLAX) packet 17 g, Daily PRN  acetaminophen (TYLENOL) tablet 650 mg, Q6H PRN   Or  acetaminophen (TYLENOL) suppository 650 mg, Q6H PRN  glucose (GLUTOSE) 40 % oral gel 15 g, PRN  dextrose 50 % IV solution, PRN  glucagon (rDNA) injection 1 mg, PRN  dextrose 5 % solution, PRN  midodrine (PROAMATINE) tablet 10 mg, TID  insulin glargine (LANTUS) injection vial 10 Units, Nightly  insulin lispro (HUMALOG) injection vial 0-6 Units, TID WC  insulin lispro (HUMALOG) injection vial 0-3 Units, Nightly  albumin human 25 % IV solution 12.5 g, PRN  midodrine (PROAMATINE) tablet 10 mg, PRN        Allergies:  Metoprolol, Lidocaine, Lisinopril, Betadine [povidone iodine], and Levaquin [levofloxacin in d5w]           Review of Systems: SEE HPI   · Not available      Objective:     PHYSICAL EXAM:      Vitals:    06/03/21 0800   BP: 125/67   Pulse: 89   Resp: 20   Temp:    SpO2: 95% Value Date    TROPONINI 1.06 ([facility]) 05/30/2021       Cardiac, Vascular and Imaging Data all Personally Reviewed in Detail by Myself      EKG: Normal sinus rhythm with lateral ST inversion suggestive of ischemia    Echocardiogram:  Summary   -Normal left ventricle size and systolic function with an estimated ejection   fraction of 55-60%. -No regional wall motion abnormalities are seen. -There is moderate concentric left ventricular hypertrophy. -Grade II diastolic dysfunction with elevated LV filling   pressures. E/e\"=24.8.   -Mitral annular calcification is present.   -Moderate mitral regurgitation.   -Mild aortic stenosis . -Trivial pulmonic regurgitation present.   -Moderate to severe tricuspid regurgitation with RVSP of 65 mmHg. -The right ventricle is moderately enlarged.   -Right ventricular systolic function is moderately reduced .   -The right atrium is moderately dilated. Chest x-ray shows mild pulmonary edema    Assessment and Plan     -Non-ST elevation myocardial infarction. Possible reason for ongoing symptoms increasing shortness of breath NSTEMI. Patient has prior history of coronary disease, coronary bypass surgery. Left Subclavian artery severe stenosis. LCX 80% stenosis. SVG to OM 3 patent     CHB TVP consulted EP    -Severe CMP with ischemic heart failure     -Systolic acute congestive heart failure secondary to coronary disease and valvular pathology and reduced ejection fraction.    -Baseline hypotension on midodrine for blood pressure support.    -End-stage renal disease on dialysis. Prognosis poor   Palliative care consult     Thank you for allowing us to participate in the care of Page Patel. Please do not hesitate to contact me if you have any questions.     Richy Barrera MD, MPH    [facility], [address]  Ph: [phone]  Fax: [phone]    6/3/2021 8:59 AM

## 2021-06-03 NOTE — PROGRESS NOTES
Nutrition Note    Attempted to see pt but pt sleeping. Visitor in room. Left education handouts at bedside with RD name and # in case question arise. Will follow-up.     Electronically signed by Alfredo Rosas RD, LD on 6/3/21 at 11:26 AM EDT    Contact: [phone]

## 2021-06-03 NOTE — CARE COORDINATION
[facility]  Palliative Care   Progress Note    NAME:  Keo Romo  MEDICAL RECORD NUMBER:  [id number]  AGE: 78 y.o. GENDER: female  : 1940  TODAY'S DATE:  6/3/2021    Subjective: Patient more awake alert but still confused about why she is here. Objective:    Vitals:    21 1200   BP: (!) 92/59   Pulse: 69   Resp: 21   Temp: 97.6 °F (36.4 °C)   SpO2: 95%     Lab Results   Component Value Date    WBC 10.6 2021    HGB 12.7 2021    HCT 38.1 2021    .6 (H) 2021     (L) 2021     Lab Results   Component Value Date    CREATININE 5.4 (HH) 2021    BUN 30 (H) 2021     (L) 2021    K 5.5 (H) 2021    CL 93 (L) 2021    CO2 17 (L) 2021     Lab Results   Component Value Date    ALT 19 2021    AST 15 2021    ALKPHOS 130 (H) 2021    BILITOT 0.7 2021       Plan: Patient and niece have spoken with Dr Swetha Morales and understand dialysis is not improving her quality of life at this time. They are in agreement with stopping dialysis and getting hospice upon discharge home. Plan is to go home with hospice tomorrow after last dialysis. Code Status: DNR CC  Discharge Environment:  [x] Hospice Consult Agency: List provided they have chosen [facility]     [x] Home with Hospice Care     Teaching Time:  0hours  30 min     I will continue to follow Ms. Miranda's care as needed. Thank you for allowing me to participate in the care of Ms. Miranda .      Electronically signed by Carine Buchanan RN, [address] [place], [place] [address] on 6/3/2021 at 12:20 PM  [address] [place] [address]  Office: [phone]

## 2021-06-04 NOTE — DISCHARGE SUMMARY
[address]           [address] [facility]                               DISCHARGE SUMMARY    PATIENT NAME: Trav Garcia                       :        1940  MED REC NO:   [id number]                          ROOM:       [id number]  ACCOUNT NO:   [de-identified]                           ADMIT DATE: 2021  PROVIDER:     Jay Daugherty MD                  DISCHARGE DATE:      DATE OF DISCHARGE:  2021    FINAL DIAGNOSES:  1.  End-stage congestive heart failure. 2.  End-stage renal disease. 3.  Aortic stenosis. 4.  Type 2 diabetes mellitus. 5.  Debility, unspecified. 6.  Complete heart block. 7.  Coronary artery disease. 8.  Pulmonary edema. 9. Metabolic encephalopathy  HISTORY OF PRESENT ILLNESS:  The patient is a chronically ill  47-year-old white, single female who lived in her own home and was  looked after by her niece and nephew. One week prior to this admission,  the patient was in [facility] with a non-STEMI. She was  discharged from [facility] in stable condition. Unfortunately on  the day of admission to [facility], she developed shortness of breath  and orthopnea prompting the emergency room admission at [facility].   Emergency Room evaluation revealed pulmonary edema. Nephrology was  called and arranged for urgent dialysis and admission was felt  necessary. I was contacted, agreed to admit the patient to Med Surg. The patient did have a past history of end-stage kidney disease, was on  dialysis, coronary artery disease with previous coronary artery bypass  surgery, congestive heart failure, type 2 diabetes mellitus, etc.    APPROPRIATE STUDIES:  Chest x-ray showed cardiomegaly and pulmonary  edema. Lab results showed a slightly low sodium of 129, creatinine was  6.1, stable value in this patient, BUN was 27. Albumin was 3.9. Hemoglobin was 11.9, hematocrit 35.4.   Electrocardiogram showed no acute  changes. ProBNP was elevated to greater than 70,000. Troponin was  slightly elevated to 1.06, probably on the basis of chronic kidney  disease. HOSPITAL COURSE:  The patient's hospital course was somewhat complex. Following urgent dialysis, her shortness of breast improved. She had no  chest pain or palpitations. She was seen in Cardiology consultation and  the patient consented to coronary angiography and evaluation of her  aortic stenosis for TAVR. On 06/01, the patient underwent coronary  angiography that showed patent grafts. At the attempted transversing of  the stenotic aortic valve, the patient went into complete heart block  and required placement of a temporary pacemaker as well as admission to  Cardiac Intensive Care  The patient also was lethargic and confused. Then, the patient improved some the next day. Her mentation cleared  some and she regained sinus rhythm such that the temporary pacemaker was  able to be discontinued. In view of the patient's overall poor health,  multiple comorbidities, end-stage renal disease, end-stage cardiac  disease, Palliative Care requested Hospice consult and the patient and  her a niece met Hospice and they elected hospice home care. At the  present time, the patient as currently awaiting eventual discharge from  [facility] [place] to home and then hospice home care. She will discontinue dialysis. Discharge meds will be per the med rec. At the time of discharge, the  patient is known to be allergic to METOPROLOL, LIDOCAINE, BETADINE,  LEVAQUIN and LISINOPRIL and I will continue to follow her through  hospice.           Kyler Riggs MD    D: 06/03/2021 17:16:32       T: 06/03/2021 17:26:19     [id number]  Job#: [id number]     Doc#: [id number]    CC:

## 2021-06-04 NOTE — PROGRESS NOTES
Kings Velasco is a 78 y.o. female patient.     Current Facility-Administered Medications   Medication Dose Route Frequency Provider Last Rate Last Admin    sodium chloride flush 0.9 % injection 5-40 mL  5-40 mL Intravenous 2 times per day Meghan Ann MD   10 mL at 06/03/21 2224    sodium chloride flush 0.9 % injection 5-40 mL  5-40 mL Intravenous PRN Meghan Ann MD        0.9 % sodium chloride infusion  25 mL Intravenous PRN Meghan Ann MD        acetaminophen (TYLENOL) tablet 650 mg  650 mg Oral Q4H PRN Meghan Ann MD        ondansetron [facility]) injection 4 mg  4 mg Intravenous Q6H PRN Meghan Ann MD        perflutren lipid microspheres (DEFINITY) injection 1.65 mg  1.5 mL Intravenous ONCE PRN Meghan Ann MD        0.9 % sodium chloride bolus  500 mL Intravenous Once Meghan Ann MD        rosuvastatin (CRESTOR) tablet 10 mg  10 mg Oral Nightly Sheridan Thompson MD   10 mg at 06/03/21 2228    b complex-C-folic acid (NEPHROCAPS) capsule 1 mg  1 capsule Oral Daily Adarsh Ambrocio MD   1 mg at 06/03/21 1000    aspirin chewable tablet 81 mg  81 mg Oral Daily Sheridan Thompson MD   81 mg at 06/04/21 0739    vitamin D (ERGOCALCIFEROL) capsule 50,000 Units  50,000 Units Oral Q14 Days Sheridan Thompson MD   50,000 Units at 06/01/21 0850    cinacalcet (SENSIPAR) tablet 30 mg  30 mg Oral Every Other Day Sheridan Thompson MD   30 mg at 06/02/21 0900    docusate sodium (COLACE) capsule 100 mg  100 mg Oral Daily Sheridan Thompson MD   100 mg at 06/03/21 2281    levothyroxine (SYNTHROID) tablet 25 mcg  25 mcg Oral Daily Sheridan Thompson MD   25 mcg at 06/04/21 0739    pantoprazole (PROTONIX) tablet 40 mg  40 mg Oral Daily Sheridan Thompson MD   40 mg at 06/03/21 0950    sevelamer (RENVELA) tablet 800 mg  800 mg Oral TID WC Sheridan Thompson MD   800 mg at 06/03/21 1207    promethazine (PHENERGAN) tablet 12.5 mg  12.5 mg Oral Q6H PRN Sheridan Thompson MD        Or    ondansetron [facility]) injection 4 mg  4 mg Intravenous Q6H PRN Belkys Chou MD        polyethylene glycol [facility]) packet 17 g  17 g Oral Daily PRN Belkys Chou MD        acetaminophen (TYLENOL) tablet 650 mg  650 mg Oral Q6H PRN Belkys Chou MD   650 mg at 06/03/21 1713    Or    acetaminophen (TYLENOL) suppository 650 mg  650 mg Rectal Q6H PRN Belkys Chou MD        glucose (GLUTOSE) 40 % oral gel 15 g  15 g Oral PRN Konstantin Harris MD        dextrose 50 % IV solution  12.5 g Intravenous PRN Konstantin Harris MD        glucagon (rDNA) injection 1 mg  1 mg Intramuscular PRN Konstantin Harris MD        dextrose 5 % solution  100 mL/hr Intravenous PRN Konstantin Harris MD        midodrine (PROAMATINE) tablet 10 mg  10 mg Oral TID Jeremy Rojas MD   10 mg at 06/04/21 0739    insulin glargine (LANTUS) injection vial 10 Units  10 Units Subcutaneous Nightly Belkys Chou MD   10 Units at 06/03/21 2228    insulin lispro (HUMALOG) injection vial 0-6 Units  0-6 Units Subcutaneous TID WC Belkys Chou MD   1 Units at 06/03/21 1722    insulin lispro (HUMALOG) injection vial 0-3 Units  0-3 Units Subcutaneous Nightly Belkys Chou MD   1 Units at 06/01/21 2225    albumin human 25 % IV solution 12.5 g  12.5 g Intravenous PRN Jeremy Rojas MD 1,000 mL/hr at 06/02/21 1225 12.5 g at 06/02/21 1225    midodrine (PROAMATINE) tablet 10 mg  10 mg Oral PRN Jeremy Rojas MD         Allergies   Allergen Reactions    Metoprolol Shortness Of Breath and Other (See Comments)     Reported by pt during admission questions \"severe shortness of breath - gasping for air and large drop in blood pressure\"    Lidocaine     Lisinopril      cough    Betadine [Povidone Iodine] Itching and Rash    Levaquin [Levofloxacin In D5w] Rash     Principal Problem:    Acute on chronic combined systolic and diastolic congestive heart failure ([address]  Active Problems:     Aortic valve stenosis    CKD (chronic kidney disease) stage V requiring chronic dialysis ([facility]    Acute pulmonary edema (HCC)    CHB (complete heart block) ([facility] [facility]  Resolved Problems:    * No resolved hospital problems. *    Blood pressure (!) 94/36, pulse 61, temperature 97.9 °F (36.6 °C), temperature source Oral, resp. rate 18, height 5' 2\" (1.575 m), weight 220 lb 7.4 oz (100 kg), SpO2 94 %, not currently breastfeeding. Subjective  No complaints  Objective A&O, coop. Cta, rr&r, nl. abd , no edema. Remains in NSR  Assessment & Plan Has elected hospice and to D/C dialysis. Danielle Catalan for D/C.     Haley Castro MD  6/4/2021

## 2021-06-04 NOTE — CARE COORDINATION
Call rec'd from bedside RN who states family is waiitng for DME to be delivered but it is not there yet.   Call placed to [facility] with [facility] who reports he is trying to call family to inform them it will be delivered between 15 - 3pm.   is at [address]     Case Management   [phone]    6/4/2021  12:24 PM

## 2021-06-04 NOTE — CARE COORDINATION
Home with [address] by [address] at [address] [place] [address],[address] [place] [address], [facility], [address] [place] [address]   O: [phone]  C: [phone]  F: [phone]   Main & Referrals: [phone]   [facility]. [facility]

## 2021-06-05 NOTE — PROGRESS NOTES
Dino Florez from [facility], pt ready for Discharge, paper work has been ready, just waiting for medical equipment be delivered at home. Ambulance to  at 930pm.  2000 pt awake, snacking on VIRY reynolds. Aware of plans for home Hospice. \"ya am going home to crop, they gave me 2 weeks\"  Now I can go watch my favorite TV shows at home. 2130 Ambulance here, note DNR missing MD signature, there fore not Valid, Dr. Sam Patel on call notified, came and signed form. Pt A & Ox4.     2200 D/C'd per stretcher  Call made to Ivan Casanova, given update and Pt is on the way.

## 2021-06-16 ENCOUNTER — TELEPHONE (OUTPATIENT)
Dept: CARDIOLOGY CLINIC | Age: 81
End: 2021-06-16

## 2022-07-07 NOTE — PROGRESS NOTES
Pt has not had any diet or med changes. Pt is taking an antibiotic later today for her dentist appointment. We are keeping her on the same dose but are having her recheck INR in 3 days to make sure antibiotic does not mess up INR. Abbe Flap (Upper To Lower Lip) Text: The defect of the lower lip was assessed and measured.  Given the location and size of the defect, an Abbe flap was deemed most appropriate.  Using a sterile surgical marker, an appropriate Abbe flap was measured and drawn on the upper lip. Local anesthesia was then infiltrated.  A scalpel was then used to incise the upper lip through and through the skin, vermilion, muscle and mucosa, leaving the flap pedicled on the opposite side.  The flap was then rotated and transferred to the lower lip defect.  The flap was then sutured into place with a three layer technique, closing the orbicularis oris muscle layer with subcutaneous buried sutures, followed by a mucosal layer and an epidermal layer.

## 2025-01-14 NOTE — PROGRESS NOTES
[address]     Outpatient Follow Up Note    Astrid Sheikh is 78 y.o. female who presents today with a history of CAD s/p CABG, TR, HTN, PAF Dec '18 after an episode of volume overload (missing dialysis tx) and hyperlipidemia. Her other history includes carotid stenosis and ESRD followed by Dr. Laura Healy. CHIEF COMPLAINT / HPI:  Follow Up secondary to coronary artery disease    Subjective:     she denies significant chest pain. There is no SOB normally. sometimes she is dyspneic when she does something more exertional or if her BP drops during dialysis. The patient denies orthopnea/PND. She only sleeps 4 hrs and has been this way her entire life. The patient has her normal swelling in her ankles. She urinates some, not a lot, daily. Her dry wt is 99kg. She gains over the weekend ~ 2-3 kg yet better throughout the week. The patient is not experiencing palpitations or dizziness. These symptoms show no change since the last OV. She gets midodrine when her BP goes < 100/ ; she's into 90 minutes of her 4 hr run at which time her BP drops. Her carvedilol was changed to metoprolol for BP effects. With regard to medication therapy the patient has been compliant with prescribed regimen. They have tolerated therapy to date. Past Medical History:   Diagnosis Date    Arthritis     Blood circulation, collateral     CAD (coronary artery disease)     CHF (congestive heart failure) (HCC)     Chronic kidney disease     Diabetes mellitus ([facility] [address]     Hemodialysis patient ([facility]     Mon. Wed. Fri.     Hypercholesteremia     Hypertension     Other disorders of kidney and ureter     Pneumonia     pneumonia    Thyroid disease      Social History:    Social History     Tobacco Use   Smoking Status Never Smoker   Smokeless Tobacco Never Used     Current Medications:  Current Outpatient Medications   Medication Sig Dispense Refill    pantoprazole (PROTONIX) 40 MG tablet Take 40 mg by mouth daily  warfarin (COUMADIN) 2 MG tablet TAKE ONE TABLET BY MOUTH DAILY OR AS DIRECTED BASED ON INR RESULTS 60 tablet 0    metoprolol tartrate (LOPRESSOR) 25 MG tablet Take 25 mg by mouth 2 times daily      midodrine (PROAMATINE) 10 MG tablet Take 10-20 mg by mouth 3 times daily Only take if instructed by dialysis       insulin lispro (HUMALOG) 100 UNIT/ML injection vial Inject 15 Units into the skin every morning (before breakfast) sliding scale      cinacalcet (SENSIPAR) 30 MG tablet Take 30 mg by mouth every other day       LANTUS 100 UNIT/ML injection vial Inject 15 Units into the skin See Admin Instructions Holds for a BS < 100, does a sliding scale with Lantus      RENVELA 800 MG tablet Take 1 tablet by mouth 3 times daily       Cholecalciferol (VITAMIN D PO) Take 50,000 Units by mouth every 14 days       levothyroxine (SYNTHROID) 25 MCG tablet Take 25 mcg by mouth Daily.  rosuvastatin (CRESTOR) 20 MG tablet Take 20 mg by mouth daily.  fish oil-omega-3 fatty acids 1000 MG capsule Take 1 g by mouth 3 times daily.  docusate sodium (COLACE) 100 MG capsule Take 100 mg by mouth daily.  Misc. Devices (ROLLATOR) MISC 1 Device by Does not apply route daily 1 each 0    ACCU-CHEK COMPACT PLUS strip       COMFORT ASSIST INSULIN SYRINGE 30G X 5/16\" 0.5 ML MISC        No current facility-administered medications for this visit. REVIEW OF SYSTEMS:    CONSTITUTIONAL: No major weight gain or loss, fatigue, weakness, night sweats or fever. HEENT: No new vision difficulties or ringing in the ears. RESPIRATORY: No new SOB, PND, orthopnea or cough. CARDIOVASCULAR: See HPI  GI: No nausea, vomiting, diarrhea, - constipation, abdominal pain or changes in bowel habits. : No urinary frequency, urgency, incontinence hematuria or dysuria. SKIN: Lt forearm infx. MUSCULOSKELETAL: No new muscle or joint pain. NEUROLOGICAL: No syncope or TIA-like symptoms. [id number] Score for Atrial Fibrillation Stroke Risk   Risk   Factors  Component Value   C CHF No 0   H HTN Yes 1   A2 Age >= 76 Yes,  (78 y.o.) 2   D DM Yes 1   S2 Prior Stroke/TIA No 0   V Vascular Disease No 1   A Age 74-69 No,  (78 y.o.) 0   Sc Sex female 1    [id number]  Score  6   Score last updated 1/14/21 12:41 PM EST    Echo: 6/16/20:   Summary   -Overall left ventricular function is normal.   -Ejection fraction is visually estimated to be 55-60 %. E/e'= 20.05 cm.   -Grade II diastolic dysfunction with elevated LV filling pressures. -Mitral annular calcification is present.   -The mitral valve leaflets appear myxomatous. -Moderate mitral regurgitation.   -Dilated left atrium with a volume of 79.6 ml.   -There is moderate fibrocalcific sclerosis of the aortic valve with evidence   of mild aortic stenosis with mean gradient across the aortic valve of 13   mmHg and calculated aortic valve area was 1.64 square centimeters, all   suggesting mild aortic stenosis. -Moderate tricuspid regurgitation. Estimated pulmonary artery systolic   pressure is mildly to moderately elevated at 65 mmHg assuming a right atrial   pressure of 3 mmHg. Echo: Dec '18:  Summary   -Normal left ventricle size and systolic function with an estimated ejection   fraction of 55-60%.  -No regional wall motion abnormalities are seen.   -There is moderate concentric left ventricular hypertrophy.   -Grade II diastolic dysfunction with elevated LV filling   pressures. E/e\"=24.8.   -Mitral annular calcification is present.   -Moderate mitral regurgitation.   -Mild aortic stenosis .   -Trivial pulmonic regurgitation present.   -Moderate to severe tricuspid regurgitation with RVSP of 65 mmHg.   -The right ventricle is moderately enlarged.   -Right ventricular systolic function is moderately reduced .   -The right atrium is moderately dilated.     Last Stress Test: Nov '14:  Summary  Small sized inferior completely reversible defect consistent with ischemia    in the territory of the RCA .    Diaphragm attenuation reduces the specificity of this finding.    Normal LV function     Carotid US: Oct '16  The bilateral internal carotid arteries appear to have a 50-79% diameter    reducing stenosis based on velocity criteria.           Assessment:      Diagnosis Orders   1. Atherosclerosis of native coronary artery of native heart without angina pectoris   ~stable : offers no c/o  ~s/p CABG '11  ~small reversible defect on stress from '14  ~metoprolol / crestor    2. Paroxysmal atrial fibrillation (HCC)   ~AP regular  ~denies palpitations     ~occurred during episode of volume overload   ~BB / AC  ~INR therapeutic   ~LA 4.4 cm on echo '18; 5.2 cm from study in June '20  ~CHADsVASc 6    3. Essential hypertension   ~controlled   ~grade II diastolic dysfunction     4. Valvular regurgitation   ~stable by echo June '20 and comparable to previous study done in '18  ~mod MR, mod-severe TR on echo from Dec '18    5. Bilateral carotid artery stenosis     ~did not have US done as planned after last appt   ~michelle ICA 50-79% by US '16  ~denies stroke-like symptoms   ~statin ( AC for PAF)    6. Mixed hyperlipidemia   ~no recent profile; dialysis no longer doing add-on labs  ~crestor        I had the opportunity to review the clinical symptoms and presentation of Debbie Miranda. Plan:     1. EKG: sinus rhythm 60  2. Non-fasting lipid profile as routine; include BNP (swelling and CRUZ)  3. echocardiogram and carotid US : surveillance   4. F/U in six months with Dr. Steph Torres (test the same day as appt with transportation limitations)   ~encouraged to f/u with PCP regarding low PM BS    Overall the patient is stable from CV standpoint    I have addresed the patient's cardiac risk factors and adjusted pharmacologic treatment as needed. In addition, I have reinforced the need for patient directed risk factor modification. Further evaluation will be based upon the patient's clinical course and testing results. All questions and concerns were addressed to the patient. Alternatives to my treatment were discussed. The patient is not currently smoking. The risks related to smoking were reviewed with the patient. Recommend maintaining a smoke-free lifestyle. Patient is on a beta-blocker  Patient is not on an ace-i/ARB : CKD / ESRD   Patient is on a statin     anti-coagulation has been recommended / prescribed for this patient. Education conducted on adverse reactions including bleeding was discussed. The patient verbalizes understanding not to stop medications without discussing with us. Discussed exercise: 30-60 minutes 7 days/week  Discussed Low saturated fat/PEREZ diet. BS run high in the morning and low at night     Thank you for allowing to us to participate in the care of Jimmy Singh.     TOBY Tilley    Documentation of today's visit sent to PCP Oral abscess